# Patient Record
Sex: FEMALE | Race: BLACK OR AFRICAN AMERICAN | NOT HISPANIC OR LATINO | Employment: OTHER | URBAN - METROPOLITAN AREA
[De-identification: names, ages, dates, MRNs, and addresses within clinical notes are randomized per-mention and may not be internally consistent; named-entity substitution may affect disease eponyms.]

---

## 2023-03-12 DIAGNOSIS — I10 HYPERTENSION, UNSPECIFIED TYPE: Primary | ICD-10-CM

## 2023-03-13 RX ORDER — MONTELUKAST SODIUM 10 MG/1
TABLET ORAL
Qty: 90 TABLET | Refills: 3 | Status: SHIPPED | OUTPATIENT
Start: 2023-03-13 | End: 2023-07-31 | Stop reason: SDUPTHER

## 2023-03-13 RX ORDER — AMLODIPINE BESYLATE 10 MG/1
TABLET ORAL
Qty: 90 TABLET | Refills: 3 | Status: SHIPPED | OUTPATIENT
Start: 2023-03-13 | End: 2023-07-31 | Stop reason: SDUPTHER

## 2023-03-13 RX ORDER — PRAVASTATIN SODIUM 40 MG/1
40 TABLET ORAL DAILY
Qty: 90 TABLET | Refills: 0 | Status: SHIPPED | OUTPATIENT
Start: 2023-03-13 | End: 2023-07-31 | Stop reason: SDUPTHER

## 2023-03-13 RX ORDER — AMLODIPINE BESYLATE 10 MG/1
10 TABLET ORAL DAILY
COMMUNITY
Start: 2013-02-04 | End: 2023-03-13 | Stop reason: SDUPTHER

## 2023-03-13 RX ORDER — MONTELUKAST SODIUM 10 MG/1
10 TABLET ORAL DAILY
Qty: 90 TABLET | Refills: 0 | Status: SHIPPED | OUTPATIENT
Start: 2023-03-13 | End: 2023-03-28 | Stop reason: SDUPTHER

## 2023-03-13 RX ORDER — AMLODIPINE BESYLATE 10 MG/1
10 TABLET ORAL DAILY
Qty: 90 TABLET | Refills: 0 | Status: SHIPPED | OUTPATIENT
Start: 2023-03-13 | End: 2023-03-28 | Stop reason: SDUPTHER

## 2023-03-13 RX ORDER — PRAVASTATIN SODIUM 40 MG/1
TABLET ORAL
Qty: 90 TABLET | Refills: 3 | Status: SHIPPED | OUTPATIENT
Start: 2023-03-13 | End: 2023-03-28 | Stop reason: SDUPTHER

## 2023-03-13 RX ORDER — MONTELUKAST SODIUM 10 MG/1
1 TABLET ORAL DAILY
COMMUNITY
Start: 2013-05-13 | End: 2023-03-13 | Stop reason: SDUPTHER

## 2023-03-13 RX ORDER — PRAVASTATIN SODIUM 40 MG/1
1 TABLET ORAL DAILY
COMMUNITY
Start: 2013-02-04 | End: 2023-03-13 | Stop reason: SDUPTHER

## 2023-03-20 PROBLEM — R44.1 VISUAL HALLUCINATION: Status: ACTIVE | Noted: 2023-03-20

## 2023-03-20 PROBLEM — M48.00 SPINAL STENOSIS: Status: ACTIVE | Noted: 2023-03-20

## 2023-03-20 PROBLEM — J45.909 ASTHMA (HHS-HCC): Status: ACTIVE | Noted: 2023-03-20

## 2023-03-20 PROBLEM — M19.072 LOCALIZED OSTEOARTHROSIS OF LEFT ANKLE AND FOOT: Status: ACTIVE | Noted: 2023-03-20

## 2023-03-20 PROBLEM — E87.6 HYPOKALEMIA: Status: ACTIVE | Noted: 2023-03-20

## 2023-03-20 PROBLEM — R05.9 COUGH: Status: ACTIVE | Noted: 2023-03-20

## 2023-03-20 PROBLEM — M48.062 LUMBAR STENOSIS WITH NEUROGENIC CLAUDICATION: Status: ACTIVE | Noted: 2023-03-20

## 2023-03-20 PROBLEM — M79.671 PAIN IN BOTH FEET: Status: ACTIVE | Noted: 2023-03-20

## 2023-03-20 PROBLEM — M53.3 SACROILIAC PAIN: Status: ACTIVE | Noted: 2023-03-20

## 2023-03-20 PROBLEM — M54.16 LUMBAR RADICULAR PAIN: Status: ACTIVE | Noted: 2023-03-20

## 2023-03-20 PROBLEM — I10 ESSENTIAL HYPERTENSION: Status: ACTIVE | Noted: 2023-03-20

## 2023-03-20 PROBLEM — M79.672 PAIN IN BOTH FEET: Status: ACTIVE | Noted: 2023-03-20

## 2023-03-20 PROBLEM — M85.80 OSTEOPENIA: Status: ACTIVE | Noted: 2023-03-20

## 2023-03-20 PROBLEM — H35.30 MACULAR DEGENERATION: Status: ACTIVE | Noted: 2023-03-20

## 2023-03-20 PROBLEM — G47.9 SLEEP DISTURBANCE: Status: ACTIVE | Noted: 2023-03-20

## 2023-03-20 PROBLEM — J30.9 ALLERGIC RHINITIS: Status: ACTIVE | Noted: 2023-03-20

## 2023-03-20 PROBLEM — H90.3 ASYMMETRIC SNHL (SENSORINEURAL HEARING LOSS): Status: ACTIVE | Noted: 2023-03-20

## 2023-03-20 PROBLEM — M81.0 AGE RELATED OSTEOPOROSIS: Status: ACTIVE | Noted: 2023-03-20

## 2023-03-20 PROBLEM — G57.51 TARSAL TUNNEL SYNDROME OF RIGHT SIDE: Status: ACTIVE | Noted: 2023-03-20

## 2023-03-20 PROBLEM — G57.63 MORTON'S NEUROMA OF BOTH FEET: Status: ACTIVE | Noted: 2023-03-20

## 2023-03-20 PROBLEM — K59.00 CONSTIPATION: Status: ACTIVE | Noted: 2023-03-20

## 2023-03-20 PROBLEM — R73.03 PREDIABETES: Status: ACTIVE | Noted: 2023-03-20

## 2023-03-20 PROBLEM — M19.071 OSTEOARTHRITIS OF RIGHT ANKLE AND FOOT: Status: ACTIVE | Noted: 2023-03-20

## 2023-03-20 PROBLEM — E78.00 ELEVATED SERUM CHOLESTEROL: Status: ACTIVE | Noted: 2023-03-20

## 2023-03-20 PROBLEM — H90.5 SENSORINEURAL HEARING LOSS OF LEFT EAR: Status: ACTIVE | Noted: 2023-03-20

## 2023-03-20 PROBLEM — G62.9 NEUROPATHY: Status: ACTIVE | Noted: 2023-03-20

## 2023-03-20 PROBLEM — R53.83 FATIGUE: Status: ACTIVE | Noted: 2023-03-20

## 2023-03-20 PROBLEM — G56.03 CARPAL TUNNEL SYNDROME ON BOTH SIDES: Status: ACTIVE | Noted: 2023-03-20

## 2023-03-20 PROBLEM — R60.0 BILATERAL LEG EDEMA: Status: ACTIVE | Noted: 2023-03-20

## 2023-03-20 PROBLEM — M48.061 LUMBAR SPINAL STENOSIS: Status: ACTIVE | Noted: 2023-03-20

## 2023-03-20 PROBLEM — M06.9 RHEUMATOID ARTHRITIS (MULTI): Status: ACTIVE | Noted: 2023-03-20

## 2023-03-20 RX ORDER — MULTIVIT-MIN/FA/LYCOPEN/LUTEIN .4-300-25
1 TABLET ORAL DAILY
Status: ON HOLD | COMMUNITY
Start: 2014-04-21

## 2023-03-20 RX ORDER — POTASSIUM CHLORIDE 750 MG/1
1 TABLET, FILM COATED, EXTENDED RELEASE ORAL DAILY
COMMUNITY
Start: 2022-05-12 | End: 2023-07-31 | Stop reason: ALTCHOICE

## 2023-03-20 RX ORDER — TIZANIDINE 4 MG/1
1 TABLET ORAL 3 TIMES DAILY PRN
COMMUNITY
Start: 2019-06-04 | End: 2024-05-20 | Stop reason: SDUPTHER

## 2023-03-20 RX ORDER — TIOTROPIUM BROMIDE INHALATION SPRAY 1.56 UG/1
2 SPRAY, METERED RESPIRATORY (INHALATION) DAILY
COMMUNITY
End: 2023-07-31 | Stop reason: ALTCHOICE

## 2023-03-20 RX ORDER — HYDROCHLOROTHIAZIDE 12.5 MG/1
1 CAPSULE ORAL DAILY
COMMUNITY
Start: 2021-04-26 | End: 2023-03-28 | Stop reason: SDUPTHER

## 2023-03-20 RX ORDER — CELECOXIB 200 MG/1
1 CAPSULE ORAL DAILY
COMMUNITY
Start: 2013-04-22 | End: 2023-03-28 | Stop reason: SDUPTHER

## 2023-03-20 RX ORDER — CALCIUM CARBONATE/VITAMIN D3 600 MG-20
1 TABLET,CHEWABLE ORAL 2 TIMES DAILY
Status: ON HOLD | COMMUNITY
Start: 2014-04-21

## 2023-03-20 RX ORDER — AZELASTINE 1 MG/ML
2 SPRAY, METERED NASAL 2 TIMES DAILY
Status: ON HOLD | COMMUNITY
Start: 2022-02-28

## 2023-03-20 RX ORDER — NORTRIPTYLINE HYDROCHLORIDE 10 MG/1
2 CAPSULE ORAL NIGHTLY
COMMUNITY
Start: 2019-10-18 | End: 2023-07-31 | Stop reason: ALTCHOICE

## 2023-03-20 RX ORDER — ASPIRIN 81 MG/1
1 TABLET ORAL DAILY
Status: ON HOLD | COMMUNITY
Start: 2016-06-27

## 2023-03-28 ENCOUNTER — OFFICE VISIT (OUTPATIENT)
Dept: PRIMARY CARE | Facility: CLINIC | Age: 83
End: 2023-03-28
Payer: COMMERCIAL

## 2023-03-28 VITALS
WEIGHT: 120 LBS | SYSTOLIC BLOOD PRESSURE: 140 MMHG | BODY MASS INDEX: 28.93 KG/M2 | TEMPERATURE: 96.9 F | DIASTOLIC BLOOD PRESSURE: 70 MMHG

## 2023-03-28 DIAGNOSIS — I10 HYPERTENSION, UNSPECIFIED TYPE: ICD-10-CM

## 2023-03-28 DIAGNOSIS — E78.00 ELEVATED SERUM CHOLESTEROL: ICD-10-CM

## 2023-03-28 DIAGNOSIS — I10 ESSENTIAL HYPERTENSION: ICD-10-CM

## 2023-03-28 DIAGNOSIS — Z12.31 ENCOUNTER FOR SCREENING MAMMOGRAM FOR MALIGNANT NEOPLASM OF BREAST: ICD-10-CM

## 2023-03-28 DIAGNOSIS — J45.909 MILD ASTHMA, UNSPECIFIED WHETHER COMPLICATED, UNSPECIFIED WHETHER PERSISTENT (HHS-HCC): ICD-10-CM

## 2023-03-28 DIAGNOSIS — M85.80 OSTEOPENIA, UNSPECIFIED LOCATION: ICD-10-CM

## 2023-03-28 DIAGNOSIS — M48.00 SPINAL STENOSIS, UNSPECIFIED SPINAL REGION: Primary | ICD-10-CM

## 2023-03-28 DIAGNOSIS — M19.071 OSTEOARTHRITIS OF RIGHT ANKLE AND FOOT: ICD-10-CM

## 2023-03-28 PROCEDURE — 3078F DIAST BP <80 MM HG: CPT | Performed by: INTERNAL MEDICINE

## 2023-03-28 PROCEDURE — 3077F SYST BP >= 140 MM HG: CPT | Performed by: INTERNAL MEDICINE

## 2023-03-28 PROCEDURE — G0439 PPPS, SUBSEQ VISIT: HCPCS | Performed by: INTERNAL MEDICINE

## 2023-03-28 PROCEDURE — 99213 OFFICE O/P EST LOW 20 MIN: CPT | Performed by: INTERNAL MEDICINE

## 2023-03-28 RX ORDER — CELECOXIB 200 MG/1
200 CAPSULE ORAL DAILY
Qty: 90 CAPSULE | Refills: 1 | Status: SHIPPED | OUTPATIENT
Start: 2023-03-28 | End: 2023-09-28

## 2023-03-28 RX ORDER — AMLODIPINE BESYLATE 10 MG/1
10 TABLET ORAL DAILY
Qty: 90 TABLET | Refills: 0 | Status: SHIPPED | OUTPATIENT
Start: 2023-03-28 | End: 2023-06-26

## 2023-03-28 RX ORDER — HYDROCHLOROTHIAZIDE 12.5 MG/1
12.5 CAPSULE ORAL DAILY
Qty: 90 CAPSULE | Refills: 1 | Status: SHIPPED | OUTPATIENT
Start: 2023-03-28 | End: 2023-07-31 | Stop reason: SDUPTHER

## 2023-03-28 RX ORDER — MONTELUKAST SODIUM 10 MG/1
10 TABLET ORAL DAILY
Qty: 90 TABLET | Refills: 0 | Status: SHIPPED | OUTPATIENT
Start: 2023-03-28 | End: 2023-06-26

## 2023-03-28 RX ORDER — PRAVASTATIN SODIUM 40 MG/1
40 TABLET ORAL DAILY
Qty: 90 TABLET | Refills: 1 | Status: SHIPPED | OUTPATIENT
Start: 2023-03-28 | End: 2023-07-31 | Stop reason: SDUPTHER

## 2023-03-28 RX ORDER — DICLOFENAC SODIUM 10 MG/G
4 GEL TOPICAL 4 TIMES DAILY PRN
Qty: 1 G | Refills: 1 | Status: SHIPPED | OUTPATIENT
Start: 2023-03-28 | End: 2023-03-31 | Stop reason: ENTERED-IN-ERROR

## 2023-03-28 ASSESSMENT — PATIENT HEALTH QUESTIONNAIRE - PHQ9
SUM OF ALL RESPONSES TO PHQ9 QUESTIONS 1 AND 2: 0
1. LITTLE INTEREST OR PLEASURE IN DOING THINGS: NOT AT ALL
2. FEELING DOWN, DEPRESSED OR HOPELESS: NOT AT ALL

## 2023-03-28 NOTE — PROGRESS NOTES
The patient is being seen for the subsequent annual wellness visit and follow up.  Past Medical, Surgical and Family History: reviewed and updated in chart.   Interval History: Patient has not been hospitalized previously.   Medications and Supplements: Review of all medications by a prescribing practitioner or clinical pharmacist (such as prescriptions, OTCs, herbal therapies and supplements) documented in the medical record.    No, the patient is not using opioids.   Health Risk Assessment:. Paper HRA completed by patient and scanned into chart.   Depression/Suicide Screening:  .   Done  No falls in the past year.  No recent hospitalizations.  Advance care planning completed.  Subjective   Patient ID: Kerry Arevalo is a 83 y.o. female who presents for No chief complaint on file..  HPI  No acute complaints.  Patient doing well. Complaint with her medications.   Review of Systems      /70   Temp 36.1 °C (96.9 °F)   Wt 54.4 kg (120 lb)   BMI 28.93 kg/m²     Objective   Physical Exam  Constitutional:       General: She is not in acute distress.     Appearance: Normal appearance.   HENT:      Head: Normocephalic and atraumatic.      Mouth/Throat:      Mouth: Mucous membranes are moist.      Pharynx: Oropharynx is clear.   Eyes:      Extraocular Movements: Extraocular movements intact.   Cardiovascular:      Rate and Rhythm: Normal rate and regular rhythm.      Heart sounds: No murmur heard.  Pulmonary:      Effort: Pulmonary effort is normal.      Breath sounds: Normal breath sounds. No wheezing.   Abdominal:      General: Bowel sounds are normal. There is no distension.      Palpations: Abdomen is soft.      Tenderness: There is no abdominal tenderness.   Musculoskeletal:         General: Swelling present.   Skin:     General: Skin is warm and dry.   Neurological:      General: No focal deficit present.      Mental Status: She is alert and oriented to person, place, and time.   Psychiatric:         Mood and  Affect: Mood normal.         Assessment/Plan   Problem List Items Addressed This Visit          Nervous    Spinal stenosis - Primary    Relevant Medications    celecoxib (CeleBREX) 200 mg capsule    diclofenac sodium (Voltaren) 1 % gel gel       Respiratory    Asthma     Spiriva sample given in clinic today , Spiriva helps her cough            Circulatory    Essential hypertension     Continue current regimen  Overall well controlled          Relevant Medications    hydroCHLOROthiazide (Microzide) 12.5 mg capsule       Musculoskeletal    Osteoarthritis of right ankle and foot     Continue celecoxib and diclofenac gel         Osteopenia     Continue vitamin D and calcium            Other    Elevated serum cholesterol     Other Visit Diagnoses       Hypertension, unspecified type        Relevant Medications    amLODIPine (Norvasc) 10 mg tablet    montelukast (Singulair) 10 mg tablet    pravastatin (Pravachol) 40 mg tablet    Encounter for screening mammogram for malignant neoplasm of breast        Relevant Orders    BI mammo bilateral screening tomosynthesis              HM:  -mammogram DUE  -DEXA showed osteopenia - DUE 2025  -colonoscopy - no more screening required     Follow up in 4 months with repeat labs

## 2023-03-28 NOTE — ASSESSMENT & PLAN NOTE
>>ASSESSMENT AND PLAN FOR OSTEOARTHRITIS OF RIGHT ANKLE AND FOOT WRITTEN ON 3/28/2023  1:37 PM BY OLINDA MURRELL, DO    Continue celecoxib and diclofenac gel

## 2023-03-31 DIAGNOSIS — M48.00 SPINAL STENOSIS, UNSPECIFIED SPINAL REGION: ICD-10-CM

## 2023-03-31 RX ORDER — DICLOFENAC SODIUM 10 MG/G
GEL TOPICAL
Qty: 1 G | Refills: 1 | Status: SHIPPED | OUTPATIENT
Start: 2023-03-31 | End: 2023-04-04 | Stop reason: SDUPTHER

## 2023-04-04 DIAGNOSIS — M48.00 SPINAL STENOSIS, UNSPECIFIED SPINAL REGION: ICD-10-CM

## 2023-04-04 RX ORDER — DICLOFENAC SODIUM 10 MG/G
GEL TOPICAL
Qty: 300 G | Refills: 0 | Status: SHIPPED | OUTPATIENT
Start: 2023-04-04 | End: 2023-04-12 | Stop reason: SDUPTHER

## 2023-04-12 DIAGNOSIS — M48.00 SPINAL STENOSIS, UNSPECIFIED SPINAL REGION: ICD-10-CM

## 2023-04-17 RX ORDER — DICLOFENAC SODIUM 10 MG/G
GEL TOPICAL
Qty: 300 G | Refills: 0 | Status: SHIPPED | OUTPATIENT
Start: 2023-04-17 | End: 2023-09-26

## 2023-06-25 DIAGNOSIS — I10 HYPERTENSION, UNSPECIFIED TYPE: ICD-10-CM

## 2023-06-26 RX ORDER — AMLODIPINE BESYLATE 10 MG/1
TABLET ORAL
Qty: 90 TABLET | Refills: 3 | Status: SHIPPED | OUTPATIENT
Start: 2023-06-26 | End: 2023-07-31 | Stop reason: SDUPTHER

## 2023-06-26 RX ORDER — MONTELUKAST SODIUM 10 MG/1
TABLET ORAL
Qty: 90 TABLET | Refills: 3 | Status: SHIPPED | OUTPATIENT
Start: 2023-06-26 | End: 2023-07-31 | Stop reason: SDUPTHER

## 2023-07-31 ENCOUNTER — OFFICE VISIT (OUTPATIENT)
Dept: PRIMARY CARE | Facility: CLINIC | Age: 83
End: 2023-07-31
Payer: MEDICARE

## 2023-07-31 ENCOUNTER — LAB (OUTPATIENT)
Dept: LAB | Facility: LAB | Age: 83
End: 2023-07-31
Payer: MEDICARE

## 2023-07-31 VITALS
DIASTOLIC BLOOD PRESSURE: 75 MMHG | BODY MASS INDEX: 29.66 KG/M2 | SYSTOLIC BLOOD PRESSURE: 124 MMHG | WEIGHT: 123 LBS | HEART RATE: 74 BPM

## 2023-07-31 DIAGNOSIS — E55.9 HYPOVITAMINOSIS D: ICD-10-CM

## 2023-07-31 DIAGNOSIS — R73.03 PREDIABETES: ICD-10-CM

## 2023-07-31 DIAGNOSIS — I10 ESSENTIAL HYPERTENSION: ICD-10-CM

## 2023-07-31 DIAGNOSIS — I10 HYPERTENSION, UNSPECIFIED TYPE: ICD-10-CM
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
## 2023-07-31 DIAGNOSIS — E87.6 HYPOKALEMIA: ICD-10-CM

## 2023-07-31 DIAGNOSIS — M81.0 AGE RELATED OSTEOPOROSIS, UNSPECIFIED PATHOLOGICAL FRACTURE PRESENCE: ICD-10-CM

## 2023-07-31 DIAGNOSIS — H90.3 ASYMMETRIC SNHL (SENSORINEURAL HEARING LOSS): Primary | ICD-10-CM

## 2023-07-31 LAB
ALANINE AMINOTRANSFERASE (SGPT) (U/L) IN SER/PLAS: 10 U/L (ref 7–45)
ALBUMIN (G/DL) IN SER/PLAS: 4.7 G/DL (ref 3.4–5)
ALBUMIN (MG/L) IN URINE: 27.6 MG/L
ALBUMIN/CREATININE (UG/MG) IN URINE: 20 UG/MG CRT (ref 0–30)
ALKALINE PHOSPHATASE (U/L) IN SER/PLAS: 67 U/L (ref 33–136)
ANION GAP IN SER/PLAS: 17 MMOL/L (ref 10–20)
ASPARTATE AMINOTRANSFERASE (SGOT) (U/L) IN SER/PLAS: 21 U/L (ref 9–39)
BILIRUBIN TOTAL (MG/DL) IN SER/PLAS: 0.6 MG/DL (ref 0–1.2)
CALCIDIOL (25 OH VITAMIN D3) (NG/ML) IN SER/PLAS: 67 NG/ML
CALCIUM (MG/DL) IN SER/PLAS: 10.6 MG/DL (ref 8.6–10.6)
CARBON DIOXIDE, TOTAL (MMOL/L) IN SER/PLAS: 26 MMOL/L (ref 21–32)
CHLORIDE (MMOL/L) IN SER/PLAS: 105 MMOL/L (ref 98–107)
CHOLESTEROL (MG/DL) IN SER/PLAS: 191 MG/DL (ref 0–199)
CHOLESTEROL IN HDL (MG/DL) IN SER/PLAS: 52.3 MG/DL
CHOLESTEROL/HDL RATIO: 3.7
CREATININE (MG/DL) IN SER/PLAS: 0.9 MG/DL (ref 0.5–1.05)
CREATININE (MG/DL) IN URINE: 138 MG/DL (ref 20–320)
ESTIMATED AVERAGE GLUCOSE FOR HBA1C: 117 MG/DL
GFR FEMALE: 64 ML/MIN/1.73M2
GLUCOSE (MG/DL) IN SER/PLAS: 92 MG/DL (ref 74–99)
HEMOGLOBIN A1C/HEMOGLOBIN TOTAL IN BLOOD: 5.7 %
LDL: 110 MG/DL (ref 0–99)
POTASSIUM (MMOL/L) IN SER/PLAS: 3.9 MMOL/L (ref 3.5–5.3)
PROTEIN TOTAL: 7.7 G/DL (ref 6.4–8.2)
SODIUM (MMOL/L) IN SER/PLAS: 144 MMOL/L (ref 136–145)
THYROTROPIN (MIU/L) IN SER/PLAS BY DETECTION LIMIT <= 0.05 MIU/L: 1.41 MIU/L (ref 0.44–3.98)
TRIGLYCERIDE (MG/DL) IN SER/PLAS: 142 MG/DL (ref 0–149)
UREA NITROGEN (MG/DL) IN SER/PLAS: 24 MG/DL (ref 6–23)
VLDL: 28 MG/DL (ref 0–40)

## 2023-07-31 PROCEDURE — 80061 LIPID PANEL: CPT

## 2023-07-31 PROCEDURE — 82570 ASSAY OF URINE CREATININE: CPT

## 2023-07-31 PROCEDURE — 84443 ASSAY THYROID STIM HORMONE: CPT

## 2023-07-31 PROCEDURE — 36415 COLL VENOUS BLD VENIPUNCTURE: CPT

## 2023-07-31 PROCEDURE — 80053 COMPREHEN METABOLIC PANEL: CPT

## 2023-07-31 PROCEDURE — 99214 OFFICE O/P EST MOD 30 MIN: CPT | Performed by: INTERNAL MEDICINE

## 2023-07-31 PROCEDURE — 3074F SYST BP LT 130 MM HG: CPT | Performed by: INTERNAL MEDICINE

## 2023-07-31 PROCEDURE — 82306 VITAMIN D 25 HYDROXY: CPT

## 2023-07-31 PROCEDURE — 1125F AMNT PAIN NOTED PAIN PRSNT: CPT | Performed by: INTERNAL MEDICINE

## 2023-07-31 PROCEDURE — 1159F MED LIST DOCD IN RCRD: CPT | Performed by: INTERNAL MEDICINE

## 2023-07-31 PROCEDURE — 83036 HEMOGLOBIN GLYCOSYLATED A1C: CPT

## 2023-07-31 PROCEDURE — 82043 UR ALBUMIN QUANTITATIVE: CPT

## 2023-07-31 PROCEDURE — 3078F DIAST BP <80 MM HG: CPT | Performed by: INTERNAL MEDICINE

## 2023-07-31 RX ORDER — PRAVASTATIN SODIUM 40 MG/1
40 TABLET ORAL DAILY
Qty: 90 TABLET | Refills: 0 | Status: SHIPPED | OUTPATIENT
Start: 2023-07-31 | End: 2023-12-13

## 2023-07-31 RX ORDER — AMLODIPINE BESYLATE 10 MG/1
10 TABLET ORAL DAILY
Qty: 90 TABLET | Refills: 3 | Status: SHIPPED | OUTPATIENT
Start: 2023-07-31 | End: 2023-10-30 | Stop reason: SINTOL

## 2023-07-31 RX ORDER — HYDROCHLOROTHIAZIDE 12.5 MG/1
12.5 CAPSULE ORAL DAILY
Qty: 90 CAPSULE | Refills: 1 | Status: SHIPPED | OUTPATIENT
Start: 2023-07-31 | End: 2023-10-30

## 2023-07-31 RX ORDER — MONTELUKAST SODIUM 10 MG/1
10 TABLET ORAL DAILY
Qty: 90 TABLET | Refills: 3 | Status: SHIPPED | OUTPATIENT
Start: 2023-07-31 | End: 2024-02-20 | Stop reason: SDUPTHER

## 2023-07-31 ASSESSMENT — ENCOUNTER SYMPTOMS
RESPIRATORY NEGATIVE: 1
LOSS OF SENSATION IN FEET: 0
CARDIOVASCULAR NEGATIVE: 1
GASTROINTESTINAL NEGATIVE: 1
DEPRESSION: 0
CONSTITUTIONAL NEGATIVE: 1
OCCASIONAL FEELINGS OF UNSTEADINESS: 0

## 2023-07-31 ASSESSMENT — PATIENT HEALTH QUESTIONNAIRE - PHQ9
1. LITTLE INTEREST OR PLEASURE IN DOING THINGS: NOT AT ALL
2. FEELING DOWN, DEPRESSED OR HOPELESS: NOT AT ALL
SUM OF ALL RESPONSES TO PHQ9 QUESTIONS 1 AND 2: 0

## 2023-07-31 NOTE — PROGRESS NOTES
Subjective   Patient ID: Kerry Arevalo is a 83 y.o. female who presents for Follow-up.  HPI    Review of Systems   Constitutional: Negative.    Respiratory: Negative.     Cardiovascular: Negative.    Gastrointestinal: Negative.        Objective   Physical Exam  Constitutional:       Appearance: She is well-developed.   Cardiovascular:      Rate and Rhythm: Normal rate and regular rhythm.      Heart sounds: Normal heart sounds. No murmur heard.  Pulmonary:      Effort: Pulmonary effort is normal.      Breath sounds: Normal breath sounds.   Abdominal:      General: Bowel sounds are normal.      Palpations: Abdomen is soft.         Assessment/Plan   Problem List Items Addressed This Visit       Age related osteoporosis    Asymmetric SNHL (sensorineural hearing loss) - Primary    Essential hypertension    Relevant Medications    hydroCHLOROthiazide (Microzide) 12.5 mg capsule    Other Relevant Orders    CBC    Comprehensive Metabolic Panel    TSH with reflex to Free T4 if abnormal    Hemoglobin A1C    Lipid Panel    Vitamin D, Total    Albumin, urine, random    Hypokalemia    Relevant Orders    CBC    Comprehensive Metabolic Panel    TSH with reflex to Free T4 if abnormal    Hemoglobin A1C    Lipid Panel    Vitamin D, Total    Albumin, urine, random    Prediabetes    Relevant Orders    CBC    Comprehensive Metabolic Panel    TSH with reflex to Free T4 if abnormal    Hemoglobin A1C    Lipid Panel    Vitamin D, Total    Albumin, urine, random    Fall     Other Visit Diagnoses       Hypertension, unspecified type        Relevant Medications    amLODIPine (Norvasc) 10 mg tablet    montelukast (Singulair) 10 mg tablet    pravastatin (Pravachol) 40 mg tablet    Other Relevant Orders    CBC    Comprehensive Metabolic Panel    TSH with reflex to Free T4 if abnormal    Hemoglobin A1C    Lipid Panel    Vitamin D, Total    Albumin, urine, random    Hypovitaminosis D        Relevant Orders    Vitamin D, Total        S/p fall last  week  Mechanical fall  No injuries    Bilateral leg edema  Likely due to Amlodipine  Recommend cutting dose in half with close BP monitoring at home  If BP goes up, go back to the full dose of Amlodipine    Due for complete fasting BW         Ivy Brown MD

## 2023-09-26 DIAGNOSIS — M48.00 SPINAL STENOSIS, UNSPECIFIED SPINAL REGION: ICD-10-CM

## 2023-09-26 RX ORDER — DICLOFENAC SODIUM 10 MG/G
GEL TOPICAL
Qty: 200 G | Refills: 0 | Status: SHIPPED | OUTPATIENT
Start: 2023-09-26 | End: 2024-02-20 | Stop reason: SDUPTHER

## 2023-09-27 DIAGNOSIS — M48.00 SPINAL STENOSIS, UNSPECIFIED SPINAL REGION: ICD-10-CM

## 2023-09-28 RX ORDER — CELECOXIB 200 MG/1
200 CAPSULE ORAL DAILY
Qty: 90 CAPSULE | Refills: 0 | Status: SHIPPED | OUTPATIENT
Start: 2023-09-28 | End: 2023-10-30 | Stop reason: SDUPTHER

## 2023-10-11 ENCOUNTER — TELEPHONE (OUTPATIENT)
Dept: RHEUMATOLOGY | Facility: CLINIC | Age: 83
End: 2023-10-11

## 2023-10-30 ENCOUNTER — LAB (OUTPATIENT)
Dept: LAB | Facility: LAB | Age: 83
End: 2023-10-30
Payer: MEDICARE

## 2023-10-30 ENCOUNTER — OFFICE VISIT (OUTPATIENT)
Dept: PRIMARY CARE | Facility: CLINIC | Age: 83
End: 2023-10-30
Payer: MEDICARE

## 2023-10-30 VITALS
DIASTOLIC BLOOD PRESSURE: 70 MMHG | BODY MASS INDEX: 29.66 KG/M2 | WEIGHT: 123 LBS | SYSTOLIC BLOOD PRESSURE: 165 MMHG | HEART RATE: 89 BPM

## 2023-10-30 DIAGNOSIS — M06.9 RHEUMATOID ARTHRITIS, INVOLVING UNSPECIFIED SITE, UNSPECIFIED WHETHER RHEUMATOID FACTOR PRESENT (MULTI): ICD-10-CM

## 2023-10-30 DIAGNOSIS — M81.0 AGE RELATED OSTEOPOROSIS, UNSPECIFIED PATHOLOGICAL FRACTURE PRESENCE: ICD-10-CM

## 2023-10-30 DIAGNOSIS — I10 ESSENTIAL HYPERTENSION: Primary | ICD-10-CM

## 2023-10-30 DIAGNOSIS — R73.03 PREDIABETES: ICD-10-CM

## 2023-10-30 DIAGNOSIS — I10 ESSENTIAL HYPERTENSION: ICD-10-CM

## 2023-10-30 DIAGNOSIS — M79.672 PAIN IN BOTH FEET: ICD-10-CM

## 2023-10-30 DIAGNOSIS — M19.071 OSTEOARTHRITIS OF RIGHT ANKLE AND FOOT: ICD-10-CM

## 2023-10-30 DIAGNOSIS — M79.671 PAIN IN BOTH FEET: ICD-10-CM

## 2023-10-30 DIAGNOSIS — M48.00 SPINAL STENOSIS, UNSPECIFIED SPINAL REGION: ICD-10-CM

## 2023-10-30 DIAGNOSIS — E87.6 HYPOKALEMIA: Primary | ICD-10-CM

## 2023-10-30 LAB
25(OH)D3 SERPL-MCNC: 66 NG/ML (ref 30–100)
ALBUMIN SERPL BCP-MCNC: 5.1 G/DL (ref 3.4–5)
ALP SERPL-CCNC: 75 U/L (ref 33–136)
ALT SERPL W P-5'-P-CCNC: 10 U/L (ref 7–45)
ANION GAP SERPL CALC-SCNC: 21 MMOL/L (ref 10–20)
AST SERPL W P-5'-P-CCNC: 20 U/L (ref 9–39)
BILIRUB SERPL-MCNC: 0.5 MG/DL (ref 0–1.2)
BUN SERPL-MCNC: 18 MG/DL (ref 6–23)
CALCIUM SERPL-MCNC: 11.3 MG/DL (ref 8.6–10.6)
CHLORIDE SERPL-SCNC: 100 MMOL/L (ref 98–107)
CHOLEST SERPL-MCNC: 198 MG/DL (ref 0–199)
CHOLESTEROL/HDL RATIO: 3.6
CO2 SERPL-SCNC: 25 MMOL/L (ref 21–32)
CREAT SERPL-MCNC: 0.73 MG/DL (ref 0.5–1.05)
CREAT UR-MCNC: 114.4 MG/DL (ref 20–320)
ERYTHROCYTE [DISTWIDTH] IN BLOOD BY AUTOMATED COUNT: 12.9 % (ref 11.5–14.5)
EST. AVERAGE GLUCOSE BLD GHB EST-MCNC: 120 MG/DL
GFR SERPL CREATININE-BSD FRML MDRD: 82 ML/MIN/1.73M*2
GLUCOSE SERPL-MCNC: 106 MG/DL (ref 74–99)
HBA1C MFR BLD: 5.8 %
HCT VFR BLD AUTO: 39.8 % (ref 36–46)
HDLC SERPL-MCNC: 55.3 MG/DL
HGB BLD-MCNC: 13 G/DL (ref 12–16)
LDLC SERPL CALC-MCNC: 105 MG/DL
MCH RBC QN AUTO: 28.7 PG (ref 26–34)
MCHC RBC AUTO-ENTMCNC: 32.7 G/DL (ref 32–36)
MCV RBC AUTO: 88 FL (ref 80–100)
MICROALBUMIN UR-MCNC: 46.2 MG/L
MICROALBUMIN/CREAT UR: 40.4 UG/MG CREAT
MUCOUS THREADS #/AREA URNS AUTO: NORMAL /LPF
NON HDL CHOLESTEROL: 143 MG/DL (ref 0–149)
NRBC BLD-RTO: 0 /100 WBCS (ref 0–0)
PLATELET # BLD AUTO: 301 X10*3/UL (ref 150–450)
PMV BLD AUTO: 10 FL (ref 7.5–11.5)
POTASSIUM SERPL-SCNC: 3 MMOL/L (ref 3.5–5.3)
PROT SERPL-MCNC: 8.6 G/DL (ref 6.4–8.2)
RBC # BLD AUTO: 4.53 X10*6/UL (ref 4–5.2)
RBC #/AREA URNS AUTO: NORMAL /HPF
SODIUM SERPL-SCNC: 143 MMOL/L (ref 136–145)
TRIGL SERPL-MCNC: 191 MG/DL (ref 0–149)
TSH SERPL-ACNC: 1.1 MIU/L (ref 0.44–3.98)
VLDL: 38 MG/DL (ref 0–40)
WBC # BLD AUTO: 8.6 X10*3/UL (ref 4.4–11.3)
WBC #/AREA URNS AUTO: NORMAL /HPF

## 2023-10-30 PROCEDURE — 1036F TOBACCO NON-USER: CPT | Performed by: INTERNAL MEDICINE

## 2023-10-30 PROCEDURE — 85027 COMPLETE CBC AUTOMATED: CPT

## 2023-10-30 PROCEDURE — 82306 VITAMIN D 25 HYDROXY: CPT

## 2023-10-30 PROCEDURE — 1125F AMNT PAIN NOTED PAIN PRSNT: CPT | Performed by: INTERNAL MEDICINE

## 2023-10-30 PROCEDURE — 80053 COMPREHEN METABOLIC PANEL: CPT

## 2023-10-30 PROCEDURE — 99214 OFFICE O/P EST MOD 30 MIN: CPT | Performed by: INTERNAL MEDICINE

## 2023-10-30 PROCEDURE — 1159F MED LIST DOCD IN RCRD: CPT | Performed by: INTERNAL MEDICINE

## 2023-10-30 PROCEDURE — 84443 ASSAY THYROID STIM HORMONE: CPT

## 2023-10-30 PROCEDURE — 80061 LIPID PANEL: CPT

## 2023-10-30 PROCEDURE — 83036 HEMOGLOBIN GLYCOSYLATED A1C: CPT

## 2023-10-30 PROCEDURE — 82570 ASSAY OF URINE CREATININE: CPT

## 2023-10-30 PROCEDURE — 36415 COLL VENOUS BLD VENIPUNCTURE: CPT

## 2023-10-30 PROCEDURE — 82043 UR ALBUMIN QUANTITATIVE: CPT

## 2023-10-30 PROCEDURE — 3077F SYST BP >= 140 MM HG: CPT | Performed by: INTERNAL MEDICINE

## 2023-10-30 PROCEDURE — 81001 URINALYSIS AUTO W/SCOPE: CPT

## 2023-10-30 PROCEDURE — 3078F DIAST BP <80 MM HG: CPT | Performed by: INTERNAL MEDICINE

## 2023-10-30 RX ORDER — LOSARTAN POTASSIUM AND HYDROCHLOROTHIAZIDE 12.5; 5 MG/1; MG/1
1 TABLET ORAL DAILY
Qty: 30 TABLET | Refills: 11 | Status: SHIPPED | OUTPATIENT
Start: 2023-10-30 | End: 2023-10-31 | Stop reason: SDUPTHER

## 2023-10-30 RX ORDER — CELECOXIB 200 MG/1
200 CAPSULE ORAL DAILY
Qty: 90 CAPSULE | Refills: 0 | Status: SHIPPED | OUTPATIENT
Start: 2023-10-30 | End: 2024-02-14

## 2023-10-30 ASSESSMENT — ENCOUNTER SYMPTOMS
CHILLS: 0
VOMITING: 0
MYALGIAS: 0
SHORTNESS OF BREATH: 0
WHEEZING: 0
LIGHT-HEADEDNESS: 0
DIARRHEA: 0
FLANK PAIN: 0
PALPITATIONS: 0
DIFFICULTY URINATING: 0
DYSURIA: 0
ABDOMINAL PAIN: 0
JOINT SWELLING: 1
NUMBNESS: 0
DIZZINESS: 0
COLOR CHANGE: 0
CONFUSION: 0
COUGH: 0
ARTHRALGIAS: 1
FREQUENCY: 0
HEMATURIA: 0
FEVER: 0
ABDOMINAL DISTENTION: 0
NAUSEA: 0

## 2023-10-30 NOTE — ASSESSMENT & PLAN NOTE
- elevated BP at home and today in the office  - /70  - stopped Amlodipine and hydrochlorothiazide  - started on Losartan/hydrochlorothiazide 50-12.5 daily  - follow up in 6 weeks

## 2023-10-30 NOTE — PROGRESS NOTES
Subjective      History Of Present Illness:  Kerry Arevalo is a 83 y.o. female with a PMH of HTN, HLD, pre-DM2, spinal stenosis, OA, RA, who presents to [facility] for presented for a follow up. Patient reports worsening swelling with pain in her bilateral ankles for the past couple of months. She was recently seen by her rheumatologist who stopped Celebrex. She was already taking half of Amlodipine 10mg and continues to experience worsening swelling with pain in her ankles. She also continues to elevate her feet at night and wear compression stockings daily which used to help with swelling but recently stopped helping.    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), Other specified disorders of left ear (12/27/2016), Personal history of diseases of the blood and blood-forming organs and certain disorders involving the immune mechanism (02/19/2013), Personal history of diseases of the skin and subcutaneous tissue (05/12/2013), Personal history of other diseases of the musculoskeletal system and connective tissue, Personal history of other diseases of the nervous system and sense organs, Personal history of other diseases of the respiratory system (03/04/2019), Personal history of other infectious and parasitic diseases (03/09/2017), Personal history of other specified conditions (04/11/2019), and Unspecified asthma, uncomplicated  (12/19/2022).    Past Surgical History:  She has a past surgical history that includes Colonoscopy (02/19/2013); Total abdominal hysterectomy w/ bilateral salpingoophorectomy (02/19/2013); Other surgical history (05/12/2013); Hernia repair (09/26/2016); and Cataract extraction (10/14/2014).     Social History:  She reports that she has never smoked. She has never used smokeless tobacco. She reports that she does not currently use alcohol. She reports that she does not use drugs.    Family History:  Family History   Problem Relation Name Age of Onset    Heart failure Mother      Osteoporosis Mother      Other (cerebral hemorrhage) Father      Alzheimer's disease Sister      Heart attack Brother      Breast cancer Other       Allergies:  Duloxetine and Topiramate    Home Medications:  (Not in a hospital admission)    Review Of Systems:  11-point ROS was performed and is negative except as noted below and in the HPI.     Review of Systems   Constitutional:  Negative for chills and fever.   Eyes:  Negative for visual disturbance.   Respiratory:  Negative for cough, shortness of breath and wheezing.    Cardiovascular:  Negative for chest pain, palpitations and leg swelling.   Gastrointestinal:  Negative for abdominal distention, abdominal pain, diarrhea, nausea and vomiting.   Genitourinary:  Negative for difficulty urinating, dysuria, flank pain, frequency and hematuria.   Musculoskeletal:  Positive for arthralgias and joint swelling. Negative for myalgias.   Skin:  Negative for color change.   Neurological:  Negative for dizziness, light-headedness and numbness.   Psychiatric/Behavioral:  Negative for confusion.         Objective      /70   Pulse 89   Wt 55.8 kg (123 lb)   BMI 29.66 kg/m²     Physical Exam  Constitutional:       General: She is not in acute distress.     Appearance: Normal appearance.   HENT:      Head: Normocephalic and atraumatic.      Mouth/Throat:      Mouth: Mucous membranes are moist.    Eyes:      Conjunctiva/sclera: Conjunctivae normal.      Pupils: Pupils are equal, round, and reactive to light.   Cardiovascular:      Rate and Rhythm: Normal rate and regular rhythm.      Heart sounds: Normal heart sounds.   Pulmonary:      Effort: No respiratory distress.      Breath sounds: Normal breath sounds. No wheezing or rhonchi.   Abdominal:      General: Bowel sounds are normal.      Palpations: Abdomen is soft.      Tenderness: There is no abdominal tenderness.   Musculoskeletal:         General: Swelling (bilat ankles) and tenderness (bilat ankles) present.      Cervical back: Neck supple.   Skin:     General: Skin is warm and dry.   Neurological:      General: No focal deficit present.      Mental Status: She is alert.       Assessment & Plan:     Assessment/Plan     Problem List Items Addressed This Visit       Age related osteoporosis     - controlled with Celebrex PRN         Relevant Orders    TSH with reflex to Free T4 if abnormal    Microscopic Only, Urine    Albumin , Urine Random    Vitamin D 25-Hydroxy,Total (for eval of Vitamin D levels)    CBC    Essential hypertension - Primary     - elevated BP at home and today in the office  - /70  - stopped Amlodipine and hydrochlorothiazide  - started on Losartan/hydrochlorothiazide 50-12.5 daily  - follow up in 6 weeks         Relevant Medications    losartan-hydrochlorothiazide (Hyzaar) 50-12.5 mg tablet    Other Relevant Orders    Comprehensive Metabolic Panel    Hemoglobin A1C    Lipid Panel    TSH with reflex to Free T4 if abnormal    Microscopic Only, Urine    Albumin , Urine Random    Vitamin D 25-Hydroxy,Total (for eval of Vitamin D levels)    CBC    Osteoarthritis of right ankle and foot    Pain in both feet     - controlled with Celebrex PRN         Prediabetes     - controlled with diet  - A1c 5.7%   - repeat ordered         Relevant Orders    Hemoglobin A1C    Lipid Panel    Rheumatoid arthritis (CMS/[facility])     - pt follows up with meliton  rheumatologist         Relevant Orders    TSH with reflex to Free T4 if abnormal    Spinal stenosis     - continue taking Celebrex PRN         Relevant Medications    celecoxib (CeleBREX) 200 mg capsule     #Health Maintenance:  - Routine labs ordered    Dispo: Patient is scheduled to return to clinic in 6 weeks.    Pantera Hansen DO, PGY-2  Internal Medicine     Disclaimer: Documentation completed with the information available at the time of input. The times in the chart may not be reflective of actual patient care times, interventions, or procedures. Documentation occurs after the physical care of the patient.

## 2023-10-31 RX ORDER — POTASSIUM CHLORIDE 20 MEQ/1
20 TABLET, EXTENDED RELEASE ORAL DAILY
Qty: 7 TABLET | Refills: 0 | Status: SHIPPED | OUTPATIENT
Start: 2023-10-31 | End: 2023-11-07

## 2023-10-31 RX ORDER — LOSARTAN POTASSIUM AND HYDROCHLOROTHIAZIDE 12.5; 5 MG/1; MG/1
1 TABLET ORAL DAILY
Qty: 90 TABLET | Refills: 1 | Status: SHIPPED | OUTPATIENT
Start: 2023-10-31 | End: 2023-12-22 | Stop reason: ALTCHOICE

## 2023-12-12 DIAGNOSIS — I10 HYPERTENSION, UNSPECIFIED TYPE: ICD-10-CM

## 2023-12-13 RX ORDER — PRAVASTATIN SODIUM 40 MG/1
40 TABLET ORAL DAILY
Qty: 90 TABLET | Refills: 3 | Status: SHIPPED | OUTPATIENT
Start: 2023-12-13 | End: 2024-02-20 | Stop reason: SDUPTHER

## 2023-12-22 ENCOUNTER — LAB (OUTPATIENT)
Dept: LAB | Facility: LAB | Age: 83
End: 2023-12-22
Payer: MEDICARE

## 2023-12-22 ENCOUNTER — OFFICE VISIT (OUTPATIENT)
Dept: PRIMARY CARE | Facility: CLINIC | Age: 83
End: 2023-12-22
Payer: MEDICARE

## 2023-12-22 VITALS — DIASTOLIC BLOOD PRESSURE: 80 MMHG | WEIGHT: 124 LBS | BODY MASS INDEX: 29.9 KG/M2 | SYSTOLIC BLOOD PRESSURE: 160 MMHG

## 2023-12-22 DIAGNOSIS — E87.6 HYPOKALEMIA: ICD-10-CM

## 2023-12-22 DIAGNOSIS — I10 ESSENTIAL HYPERTENSION: ICD-10-CM

## 2023-12-22 DIAGNOSIS — R73.03 PREDIABETES: ICD-10-CM

## 2023-12-22 DIAGNOSIS — I10 HYPERTENSION, UNSPECIFIED TYPE: ICD-10-CM

## 2023-12-22 DIAGNOSIS — I10 HYPERTENSION, UNSPECIFIED TYPE: Primary | ICD-10-CM

## 2023-12-22 LAB
ALBUMIN SERPL BCP-MCNC: 4.6 G/DL (ref 3.4–5)
ANION GAP SERPL CALC-SCNC: 17 MMOL/L (ref 10–20)
BUN SERPL-MCNC: 20 MG/DL (ref 6–23)
CALCIUM SERPL-MCNC: 10.9 MG/DL (ref 8.6–10.6)
CHLORIDE SERPL-SCNC: 107 MMOL/L (ref 98–107)
CO2 SERPL-SCNC: 24 MMOL/L (ref 21–32)
CREAT SERPL-MCNC: 0.83 MG/DL (ref 0.5–1.05)
ERYTHROCYTE [DISTWIDTH] IN BLOOD BY AUTOMATED COUNT: 13.3 % (ref 11.5–14.5)
GFR SERPL CREATININE-BSD FRML MDRD: 70 ML/MIN/1.73M*2
GLUCOSE SERPL-MCNC: 94 MG/DL (ref 74–99)
HCT VFR BLD AUTO: 35.7 % (ref 36–46)
HGB BLD-MCNC: 12.1 G/DL (ref 12–16)
MCH RBC QN AUTO: 29.7 PG (ref 26–34)
MCHC RBC AUTO-ENTMCNC: 33.9 G/DL (ref 32–36)
MCV RBC AUTO: 88 FL (ref 80–100)
NRBC BLD-RTO: 0 /100 WBCS (ref 0–0)
PHOSPHATE SERPL-MCNC: 4.1 MG/DL (ref 2.5–4.9)
PLATELET # BLD AUTO: 254 X10*3/UL (ref 150–450)
POTASSIUM SERPL-SCNC: 3.7 MMOL/L (ref 3.5–5.3)
RBC # BLD AUTO: 4.08 X10*6/UL (ref 4–5.2)
SODIUM SERPL-SCNC: 144 MMOL/L (ref 136–145)
WBC # BLD AUTO: 6.5 X10*3/UL (ref 4.4–11.3)

## 2023-12-22 PROCEDURE — 1125F AMNT PAIN NOTED PAIN PRSNT: CPT | Performed by: INTERNAL MEDICINE

## 2023-12-22 PROCEDURE — 99213 OFFICE O/P EST LOW 20 MIN: CPT | Performed by: INTERNAL MEDICINE

## 2023-12-22 PROCEDURE — 36415 COLL VENOUS BLD VENIPUNCTURE: CPT

## 2023-12-22 PROCEDURE — 85027 COMPLETE CBC AUTOMATED: CPT

## 2023-12-22 PROCEDURE — 1159F MED LIST DOCD IN RCRD: CPT | Performed by: INTERNAL MEDICINE

## 2023-12-22 PROCEDURE — 3079F DIAST BP 80-89 MM HG: CPT | Performed by: INTERNAL MEDICINE

## 2023-12-22 PROCEDURE — 80069 RENAL FUNCTION PANEL: CPT

## 2023-12-22 PROCEDURE — 3077F SYST BP >= 140 MM HG: CPT | Performed by: INTERNAL MEDICINE

## 2023-12-22 PROCEDURE — 1036F TOBACCO NON-USER: CPT | Performed by: INTERNAL MEDICINE

## 2023-12-22 RX ORDER — LOSARTAN POTASSIUM 50 MG/1
50 TABLET ORAL DAILY
Qty: 30 TABLET | Refills: 11 | Status: SHIPPED | OUTPATIENT
Start: 2023-12-22 | End: 2024-02-20 | Stop reason: SDUPTHER

## 2023-12-22 NOTE — PROGRESS NOTES
MRN: [id number]     Subjective   Patient ID: Kerry Arevalo is a 83 y.o. female who presents for Follow-up.  HPI  Ms. Arevalo is an 82 yo female patient with PMHx of HTN, HLD, osteopenia who presented today for BP follow up visit and to check on her BP. She was changed from amlodipine to Losartan-hydrochlorothiazide and has been compliant with her medications. She has been checking her BP at home daily wrote her numbers down, she has been having BP at 120/80 most of the days 2 readings of 90/70 and 1 reading of 85/60, patient denied feeling dizzy ir having any discomfort. Otherwise doing well and had no concerns.     Review of Systems  - CONSTITUTIONAL: Denies weight loss, fever and chills.  - HEENT: Denies changes in vision and hearing.  - RESPIRATORY: Denies SOB and cough.  - CV: Denies palpitations and CP.   - GI: Denies abdominal pain, nausea, vomiting and diarrhea.  - : Denies dysuria and urinary frequency.  - MSK: Denies myalgia and  joint pain.  - SKIN: Denies rash and pruritus.  - NEUROLOGICAL: Denies headache and syncope.  - PSYCHIATRIC: Denies recent changes in mood. Denies anxiety and depression.   Objective   Physical Exam  Constitutional: patient is alert and cooperative with exam  skin: dry and warm  Eyes: EOMI and clear sclera  ENMT: moist mucous membranes  Head/Neck: normal neck, no JVD and trachea midline  Respiratory/Thorax: Clear to auscultation bilaterally, no wheezing or crackles appreciated  Cardiovascular: regular rate and rhythm, S1 and S2 present, no murmurs heard  Gastrointestinal: bowel sounds present, no pain or tenderness upon palpation, soft and nondistended  Extremities: +2 radial, posterior tibial, and pedal pulse, no lower extremity edema noted  Neurological: A&Ox3 no focal deficit     Visit Vitals  /80          Assessment/Plan     Ms. Arevalo is an 82 yo female patient with PMHx of HTN, HLD, osteopenia who presented today for BP follow up visit and to check on her BP.     #HTN  -  Better control now with low readings  - elevated in office today likely patient stressed and had to walk long distance to get to the office  - her BP monitor was checked in office and the readings are accurate and matches with manual reads  - seen some recent low readings will stop hydrochlorothiazide  - Continue Losartan 50 mg daily   - keep BP diary     Otherwise no changes to medications    #Health maintenance:  - Mammogram last done 5/8/2023 was normal > due in 5/8/2024  - Dexa Scan last done 2/20/2023 showed osteopenia   - Lab: uptodate on [facility]    Dispo: F/U in 2 months

## 2024-02-13 DIAGNOSIS — M48.00 SPINAL STENOSIS, UNSPECIFIED SPINAL REGION: ICD-10-CM

## 2024-02-14 RX ORDER — CELECOXIB 200 MG/1
CAPSULE ORAL
Qty: 90 CAPSULE | Refills: 3 | Status: SHIPPED | OUTPATIENT
Start: 2024-02-14 | End: 2024-02-20 | Stop reason: SDUPTHER

## 2024-02-20 ENCOUNTER — OFFICE VISIT (OUTPATIENT)
Dept: PRIMARY CARE | Facility: CLINIC | Age: 84
End: 2024-02-20
Payer: MEDICARE

## 2024-02-20 VITALS
SYSTOLIC BLOOD PRESSURE: 138 MMHG | BODY MASS INDEX: 30.55 KG/M2 | HEIGHT: 55 IN | DIASTOLIC BLOOD PRESSURE: 80 MMHG | WEIGHT: 132 LBS

## 2024-02-20 DIAGNOSIS — M48.00 SPINAL STENOSIS, UNSPECIFIED SPINAL REGION: ICD-10-CM

## 2024-02-20 DIAGNOSIS — I10 HYPERTENSION, UNSPECIFIED TYPE: ICD-10-CM

## 2024-02-20 DIAGNOSIS — M06.9 RHEUMATOID ARTHRITIS, INVOLVING UNSPECIFIED SITE, UNSPECIFIED WHETHER RHEUMATOID FACTOR PRESENT (MULTI): ICD-10-CM

## 2024-02-20 DIAGNOSIS — Z00.00 ROUTINE GENERAL MEDICAL EXAMINATION AT HEALTH CARE FACILITY: Primary | ICD-10-CM

## 2024-02-20 PROCEDURE — 3075F SYST BP GE 130 - 139MM HG: CPT | Performed by: INTERNAL MEDICINE

## 2024-02-20 PROCEDURE — 1158F ADVNC CARE PLAN TLK DOCD: CPT | Performed by: INTERNAL MEDICINE

## 2024-02-20 PROCEDURE — 1160F RVW MEDS BY RX/DR IN RCRD: CPT | Performed by: INTERNAL MEDICINE

## 2024-02-20 PROCEDURE — 1170F FXNL STATUS ASSESSED: CPT | Performed by: INTERNAL MEDICINE

## 2024-02-20 PROCEDURE — G0439 PPPS, SUBSEQ VISIT: HCPCS | Performed by: INTERNAL MEDICINE

## 2024-02-20 PROCEDURE — 1159F MED LIST DOCD IN RCRD: CPT | Performed by: INTERNAL MEDICINE

## 2024-02-20 PROCEDURE — 3079F DIAST BP 80-89 MM HG: CPT | Performed by: INTERNAL MEDICINE

## 2024-02-20 PROCEDURE — 1123F ACP DISCUSS/DSCN MKR DOCD: CPT | Performed by: INTERNAL MEDICINE

## 2024-02-20 PROCEDURE — 1036F TOBACCO NON-USER: CPT | Performed by: INTERNAL MEDICINE

## 2024-02-20 PROCEDURE — 99214 OFFICE O/P EST MOD 30 MIN: CPT | Performed by: INTERNAL MEDICINE

## 2024-02-20 PROCEDURE — 1125F AMNT PAIN NOTED PAIN PRSNT: CPT | Performed by: INTERNAL MEDICINE

## 2024-02-20 RX ORDER — LOSARTAN POTASSIUM 50 MG/1
50 TABLET ORAL DAILY
Qty: 90 TABLET | Refills: 1 | Status: ON HOLD | OUTPATIENT
Start: 2024-02-20 | End: 2025-02-19

## 2024-02-20 RX ORDER — MONTELUKAST SODIUM 10 MG/1
10 TABLET ORAL DAILY
Qty: 90 TABLET | Refills: 1 | Status: ON HOLD | OUTPATIENT
Start: 2024-02-20

## 2024-02-20 RX ORDER — DICLOFENAC SODIUM 10 MG/G
GEL TOPICAL
Qty: 200 G | Refills: 0 | Status: ON HOLD | OUTPATIENT
Start: 2024-02-20

## 2024-02-20 RX ORDER — PRAVASTATIN SODIUM 40 MG/1
40 TABLET ORAL DAILY
Qty: 90 TABLET | Refills: 1 | Status: ON HOLD | OUTPATIENT
Start: 2024-02-20

## 2024-02-20 RX ORDER — CELECOXIB 200 MG/1
CAPSULE ORAL
Qty: 90 CAPSULE | Refills: 1 | Status: SHIPPED | OUTPATIENT
Start: 2024-02-20 | End: 2024-04-23 | Stop reason: SDUPTHER

## 2024-02-20 ASSESSMENT — ENCOUNTER SYMPTOMS
GASTROINTESTINAL NEGATIVE: 1
RESPIRATORY NEGATIVE: 1
CONSTITUTIONAL NEGATIVE: 1
CARDIOVASCULAR NEGATIVE: 1

## 2024-02-20 ASSESSMENT — LIFESTYLE VARIABLES: HOW MANY STANDARD DRINKS CONTAINING ALCOHOL DO YOU HAVE ON A TYPICAL DAY: PATIENT DOES NOT DRINK

## 2024-02-20 ASSESSMENT — PATIENT HEALTH QUESTIONNAIRE - PHQ9
SUM OF ALL RESPONSES TO PHQ9 QUESTIONS 1 AND 2: 0
SUM OF ALL RESPONSES TO PHQ9 QUESTIONS 1 AND 2: 0
1. LITTLE INTEREST OR PLEASURE IN DOING THINGS: NOT AT ALL
2. FEELING DOWN, DEPRESSED OR HOPELESS: NOT AT ALL
1. LITTLE INTEREST OR PLEASURE IN DOING THINGS: NOT AT ALL
2. FEELING DOWN, DEPRESSED OR HOPELESS: NOT AT ALL

## 2024-02-20 ASSESSMENT — ACTIVITIES OF DAILY LIVING (ADL)
DOING_HOUSEWORK: INDEPENDENT
GROCERY_SHOPPING: INDEPENDENT
DRESSING: INDEPENDENT
TAKING_MEDICATION: INDEPENDENT
MANAGING_FINANCES: INDEPENDENT
BATHING: INDEPENDENT

## 2024-02-20 NOTE — PROGRESS NOTES
"Subjective   Reason for Visit: Kerry Arevalo is an 84 y.o. female here for a Medicare Wellness visit.     Past Medical, Surgical, and Family History reviewed and updated in chart.    Reviewed all medications by prescribing practitioner or clinical pharmacist (such as prescriptions, OTCs, herbal therapies and supplements) and documented in the medical record.    HPI    Patient Care Team:  Ivy Brown MD as PCP - General  Ivy Brown MD as PCP - [employer] PCP     Review of Systems   Constitutional: Negative.    Respiratory: Negative.     Cardiovascular: Negative.    Gastrointestinal: Negative.        Objective   Vitals:  /80 (BP Location: Left arm, Patient Position: Sitting, BP Cuff Size: Adult)   Ht 1.397 m (4' 7\")   Wt 59.9 kg (132 lb)   BMI 30.68 kg/m²       Physical Exam  Constitutional:       Appearance: She is well-developed.   Cardiovascular:      Rate and Rhythm: Normal rate and regular rhythm.      Heart sounds: Normal heart sounds. No murmur heard.  Pulmonary:      Effort: Pulmonary effort is normal.      Breath sounds: Normal breath sounds.   Abdominal:      General: Bowel sounds are normal.      Palpations: Abdomen is soft.         Assessment/Plan   Problem List Items Addressed This Visit       Rheumatoid arthritis (CMS/HCC)    Overview     Chronic Condition Documentation: Stable based on symptoms and exam. Continue established treatment plan and follow-up at least yearly.         Current Assessment & Plan     STABLE         Spinal stenosis    Relevant Medications    celecoxib (CeleBREX) 200 mg capsule    diclofenac sodium (Voltaren) 1 % gel    Hypertension    Relevant Medications    losartan (Cozaar) 50 mg tablet    montelukast (Singulair) 10 mg tablet    pravastatin (Pravachol) 40 mg tablet     Other Visit Diagnoses       Routine general medical examination at health care facility    -  Primary          HTN  Well controlled  Continue with current  Refills " sent    HLD  On statis  No changes in meds    Allergic rhinitis  On Singulair    Up to date with screening tests       Medicare Wellness Billing Compliance Satisfied    *This is a visual tool to show completion of required items on the day of the visit. Green checks will only appear on the date of visit.    Review all medications by prescribing practitioner or clinical pharmacist (such as prescriptions, OTCs, herbal therapies and supplements) documented in the medical record    Past Medical, Surgical, and Family History reviewed and updated in chart    Tobacco Use Reviewed    Alcohol Use Reviewed    Illicit Drug Use Reviewed    PHQ2/9    Falls in Last Year Reviewed    Home Safety Risk Factors Reviewed    Cognitive Impairment Reviewed    Patient Self Assessment and Health Status    Current Diet Reviewed    Exercise Frequency    ADL - Hearing Impairment    ADL - Bathing    ADL - Dressing    ADL - Walks in Home    IADL - Managing Finances    IADL - Grocery Shopping    IADL - Taking Medications    IADL - Doing Housework

## 2024-02-29 NOTE — TELEPHONE ENCOUNTER
Patient indicated that she is having problems with her Celebrex....feet are continuing to swell and the swelling is going up her legs. Should she discontinue meds? What else can she take?   Anemia

## 2024-04-23 ENCOUNTER — OFFICE VISIT (OUTPATIENT)
Dept: RHEUMATOLOGY | Facility: CLINIC | Age: 84
End: 2024-04-23
Payer: MEDICARE

## 2024-04-23 VITALS
BODY MASS INDEX: 28.46 KG/M2 | SYSTOLIC BLOOD PRESSURE: 170 MMHG | WEIGHT: 123 LBS | DIASTOLIC BLOOD PRESSURE: 76 MMHG | TEMPERATURE: 97.5 F | HEART RATE: 65 BPM | HEIGHT: 55 IN

## 2024-04-23 DIAGNOSIS — M06.89 OTHER SPECIFIED RHEUMATOID ARTHRITIS, MULTIPLE SITES (MULTI): Primary | ICD-10-CM

## 2024-04-23 DIAGNOSIS — M48.00 SPINAL STENOSIS, UNSPECIFIED SPINAL REGION: ICD-10-CM

## 2024-04-23 PROCEDURE — 3077F SYST BP >= 140 MM HG: CPT | Performed by: INTERNAL MEDICINE

## 2024-04-23 PROCEDURE — 99213 OFFICE O/P EST LOW 20 MIN: CPT | Performed by: INTERNAL MEDICINE

## 2024-04-23 PROCEDURE — 3078F DIAST BP <80 MM HG: CPT | Performed by: INTERNAL MEDICINE

## 2024-04-23 PROCEDURE — 1160F RVW MEDS BY RX/DR IN RCRD: CPT | Performed by: INTERNAL MEDICINE

## 2024-04-23 PROCEDURE — 1159F MED LIST DOCD IN RCRD: CPT | Performed by: INTERNAL MEDICINE

## 2024-04-23 RX ORDER — SULFASALAZINE 500 MG/1
1000 TABLET, DELAYED RELEASE ORAL DAILY
Qty: 30 TABLET | Refills: 6 | Status: ON HOLD | OUTPATIENT
Start: 2024-04-23 | End: 2025-04-23

## 2024-04-23 RX ORDER — CELECOXIB 200 MG/1
CAPSULE ORAL
Qty: 90 CAPSULE | Refills: 1 | Status: ON HOLD | OUTPATIENT
Start: 2024-04-23

## 2024-04-23 NOTE — PROGRESS NOTES
She complains of discomfort in the heels and in the base of her toes particularly with weightbearing and when lying in bed.  She denies any significant pain involving any of her of the joints.  The lower back pain is not severe if she does not stand for prolonged periods of time.  She ambulates using a cane because of unsteady gait.    Examination shows mild bony prominence of the PIP joint of the digits of both hands.  There is slight prominence of the left wrist.  There is bony prominence of the mid dorsal aspect of the left foot.  There is slight tenderness at the plantar aspect of the MTP joint of the digits of both feet.  There is no tenderness to palpation over the heels.  Straight leg raise test is normal bilaterally in the seated position.    Laboratory (12/22/2023) BUN 20, creatinine 0.83, glucose 94, calcium 10.9, albumin 4.6, WBC 6.5, hemoglobin 12.1, hematocrit 35.7, MCV 88, MCHC 33.9, platelets 254.    DEXA bone density T-score (2/20/2023)... (8/7/2020)  Left femoral neck....................... -1.6................... -0.3  Left total femur.............................-1.3.................. -1.0  L1-L4................................................. -0.2................. -0.4    She has osteoarthritis, rheumatoid arthritis, osteopenia, and asthma.    She is to continue Celebrex 200 mg once per day as needed for pain.  She is to start sulfasalazine 500 mg every evening with dinner.  She is to return at the next available office appointment.

## 2024-05-20 ENCOUNTER — OFFICE VISIT (OUTPATIENT)
Dept: NEUROLOGY | Facility: CLINIC | Age: 84
End: 2024-05-20
Payer: MEDICARE

## 2024-05-20 VITALS
WEIGHT: 120 LBS | BODY MASS INDEX: 27.77 KG/M2 | HEART RATE: 71 BPM | TEMPERATURE: 98 F | SYSTOLIC BLOOD PRESSURE: 169 MMHG | DIASTOLIC BLOOD PRESSURE: 89 MMHG | HEIGHT: 55 IN

## 2024-05-20 DIAGNOSIS — G56.03 CARPAL TUNNEL SYNDROME ON BOTH SIDES: ICD-10-CM

## 2024-05-20 DIAGNOSIS — M54.16 LUMBAR RADICULOPATHY: Primary | ICD-10-CM

## 2024-05-20 PROCEDURE — 3079F DIAST BP 80-89 MM HG: CPT | Performed by: PSYCHIATRY & NEUROLOGY

## 2024-05-20 PROCEDURE — 1160F RVW MEDS BY RX/DR IN RCRD: CPT | Performed by: PSYCHIATRY & NEUROLOGY

## 2024-05-20 PROCEDURE — 99213 OFFICE O/P EST LOW 20 MIN: CPT | Performed by: PSYCHIATRY & NEUROLOGY

## 2024-05-20 PROCEDURE — 1159F MED LIST DOCD IN RCRD: CPT | Performed by: PSYCHIATRY & NEUROLOGY

## 2024-05-20 PROCEDURE — 3077F SYST BP >= 140 MM HG: CPT | Performed by: PSYCHIATRY & NEUROLOGY

## 2024-05-20 RX ORDER — TIZANIDINE 4 MG/1
4 TABLET ORAL 3 TIMES DAILY PRN
Qty: 270 TABLET | Refills: 3 | Status: ON HOLD | OUTPATIENT
Start: 2024-05-20 | End: 2025-05-20

## 2024-05-20 RX ORDER — ACETAMINOPHEN 500MG/15ML
LIQUID (ML) ORAL
Status: ON HOLD | COMMUNITY

## 2024-05-20 RX ORDER — AMOXICILLIN 250 MG
CAPSULE ORAL
Status: ON HOLD | COMMUNITY
Start: 2015-06-23

## 2024-05-20 RX ORDER — IBUPROFEN 200 MG
950 CAPSULE ORAL
Status: ON HOLD | COMMUNITY

## 2024-05-20 NOTE — PROGRESS NOTES
NEUROLOGY OUTPATIENT FOLLOW-UP NOTE    Assessment/Plan   Diagnoses and all orders for this visit:  Lumbar radiculopathy  Carpal tunnel syndrome on both sides      IMPRESSION:    1. Bilateral tightness of the thighs due to lumbar radiculopathy (neurogenic claudication), stable on prn tizanidine.  2. Bilateral carpal tunnel syndrome by examination, appears stably improved.  3. Tingling of both feet only when the feet and ankles are edematous. Still no exam evidence for a peripheral neuropathy at this time.    PLAN:  To continue tizanidine as it is helpful. Continue wrist braces if the carpal tunnel syndrome flares up. To keep using a cane for balance. I will see her in another year or prn.      Terry Cardenas Jr., M.D., FAAN   ----------    Subjective     Kerry Arevalo is a 84 y.o. year old female here for follow-up.    Tizanidine still helps the thigh cramping    She has episodic tingling of the left hand.    She denies other focal neurological symptoms including dysarthria, dysphagia, diplopia, focal weakness, other focal sensory change, ataxia, vertigo, or bowel/bladder incontinence, among others.      Past Medical History:   Diagnosis Date    Body mass index (BMI) 25.0-25.9, adult 12/18/2019    Body mass index (BMI) of 25.0 to 25.9 in adult    Body mass index (BMI) 27.0-27.9, adult 04/26/2022    Body mass index (BMI) of 27.0 to 27.9 in adult    Body mass index (BMI) 28.0-28.9, adult 06/14/2022    Body mass index (BMI) of 28.0 to 28.9 in adult    Body mass index (BMI) 29.0-29.9, adult 08/16/2021    Body mass index (BMI) of 29.0 to 29.9 in adult    Body mass index (BMI)30.0-30.9, adult 01/27/2021    Body mass index (BMI) of 30.0 to 30.9 in adult    Disappearance and death of family member 10/31/2013    Bereavement, uncomplicated    Encounter for screening mammogram for malignant neoplasm of breast 06/27/2016    Encounter for screening mammogram for breast cancer    Macular cyst, hole, or pseudohole, right  eye     Macular hole, right    Menopausal and female climacteric states 11/04/2015    Menopausal symptoms    Other postherpetic nervous system involvement 07/24/2017    Postherpetic neuralgia    Other specified disorders of left ear 12/27/2016    Fullness in ear, left    Personal history of diseases of the blood and blood-forming organs and certain disorders involving the immune mechanism 02/19/2013    History of anemia    Personal history of diseases of the skin and subcutaneous tissue 05/12/2013    History of hair or hair follicle disorder    Personal history of other diseases of the musculoskeletal system and connective tissue     History of arthritis    Personal history of other diseases of the nervous system and sense organs     History of cataract    Personal history of other diseases of the respiratory system 03/04/2019    History of acute bronchitis    Personal history of other infectious and parasitic diseases 03/09/2017    History of herpes zoster    Personal history of other specified conditions 04/11/2019    History of weight loss    Unspecified asthma, uncomplicated ([facility]) 12/19/2022    Asthma     Past Surgical History:   Procedure Laterality Date    CATARACT EXTRACTION  10/14/2014    Cataract Surgery    COLONOSCOPY  02/19/2013    Complete Colonoscopy    HERNIA REPAIR  09/26/2016    Hernia Repair    OTHER SURGICAL HISTORY  05/12/2013    Repair Of Retinal Detachment With Macular Detachment    TOTAL ABDOMINAL HYSTERECTOMY W/ BILATERAL SALPINGOOPHORECTOMY  02/19/2013    Total Abdominal Hysterectomy With Removal Of Both Ovaries     Social History     Tobacco Use    Smoking status: Never    Smokeless tobacco: Never   Substance Use Topics    Alcohol use: Not Currently     family history includes Alzheimer's disease in her sister; Breast cancer in an other family member; Heart attack in her brother; Heart failure in her mother; Osteoporosis in her mother; cerebral hemorrhage in her father.    Current  "Outpatient Medications:     LYCOPENE ORAL, Take 1 tablet by mouth once daily., Disp: , Rfl:     sennosides-docusate sodium (Brittny-Colace) 8.6-50 mg tablet, Take by mouth once daily., Disp: , Rfl:     acetaminophen 500 mg/15 mL liquid, Take by mouth., Disp: , Rfl:     aspirin 81 mg EC tablet, Take 1 tablet (81 mg) by mouth once daily., Disp: , Rfl:     azelastine (Astelin) 137 mcg (0.1 %) nasal spray, Administer 2 sprays into each nostril 2 times a day., Disp: , Rfl:     calcium carbonate-vitamin D3 (Caltrate 600 plus D) 600 mg-20 mcg (800 unit) tablet,chewable, Chew 1 tablet  in the morning and 1 tablet before bedtime., Disp: , Rfl:     calcium citrate (Calcitrate) 200 mg (950 mg) tablet, Take 950 mg by mouth once daily., Disp: , Rfl:     celecoxib (CeleBREX) 200 mg capsule, TAKE 1 CAPSULE BY MOUTH ONCE  DAILY FOR PAIN AS NEEDED, Disp: 90 capsule, Rfl: 1    diclofenac sodium (Voltaren) 1 % gel, APPLY 4 GRAMS TO AFFECTED  AREA(S) TOPICALLY TWICE DAILY AS NEEDED FOR PAIN, Disp: 200 g, Rfl: 0    losartan (Cozaar) 50 mg tablet, Take 1 tablet (50 mg) by mouth once daily., Disp: 90 tablet, Rfl: 1    montelukast (Singulair) 10 mg tablet, Take 1 tablet (10 mg) by mouth once daily., Disp: 90 tablet, Rfl: 1    multivit-iron-minerals-folic acid (Centrum Silver) 0.4 mg-300 mcg- 250 mcg tab, Take 1 tablet by mouth once daily., Disp: , Rfl:     pravastatin (Pravachol) 40 mg tablet, Take 1 tablet (40 mg) by mouth once daily., Disp: 90 tablet, Rfl: 1    sulfaSALAzine (Azulfidine) 500 mg DR tablet, Take 2 tablets (1,000 mg) by mouth once daily. Do not crush, chew, or split., Disp: 30 tablet, Rfl: 6    tiZANidine (Zanaflex) 4 mg tablet, Take 1 tablet (4 mg) by mouth 3 times a day as needed for muscle spasms., Disp: , Rfl:   Allergies   Allergen Reactions    Duloxetine Other     Weight loss    Topiramate Other     Appetite and  weight loss       Objective     /89   Pulse 71   Temp 36.7 °C (98 °F)   Ht 1.397 m (4' 7\")   Wt " 54.4 kg (120 lb)   BMI 27.89 kg/m²     CONSTITUTIONAL:  No acute distress    MENTAL STATUS:  Awake, alert, fully oriented to self, place, and time, with present short-term memory, good awareness of recent events, normal attention span, concentration, and fund of knowledge.    SPEECH AND LANGUAGE:  Can name and repeat, follows all commands, has no dysarthria    CRANIAL NERVES:  II-Vision present, visual fields full to confrontational testing    III/IV/VI--EOMs are present in all directions.  Pupils are symmetrically reactive in dim light.  No ptosis.    V--Normal facial sensation.    VII--No facial asymmetry.    VIII--Hearing present to voice bilaterally.    IX/X--Symmetric soft palate rise.    XI--Normal trapezius power bilaterally.    XII--Tongue protrudes without deviation.    MOTOR:  Normal power, tone, and bulk in both arms and both legs.    SENSORY:  Reduced pin sensation in the left medial and lateral shin.  Otherwise normal pin sensation in both arms (including hands, improved) and both legs without distal-proximal gradient, other asymmetry, or spinal sensory level.    COORDINATION:  Normal finger-to-nose and heel-to-shin testing in both arms and both legs.    REFLEXES are normal and symmetric at the biceps, triceps, brachioradialis, patella, and ankle.  The plantar responses are flexor.    GAIT is steady with single-post cane, without steppage, ataxia, shuffling, or spasticity.      Terry Cardenas Jr., M.D., FAAN

## 2024-06-07 ENCOUNTER — HOSPITAL ENCOUNTER (INPATIENT)
Facility: HOSPITAL | Age: 84
End: 2024-06-07
Attending: EMERGENCY MEDICINE | Admitting: INTERNAL MEDICINE
Payer: MEDICARE

## 2024-06-07 ENCOUNTER — APPOINTMENT (OUTPATIENT)
Dept: RADIOLOGY | Facility: HOSPITAL | Age: 84
End: 2024-06-07
Payer: MEDICARE

## 2024-06-07 DIAGNOSIS — H35.30 MACULAR DEGENERATION: ICD-10-CM

## 2024-06-07 DIAGNOSIS — G57.63 MORTON'S NEUROMA OF BOTH FEET: ICD-10-CM

## 2024-06-07 DIAGNOSIS — M54.16 LUMBAR RADICULAR PAIN: ICD-10-CM

## 2024-06-07 DIAGNOSIS — J30.9 ALLERGIC RHINITIS: ICD-10-CM

## 2024-06-07 DIAGNOSIS — R73.03 PREDIABETES: ICD-10-CM

## 2024-06-07 DIAGNOSIS — G62.9 NEUROPATHY: ICD-10-CM

## 2024-06-07 DIAGNOSIS — M48.061 LUMBAR SPINAL STENOSIS: ICD-10-CM

## 2024-06-07 DIAGNOSIS — M48.00 SPINAL STENOSIS: ICD-10-CM

## 2024-06-07 DIAGNOSIS — M81.0 AGE RELATED OSTEOPOROSIS: ICD-10-CM

## 2024-06-07 DIAGNOSIS — T50.905A DRESS SYNDROME: ICD-10-CM

## 2024-06-07 DIAGNOSIS — E78.00 ELEVATED SERUM CHOLESTEROL: ICD-10-CM

## 2024-06-07 DIAGNOSIS — R60.0 BILATERAL LEG EDEMA: ICD-10-CM

## 2024-06-07 DIAGNOSIS — R53.83 FATIGUE: ICD-10-CM

## 2024-06-07 DIAGNOSIS — R41.0 DISORIENTATION: Primary | ICD-10-CM

## 2024-06-07 DIAGNOSIS — G47.9 SLEEP DISTURBANCE: ICD-10-CM

## 2024-06-07 DIAGNOSIS — I10 ESSENTIAL HYPERTENSION: ICD-10-CM

## 2024-06-07 DIAGNOSIS — J45.909 ASTHMA (HHS-HCC): ICD-10-CM

## 2024-06-07 DIAGNOSIS — M06.9 RHEUMATOID ARTHRITIS (MULTI): ICD-10-CM

## 2024-06-07 DIAGNOSIS — R44.1 VISUAL HALLUCINATION: ICD-10-CM
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
## 2024-06-07 DIAGNOSIS — R05.9 COUGH: ICD-10-CM

## 2024-06-07 DIAGNOSIS — I10 HYPERTENSION: ICD-10-CM

## 2024-06-07 DIAGNOSIS — H90.5 SENSORINEURAL HEARING LOSS OF LEFT EAR: ICD-10-CM

## 2024-06-07 DIAGNOSIS — M79.671 PAIN IN BOTH FEET: ICD-10-CM

## 2024-06-07 DIAGNOSIS — M53.3 SACROILIAC PAIN: ICD-10-CM

## 2024-06-07 DIAGNOSIS — G57.51 TARSAL TUNNEL SYNDROME OF RIGHT SIDE: ICD-10-CM

## 2024-06-07 DIAGNOSIS — M19.071 OSTEOARTHRITIS OF RIGHT ANKLE AND FOOT: ICD-10-CM

## 2024-06-07 DIAGNOSIS — R21 RASH AND OTHER NONSPECIFIC SKIN ERUPTION: ICD-10-CM

## 2024-06-07 DIAGNOSIS — M79.672 PAIN IN BOTH FEET: ICD-10-CM

## 2024-06-07 DIAGNOSIS — K59.00 CONSTIPATION: ICD-10-CM

## 2024-06-07 DIAGNOSIS — G56.03 CARPAL TUNNEL SYNDROME ON BOTH SIDES: ICD-10-CM

## 2024-06-07 DIAGNOSIS — D72.12 DRESS SYNDROME: ICD-10-CM

## 2024-06-07 DIAGNOSIS — E87.6 HYPOKALEMIA: ICD-10-CM

## 2024-06-07 LAB
ANION GAP BLDV CALCULATED.4IONS-SCNC: 15 MMOL/L (ref 10–25)
ANION GAP SERPL CALC-SCNC: 15 MMOL/L (ref 10–20)
BASE EXCESS BLDV CALC-SCNC: -4.9 MMOL/L (ref -2–3)
BASOPHILS # BLD MANUAL: 0 X10*3/UL (ref 0–0.1)
BASOPHILS NFR BLD MANUAL: 0 %
BODY TEMPERATURE: 37 DEGREES CELSIUS
BUN SERPL-MCNC: 20 MG/DL (ref 6–23)
CA-I BLDV-SCNC: 1.42 MMOL/L (ref 1.1–1.33)
CALCIUM SERPL-MCNC: 9.5 MG/DL (ref 8.6–10.6)
CHLORIDE BLDV-SCNC: 107 MMOL/L (ref 98–107)
CHLORIDE SERPL-SCNC: 109 MMOL/L (ref 98–107)
CO2 SERPL-SCNC: 20 MMOL/L (ref 21–32)
CREAT SERPL-MCNC: 1.03 MG/DL (ref 0.5–1.05)
EGFRCR SERPLBLD CKD-EPI 2021: 54 ML/MIN/1.73M*2
EOSINOPHIL # BLD MANUAL: 0 X10*3/UL (ref 0–0.4)
EOSINOPHIL NFR BLD MANUAL: 0 %
ERYTHROCYTE [DISTWIDTH] IN BLOOD BY AUTOMATED COUNT: 12.8 % (ref 11.5–14.5)
GLUCOSE BLDV-MCNC: 112 MG/DL (ref 74–99)
GLUCOSE SERPL-MCNC: 120 MG/DL (ref 74–99)
HCO3 BLDV-SCNC: 19.7 MMOL/L (ref 22–26)
HCT VFR BLD AUTO: 26.3 % (ref 36–46)
HCT VFR BLD EST: 34 % (ref 36–46)
HGB BLD-MCNC: 9.5 G/DL (ref 12–16)
HGB BLDV-MCNC: 11.2 G/DL (ref 12–16)
IMM GRANULOCYTES # BLD AUTO: 0.05 X10*3/UL (ref 0–0.5)
IMM GRANULOCYTES NFR BLD AUTO: 0.7 % (ref 0–0.9)
INHALED O2 CONCENTRATION: 24 %
LACTATE BLDV-SCNC: 1.9 MMOL/L (ref 0.4–2)
LYMPHOCYTES # BLD MANUAL: 0.94 X10*3/UL (ref 0.8–3)
LYMPHOCYTES NFR BLD MANUAL: 13.6 %
MAGNESIUM SERPL-MCNC: 1.8 MG/DL (ref 1.6–2.4)
MCH RBC QN AUTO: 29.1 PG (ref 26–34)
MCHC RBC AUTO-ENTMCNC: 36.1 G/DL (ref 32–36)
MCV RBC AUTO: 80 FL (ref 80–100)
MONOCYTES # BLD MANUAL: 0.41 X10*3/UL (ref 0.05–0.8)
MONOCYTES NFR BLD MANUAL: 5.9 %
NEUTS SEG # BLD MANUAL: 5.38 X10*3/UL (ref 1.6–5)
NEUTS SEG NFR BLD MANUAL: 78 %
NRBC BLD-RTO: 0 /100 WBCS (ref 0–0)
OXYHGB MFR BLDV: 53.1 % (ref 45–75)
PCO2 BLDV: 34 MM HG (ref 41–51)
PH BLDV: 7.37 PH (ref 7.33–7.43)
PHOSPHATE SERPL-MCNC: 2.1 MG/DL (ref 2.5–4.9)
PLATELET # BLD AUTO: 184 X10*3/UL (ref 150–450)
PO2 BLDV: 31 MM HG (ref 35–45)
POTASSIUM BLDV-SCNC: 3.3 MMOL/L (ref 3.5–5.3)
POTASSIUM SERPL-SCNC: 3.9 MMOL/L (ref 3.5–5.3)
RBC # BLD AUTO: 3.27 X10*6/UL (ref 4–5.2)
RBC MORPH BLD: ABNORMAL
SAO2 % BLDV: 54 % (ref 45–75)
SODIUM BLDV-SCNC: 138 MMOL/L (ref 136–145)
SODIUM SERPL-SCNC: 140 MMOL/L (ref 136–145)
TOTAL CELLS COUNTED BLD: 118
TSH SERPL-ACNC: 1.74 MIU/L (ref 0.44–3.98)
VARIANT LYMPHS # BLD MANUAL: 0.17 X10*3/UL (ref 0–0.3)
VARIANT LYMPHS NFR BLD: 2.5 %
WBC # BLD AUTO: 6.9 X10*3/UL (ref 4.4–11.3)

## 2024-06-07 PROCEDURE — 83735 ASSAY OF MAGNESIUM: CPT

## 2024-06-07 PROCEDURE — 70450 CT HEAD/BRAIN W/O DYE: CPT

## 2024-06-07 PROCEDURE — 36415 COLL VENOUS BLD VENIPUNCTURE: CPT

## 2024-06-07 PROCEDURE — 70450 CT HEAD/BRAIN W/O DYE: CPT | Performed by: RADIOLOGY

## 2024-06-07 PROCEDURE — 2500000004 HC RX 250 GENERAL PHARMACY W/ HCPCS (ALT 636 FOR OP/ED)

## 2024-06-07 PROCEDURE — 96361 HYDRATE IV INFUSION ADD-ON: CPT

## 2024-06-07 PROCEDURE — 84075 ASSAY ALKALINE PHOSPHATASE: CPT

## 2024-06-07 PROCEDURE — 99285 EMERGENCY DEPT VISIT HI MDM: CPT | Performed by: EMERGENCY MEDICINE

## 2024-06-07 PROCEDURE — 84132 ASSAY OF SERUM POTASSIUM: CPT

## 2024-06-07 PROCEDURE — 71045 X-RAY EXAM CHEST 1 VIEW: CPT | Mod: FOREIGN READ | Performed by: RADIOLOGY

## 2024-06-07 PROCEDURE — 71045 X-RAY EXAM CHEST 1 VIEW: CPT

## 2024-06-07 PROCEDURE — 84100 ASSAY OF PHOSPHORUS: CPT

## 2024-06-07 PROCEDURE — 85027 COMPLETE CBC AUTOMATED: CPT

## 2024-06-07 PROCEDURE — 84484 ASSAY OF TROPONIN QUANT: CPT

## 2024-06-07 PROCEDURE — 87636 SARSCOV2 & INF A&B AMP PRB: CPT

## 2024-06-07 PROCEDURE — 80053 COMPREHEN METABOLIC PANEL: CPT

## 2024-06-07 PROCEDURE — 82077 ASSAY SPEC XCP UR&BREATH IA: CPT

## 2024-06-07 PROCEDURE — 93010 ELECTROCARDIOGRAM REPORT: CPT | Performed by: EMERGENCY MEDICINE

## 2024-06-07 PROCEDURE — 84443 ASSAY THYROID STIM HORMONE: CPT

## 2024-06-07 PROCEDURE — 85007 BL SMEAR W/DIFF WBC COUNT: CPT

## 2024-06-07 PROCEDURE — 99285 EMERGENCY DEPT VISIT HI MDM: CPT

## 2024-06-07 RX ADMIN — SODIUM CHLORIDE, POTASSIUM CHLORIDE, SODIUM LACTATE AND CALCIUM CHLORIDE 500 ML: 600; 310; 30; 20 INJECTION, SOLUTION INTRAVENOUS at 23:02

## 2024-06-07 ASSESSMENT — COLUMBIA-SUICIDE SEVERITY RATING SCALE - C-SSRS
2. HAVE YOU ACTUALLY HAD ANY THOUGHTS OF KILLING YOURSELF?: NO
6. HAVE YOU EVER DONE ANYTHING, STARTED TO DO ANYTHING, OR PREPARED TO DO ANYTHING TO END YOUR LIFE?: NO
1. IN THE PAST MONTH, HAVE YOU WISHED YOU WERE DEAD OR WISHED YOU COULD GO TO SLEEP AND NOT WAKE UP?: NO

## 2024-06-08 ENCOUNTER — APPOINTMENT (OUTPATIENT)
Dept: CARDIOLOGY | Facility: HOSPITAL | Age: 84
End: 2024-06-08
Payer: MEDICARE

## 2024-06-08 ENCOUNTER — APPOINTMENT (OUTPATIENT)
Dept: RADIOLOGY | Facility: HOSPITAL | Age: 84
End: 2024-06-08
Payer: MEDICARE

## 2024-06-08 PROBLEM — R41.0 DISORIENTATION: Status: ACTIVE | Noted: 2024-06-08

## 2024-06-08 LAB
ABO GROUP (TYPE) IN BLOOD: NORMAL
ALBUMIN SERPL BCP-MCNC: 3.2 G/DL (ref 3.4–5)
ALP SERPL-CCNC: 179 U/L (ref 33–136)
ALT SERPL W P-5'-P-CCNC: 137 U/L (ref 7–45)
AMMONIA PLAS-SCNC: 30 UMOL/L (ref 16–53)
AMPHETAMINES UR QL SCN: NORMAL
ANTIBODY SCREEN: NORMAL
APPEARANCE UR: CLEAR
APPEARANCE UR: CLEAR
AST SERPL W P-5'-P-CCNC: 146 U/L (ref 9–39)
BARBITURATES UR QL SCN: NORMAL
BENZODIAZ UR QL SCN: NORMAL
BILIRUB DIRECT SERPL-MCNC: 0 MG/DL (ref 0–0.3)
BILIRUB SERPL-MCNC: 0.5 MG/DL (ref 0–1.2)
BILIRUB UR STRIP.AUTO-MCNC: NEGATIVE MG/DL
BILIRUB UR STRIP.AUTO-MCNC: NEGATIVE MG/DL
BZE UR QL SCN: NORMAL
CANNABINOIDS UR QL SCN: NORMAL
CARDIAC TROPONIN I PNL SERPL HS: 15 NG/L (ref 0–34)
COLOR UR: YELLOW
COLOR UR: YELLOW
ETHANOL SERPL-MCNC: <10 MG/DL
FENTANYL+NORFENTANYL UR QL SCN: NORMAL
FLUAV RNA RESP QL NAA+PROBE: NOT DETECTED
FLUBV RNA RESP QL NAA+PROBE: NOT DETECTED
GLUCOSE UR STRIP.AUTO-MCNC: NORMAL MG/DL
GLUCOSE UR STRIP.AUTO-MCNC: NORMAL MG/DL
HOLD SPECIMEN: NORMAL
HOLD SPECIMEN: NORMAL
KETONES UR STRIP.AUTO-MCNC: ABNORMAL MG/DL
KETONES UR STRIP.AUTO-MCNC: ABNORMAL MG/DL
LACTATE SERPL-SCNC: 2 MMOL/L (ref 0.4–2)
LEUKOCYTE ESTERASE UR QL STRIP.AUTO: NEGATIVE
LEUKOCYTE ESTERASE UR QL STRIP.AUTO: NEGATIVE
METHADONE UR QL SCN: NORMAL
MUCOUS THREADS #/AREA URNS AUTO: NORMAL /LPF
NITRITE UR QL STRIP.AUTO: NEGATIVE
NITRITE UR QL STRIP.AUTO: NEGATIVE
OPIATES UR QL SCN: NORMAL
OXYCODONE+OXYMORPHONE UR QL SCN: NORMAL
PCP UR QL SCN: NORMAL
PH UR STRIP.AUTO: 5.5 [PH]
PH UR STRIP.AUTO: 5.5 [PH]
PROT SERPL-MCNC: 6.1 G/DL (ref 6.4–8.2)
PROT UR STRIP.AUTO-MCNC: ABNORMAL MG/DL
PROT UR STRIP.AUTO-MCNC: ABNORMAL MG/DL
RBC # UR STRIP.AUTO: ABNORMAL /UL
RBC # UR STRIP.AUTO: ABNORMAL /UL
RBC #/AREA URNS AUTO: NORMAL /HPF
RBC #/AREA URNS AUTO: NORMAL /HPF
RH FACTOR (ANTIGEN D): NORMAL
SARS-COV-2 RNA RESP QL NAA+PROBE: NOT DETECTED
SP GR UR STRIP.AUTO: 1.01
SP GR UR STRIP.AUTO: 1.01
UROBILINOGEN UR STRIP.AUTO-MCNC: NORMAL MG/DL
UROBILINOGEN UR STRIP.AUTO-MCNC: NORMAL MG/DL
WBC #/AREA URNS AUTO: NORMAL /HPF
WBC #/AREA URNS AUTO: NORMAL /HPF

## 2024-06-08 PROCEDURE — 36415 COLL VENOUS BLD VENIPUNCTURE: CPT

## 2024-06-08 PROCEDURE — 83605 ASSAY OF LACTIC ACID: CPT

## 2024-06-08 PROCEDURE — 82140 ASSAY OF AMMONIA: CPT

## 2024-06-08 PROCEDURE — 2500000004 HC RX 250 GENERAL PHARMACY W/ HCPCS (ALT 636 FOR OP/ED)

## 2024-06-08 PROCEDURE — 76705 ECHO EXAM OF ABDOMEN: CPT

## 2024-06-08 PROCEDURE — 2500000004 HC RX 250 GENERAL PHARMACY W/ HCPCS (ALT 636 FOR OP/ED): Performed by: INTERNAL MEDICINE

## 2024-06-08 PROCEDURE — 76705 ECHO EXAM OF ABDOMEN: CPT | Performed by: STUDENT IN AN ORGANIZED HEALTH CARE EDUCATION/TRAINING PROGRAM

## 2024-06-08 PROCEDURE — 96374 THER/PROPH/DIAG INJ IV PUSH: CPT

## 2024-06-08 PROCEDURE — 99233 SBSQ HOSP IP/OBS HIGH 50: CPT

## 2024-06-08 PROCEDURE — 81001 URINALYSIS AUTO W/SCOPE: CPT | Mod: 91 | Performed by: INTERNAL MEDICINE

## 2024-06-08 PROCEDURE — 2500000001 HC RX 250 WO HCPCS SELF ADMINISTERED DRUGS (ALT 637 FOR MEDICARE OP)

## 2024-06-08 PROCEDURE — 87040 BLOOD CULTURE FOR BACTERIA: CPT | Mod: 91

## 2024-06-08 PROCEDURE — 1100000001 HC PRIVATE ROOM DAILY

## 2024-06-08 PROCEDURE — 93005 ELECTROCARDIOGRAM TRACING: CPT

## 2024-06-08 PROCEDURE — 80307 DRUG TEST PRSMV CHEM ANLYZR: CPT

## 2024-06-08 PROCEDURE — 86901 BLOOD TYPING SEROLOGIC RH(D): CPT

## 2024-06-08 PROCEDURE — 2500000001 HC RX 250 WO HCPCS SELF ADMINISTERED DRUGS (ALT 637 FOR MEDICARE OP): Performed by: INTERNAL MEDICINE

## 2024-06-08 PROCEDURE — 81001 URINALYSIS AUTO W/SCOPE: CPT

## 2024-06-08 PROCEDURE — 11104 PUNCH BX SKIN SINGLE LESION: CPT

## 2024-06-08 PROCEDURE — P9612 CATHETERIZE FOR URINE SPEC: HCPCS

## 2024-06-08 PROCEDURE — 0HB6XZX EXCISION OF BACK SKIN, EXTERNAL APPROACH, DIAGNOSTIC: ICD-10-PCS | Performed by: DERMATOLOGY

## 2024-06-08 PROCEDURE — 96375 TX/PRO/DX INJ NEW DRUG ADDON: CPT

## 2024-06-08 RX ORDER — HYDROXYZINE HYDROCHLORIDE 25 MG/1
25 TABLET, FILM COATED ORAL ONCE
Status: COMPLETED | OUTPATIENT
Start: 2024-06-08 | End: 2024-06-08

## 2024-06-08 RX ORDER — ENOXAPARIN SODIUM 100 MG/ML
30 INJECTION SUBCUTANEOUS
Status: DISCONTINUED | OUTPATIENT
Start: 2024-06-08 | End: 2024-06-13 | Stop reason: HOSPADM

## 2024-06-08 RX ORDER — HALOPERIDOL 5 MG/ML
2 INJECTION INTRAMUSCULAR ONCE
Status: COMPLETED | OUTPATIENT
Start: 2024-06-08 | End: 2024-06-08

## 2024-06-08 RX ORDER — IBUPROFEN 200 MG
200 CAPSULE ORAL DAILY
Status: DISCONTINUED | OUTPATIENT
Start: 2024-06-08 | End: 2024-06-08

## 2024-06-08 RX ORDER — HALOPERIDOL 5 MG/ML
2 INJECTION INTRAMUSCULAR ONCE
Status: DISCONTINUED | OUTPATIENT
Start: 2024-06-08 | End: 2024-06-08

## 2024-06-08 RX ORDER — PRAVASTATIN SODIUM 20 MG/1
40 TABLET ORAL DAILY
Status: DISCONTINUED | OUTPATIENT
Start: 2024-06-08 | End: 2024-06-13 | Stop reason: HOSPADM

## 2024-06-08 RX ORDER — DIPHENHYDRAMINE HYDROCHLORIDE 50 MG/ML
50 INJECTION INTRAMUSCULAR; INTRAVENOUS ONCE
Status: COMPLETED | OUTPATIENT
Start: 2024-06-08 | End: 2024-06-08

## 2024-06-08 RX ORDER — METHYLPREDNISOLONE 4 MG/1
TABLET ORAL
COMMUNITY
End: 2024-06-13 | Stop reason: HOSPADM

## 2024-06-08 RX ORDER — ACETAMINOPHEN 325 MG/1
650 TABLET ORAL EVERY 6 HOURS PRN
Status: DISCONTINUED | OUTPATIENT
Start: 2024-06-08 | End: 2024-06-08

## 2024-06-08 RX ORDER — POLYETHYLENE GLYCOL 3350 17 G/17G
17 POWDER, FOR SOLUTION ORAL DAILY PRN
Status: DISCONTINUED | OUTPATIENT
Start: 2024-06-08 | End: 2024-06-13 | Stop reason: HOSPADM

## 2024-06-08 RX ORDER — MONTELUKAST SODIUM 10 MG/1
10 TABLET ORAL DAILY
Status: DISCONTINUED | OUTPATIENT
Start: 2024-06-08 | End: 2024-06-13 | Stop reason: HOSPADM

## 2024-06-08 RX ORDER — FAMOTIDINE 10 MG/ML
20 INJECTION INTRAVENOUS ONCE
Status: COMPLETED | OUTPATIENT
Start: 2024-06-08 | End: 2024-06-08

## 2024-06-08 RX ORDER — DICLOFENAC SODIUM 10 MG/G
4 GEL TOPICAL 4 TIMES DAILY PRN
Status: DISCONTINUED | OUTPATIENT
Start: 2024-06-08 | End: 2024-06-13 | Stop reason: HOSPADM

## 2024-06-08 RX ORDER — AMOXICILLIN 250 MG
1 CAPSULE ORAL NIGHTLY
Status: DISCONTINUED | OUTPATIENT
Start: 2024-06-08 | End: 2024-06-10

## 2024-06-08 RX ORDER — ACETAMINOPHEN 650 MG/1
650 SUPPOSITORY RECTAL EVERY 6 HOURS PRN
Status: DISCONTINUED | OUTPATIENT
Start: 2024-06-08 | End: 2024-06-13 | Stop reason: HOSPADM

## 2024-06-08 RX ORDER — LOSARTAN POTASSIUM 25 MG/1
50 TABLET ORAL DAILY
Status: DISCONTINUED | OUTPATIENT
Start: 2024-06-08 | End: 2024-06-13 | Stop reason: HOSPADM

## 2024-06-08 RX ORDER — SULFASALAZINE 500 MG/1
1000 TABLET ORAL DAILY
Status: DISCONTINUED | OUTPATIENT
Start: 2024-06-08 | End: 2024-06-11

## 2024-06-08 RX ORDER — AZELASTINE 1 MG/ML
2 SPRAY, METERED NASAL 2 TIMES DAILY
Status: DISCONTINUED | OUTPATIENT
Start: 2024-06-08 | End: 2024-06-13 | Stop reason: HOSPADM

## 2024-06-08 RX ORDER — BISACODYL 10 MG/1
10 SUPPOSITORY RECTAL ONCE
Status: DISCONTINUED | OUTPATIENT
Start: 2024-06-08 | End: 2024-06-08

## 2024-06-08 RX ORDER — HALOPERIDOL 5 MG/ML
INJECTION INTRAMUSCULAR
Status: COMPLETED
Start: 2024-06-08 | End: 2024-06-08

## 2024-06-08 RX ORDER — FERROUS SULFATE, DRIED 160(50) MG
1 TABLET, EXTENDED RELEASE ORAL 2 TIMES DAILY
Status: DISCONTINUED | OUTPATIENT
Start: 2024-06-08 | End: 2024-06-13 | Stop reason: HOSPADM

## 2024-06-08 RX ORDER — SODIUM CHLORIDE, SODIUM LACTATE, POTASSIUM CHLORIDE, CALCIUM CHLORIDE 600; 310; 30; 20 MG/100ML; MG/100ML; MG/100ML; MG/100ML
75 INJECTION, SOLUTION INTRAVENOUS CONTINUOUS
Status: ACTIVE | OUTPATIENT
Start: 2024-06-08 | End: 2024-06-09

## 2024-06-08 RX ORDER — LORATADINE 10 MG/1
5 TABLET ORAL DAILY
Status: DISCONTINUED | OUTPATIENT
Start: 2024-06-09 | End: 2024-06-10

## 2024-06-08 RX ADMIN — SENNOSIDES AND DOCUSATE SODIUM 1 TABLET: 50; 8.6 TABLET ORAL at 20:10

## 2024-06-08 RX ADMIN — SODIUM CHLORIDE, POTASSIUM CHLORIDE, SODIUM LACTATE AND CALCIUM CHLORIDE 75 ML/HR: 600; 310; 30; 20 INJECTION, SOLUTION INTRAVENOUS at 22:36

## 2024-06-08 RX ADMIN — FAMOTIDINE 20 MG: 10 INJECTION INTRAVENOUS at 04:15

## 2024-06-08 RX ADMIN — HALOPERIDOL LACTATE 2 MG: 5 INJECTION, SOLUTION INTRAMUSCULAR at 06:10

## 2024-06-08 RX ADMIN — Medication 1 TABLET: at 20:10

## 2024-06-08 RX ADMIN — HALOPERIDOL 2 MG: 5 INJECTION INTRAMUSCULAR at 06:10

## 2024-06-08 RX ADMIN — DIPHENHYDRAMINE HYDROCHLORIDE 50 MG: 50 INJECTION, SOLUTION INTRAMUSCULAR; INTRAVENOUS at 04:15

## 2024-06-08 RX ADMIN — HYDROXYZINE HYDROCHLORIDE 25 MG: 25 TABLET, FILM COATED ORAL at 20:10

## 2024-06-08 RX ADMIN — ACETAMINOPHEN 650 MG: 650 SUPPOSITORY RECTAL at 13:24

## 2024-06-08 RX ADMIN — SODIUM CHLORIDE, POTASSIUM CHLORIDE, SODIUM LACTATE AND CALCIUM CHLORIDE 500 ML: 600; 310; 30; 20 INJECTION, SOLUTION INTRAVENOUS at 12:12

## 2024-06-08 SDOH — SOCIAL STABILITY: SOCIAL INSECURITY: ARE YOU OR HAVE YOU BEEN THREATENED OR ABUSED PHYSICALLY, EMOTIONALLY, OR SEXUALLY BY ANYONE?: NO

## 2024-06-08 SDOH — SOCIAL STABILITY: SOCIAL INSECURITY: DO YOU FEEL UNSAFE GOING BACK TO THE PLACE WHERE YOU ARE LIVING?: NO

## 2024-06-08 SDOH — SOCIAL STABILITY: SOCIAL INSECURITY: HAS ANYONE EVER THREATENED TO HURT YOUR FAMILY OR YOUR PETS?: NO

## 2024-06-08 SDOH — SOCIAL STABILITY: SOCIAL INSECURITY: HAVE YOU HAD THOUGHTS OF HARMING ANYONE ELSE?: NO

## 2024-06-08 SDOH — SOCIAL STABILITY: SOCIAL INSECURITY: ABUSE: ADULT

## 2024-06-08 SDOH — SOCIAL STABILITY: SOCIAL INSECURITY: ARE THERE ANY APPARENT SIGNS OF INJURIES/BEHAVIORS THAT COULD BE RELATED TO ABUSE/NEGLECT?: NO

## 2024-06-08 SDOH — SOCIAL STABILITY: SOCIAL INSECURITY: DO YOU FEEL ANYONE HAS EXPLOITED OR TAKEN ADVANTAGE OF YOU FINANCIALLY OR OF YOUR PERSONAL PROPERTY?: NO

## 2024-06-08 SDOH — SOCIAL STABILITY: SOCIAL INSECURITY: DOES ANYONE TRY TO KEEP YOU FROM HAVING/CONTACTING OTHER FRIENDS OR DOING THINGS OUTSIDE YOUR HOME?: NO

## 2024-06-08 SDOH — SOCIAL STABILITY: SOCIAL INSECURITY: HAVE YOU HAD ANY THOUGHTS OF HARMING ANYONE ELSE?: NO

## 2024-06-08 ASSESSMENT — COGNITIVE AND FUNCTIONAL STATUS - GENERAL
HELP NEEDED FOR BATHING: A LITTLE
DRESSING REGULAR LOWER BODY CLOTHING: A LITTLE
MOVING TO AND FROM BED TO CHAIR: A LOT
MOVING FROM LYING ON BACK TO SITTING ON SIDE OF FLAT BED WITH BEDRAILS: A LITTLE
DRESSING REGULAR UPPER BODY CLOTHING: A LITTLE
DAILY ACTIVITIY SCORE: 18
TURNING FROM BACK TO SIDE WHILE IN FLAT BAD: A LITTLE
MOBILITY SCORE: 13
PERSONAL GROOMING: A LITTLE
PATIENT BASELINE BEDBOUND: UNABLE TO ASSESS AT THIS TIME
WALKING IN HOSPITAL ROOM: A LOT
CLIMB 3 TO 5 STEPS WITH RAILING: TOTAL
TOILETING: A LITTLE
STANDING UP FROM CHAIR USING ARMS: A LOT
EATING MEALS: A LITTLE

## 2024-06-08 ASSESSMENT — LIFESTYLE VARIABLES
TOTAL SCORE: 0
AUDIT-C TOTAL SCORE: 0
HOW OFTEN DO YOU HAVE A DRINK CONTAINING ALCOHOL: NEVER
HOW OFTEN DO YOU HAVE 6 OR MORE DRINKS ON ONE OCCASION: NEVER
EVER FELT BAD OR GUILTY ABOUT YOUR DRINKING: NO
HOW MANY STANDARD DRINKS CONTAINING ALCOHOL DO YOU HAVE ON A TYPICAL DAY: PATIENT DOES NOT DRINK
SKIP TO QUESTIONS 9-10: 1
AUDIT-C TOTAL SCORE: 0
EVER HAD A DRINK FIRST THING IN THE MORNING TO STEADY YOUR NERVES TO GET RID OF A HANGOVER: NO
HAVE PEOPLE ANNOYED YOU BY CRITICIZING YOUR DRINKING: NO
HAVE YOU EVER FELT YOU SHOULD CUT DOWN ON YOUR DRINKING: NO

## 2024-06-08 ASSESSMENT — PAIN - FUNCTIONAL ASSESSMENT: PAIN_FUNCTIONAL_ASSESSMENT: 0-10

## 2024-06-08 ASSESSMENT — PAIN SCALES - GENERAL
PAINLEVEL_OUTOF10: 0 - NO PAIN

## 2024-06-08 ASSESSMENT — ACTIVITIES OF DAILY LIVING (ADL)
TOILETING: UNABLE TO ASSESS
LACK_OF_TRANSPORTATION: PATIENT UNABLE TO ANSWER
FEEDING YOURSELF: UNABLE TO ASSESS
WALKS IN HOME: UNABLE TO ASSESS
HEARING - LEFT EAR: FUNCTIONAL
HEARING - RIGHT EAR: FUNCTIONAL
JUDGMENT_ADEQUATE_SAFELY_COMPLETE_DAILY_ACTIVITIES: NO
ASSISTIVE_DEVICE: WALKER;WHEELCHAIR
PATIENT'S MEMORY ADEQUATE TO SAFELY COMPLETE DAILY ACTIVITIES?: NO
DRESSING YOURSELF: UNABLE TO ASSESS
GROOMING: UNABLE TO ASSESS
ADEQUATE_TO_COMPLETE_ADL: YES
BATHING: UNABLE TO ASSESS

## 2024-06-08 ASSESSMENT — ENCOUNTER SYMPTOMS
CONFUSION: 1
AGITATION: 1
SHORTNESS OF BREATH: 0

## 2024-06-08 ASSESSMENT — PATIENT HEALTH QUESTIONNAIRE - PHQ9
2. FEELING DOWN, DEPRESSED OR HOPELESS: NOT AT ALL
SUM OF ALL RESPONSES TO PHQ9 QUESTIONS 1 & 2: 0
1. LITTLE INTEREST OR PLEASURE IN DOING THINGS: NOT AT ALL

## 2024-06-08 NOTE — CONSULTS
Inpatient consult to Dermatology  Consult performed by: Pearl Garcia DO  Consult ordered by: Aretha Newberry DO      DERMATOLOGY DEPARTMENT CONSULTATION NOTE  Name: Kerry Arevalo  MRN: [id number]  : 1940    Reason for consultation: urticarial rash     History of Present Illness  Kerry Arevalo is a 84 y.o. female with a past medical history of HTN, T2DM, OA, RA, and spinal stenosis c/b lumbar radiculopathy  presented on 2024 with slurred speech and altered mental status and was admitted for workup of confusion . Dermatology was consulted for rash.    Patient is altered during exam. History taken from chart review and family members. Patient had not been feeling well for the last 3-4 days, complaining of some chest pain as well as having hallucinations per her daughter. She was transported via ambulance , and EKG was concerning for STEMI. Per review of H&P, it is thought that she was given aspirin en route. By the time she was brought into the ED, she had a full body rash that was not present when she left her home. Patient states the rash is itchy and sometimes painful. Family denies any shortness of breath, fever, chills. Family members state she recently took oral steroid pack and tizanidine from her neurologist  3 weeks ago as her only new medications. After chart review, patient was previously taking tizanidine, but did receive a new drug, sulfasalazine, from her rheumatologist 24.       Review of Systems  Review of Systems   Respiratory:  Negative for shortness of breath.    Skin:  Positive for rash.   Psychiatric/Behavioral:  Positive for agitation and confusion.         Past Medical History  Past Medical History:   Diagnosis Date    Body mass index (BMI) 25.0-25.9, adult 2019    Body mass index (BMI) of 25.0 to 25.9 in adult    Body mass index (BMI) 27.0-27.9, adult 2022    Body mass index (BMI) of 27.0 to 27.9 in adult    Body mass index (BMI) 28.0-28.9, adult 2022     Body mass index (BMI) of 28.0 to 28.9 in adult    Body mass index (BMI) 29.0-29.9, adult 08/16/2021    Body mass index (BMI) of 29.0 to 29.9 in adult    Body mass index (BMI)30.0-30.9, adult 01/27/2021    Body mass index (BMI) of 30.0 to 30.9 in adult    Disappearance and death of family member 10/31/2013    Bereavement, uncomplicated    Encounter for screening mammogram for malignant neoplasm of breast 06/27/2016    Encounter for screening mammogram for breast cancer    Macular cyst, hole, or pseudohole, right eye     Macular hole, right    Menopausal and female climacteric states 11/04/2015    Menopausal symptoms    Other postherpetic nervous system involvement 07/24/2017    Postherpetic neuralgia    Other specified disorders of left ear 12/27/2016    Fullness in ear, left    Personal history of diseases of the blood and blood-forming organs and certain disorders involving the immune mechanism 02/19/2013    History of anemia    Personal history of diseases of the skin and subcutaneous tissue 05/12/2013    History of hair or hair follicle disorder    Personal history of other diseases of the musculoskeletal system and connective tissue     History of arthritis    Personal history of other diseases of the nervous system and sense organs     History of cataract    Personal history of other diseases of the respiratory system 03/04/2019    History of acute bronchitis    Personal history of other infectious and parasitic diseases 03/09/2017    History of herpes zoster    Personal history of other specified conditions 04/11/2019    History of weight loss    Unspecified asthma, uncomplicated ([facility]) 12/19/2022    Asthma       Past Surgical History   has a past surgical history that includes Colonoscopy (02/19/2013); Total abdominal hysterectomy w/ bilateral salpingoophorectomy (02/19/2013); Other surgical history (05/12/2013); Hernia repair (09/26/2016); and Cataract extraction (10/14/2014).     Allergies  Allergies    Allergen Reactions    Duloxetine Other     Weight loss    Topiramate Other     Appetite and  weight loss       Medications  Scheduled Meds: azelastine, 2 spray, Each Nostril, BID  calcium carbonate-vitamin D3, 1 tablet, oral, BID  enoxaparin, 30 mg, subcutaneous, q24h ELYSE  losartan, 50 mg, oral, Daily  montelukast, 10 mg, oral, Daily  multivitamin with minerals iron-free, 1 tablet, oral, Daily  potassium phosphate, 15 mmol, intravenous, Once  [Held by provider] pravastatin, 40 mg, oral, Daily  predniSONE, 50 mg, oral, Daily  sennosides-docusate sodium, 1 tablet, oral, Nightly  [Held by provider] sulfaSALAzine, 1,000 mg, oral, Daily       Continuous Infusions:     PRN Meds: PRN medications: acetaminophen, diclofenac sodium, polyethylene glycol     Family History  Family History   Problem Relation Name Age of Onset    Heart failure Mother      Osteoporosis Mother      Other (cerebral hemorrhage) Father      Alzheimer's disease Sister      Heart attack Brother      Breast cancer Other         Social History   reports that she has never smoked. She has never used smokeless tobacco. She reports that she does not currently use alcohol. She reports that she does not use drugs.     Objective    Vitals:    06/08/24 1219 06/08/24 1303 06/08/24 1330 06/08/24 1424   BP: 129/85 161/73  161/73   Pulse:   88 88   Resp: 18  18 18   Temp: (!) 39.4 °C (102.9 °F)   (!) 38.4 °C (101.1 °F)   TempSrc: Temporal   Temporal   SpO2: 95%   94%        Exam    GEN: no acute distress  NEURO: moving all extremities   EYES: conjunctiva and eyelids normal. No conjunctival injection or erosions appreciated  ENT:   - Lips: normal  - Teeth/gums: normal  - Oropharynx: normal tongue and mucosa  NECK: normal and symmetric.   CV: no varicosities, warmth or tenderness of extremities.  GI: Flat abdomen. Non-tender. No hepatosplenomegaly.  EXTREMITIES: no distal digital clubbing, cyanosis, petechiae   SKIN: A full body skin exam including scalp, face,  eyes, ears, neck, trunk, bilateral upper & lower extremities, toenails and fingernails were examined with the following findings:  Involving the trunk, upper and lower bilateral extremities, facial cheeks, forehead, and bilateral ears, there are erythematous urticarial plaques. On the left dorsal foot, there is red-purple patch with very slight scale       Laboratory and Data  Results for orders placed or performed during the hospital encounter of 06/07/24 (from the past 24 hour(s))   CBC and Auto Differential   Result Value Ref Range    WBC 6.9 4.4 - 11.3 x10*3/uL    nRBC 0.0 0.0 - 0.0 /100 WBCs    RBC 3.27 (L) 4.00 - 5.20 x10*6/uL    Hemoglobin 9.5 (L) 12.0 - 16.0 g/dL    Hematocrit 26.3 (L) 36.0 - 46.0 %    MCV 80 80 - 100 fL    MCH 29.1 26.0 - 34.0 pg    MCHC 36.1 (H) 32.0 - 36.0 g/dL    RDW 12.8 11.5 - 14.5 %    Platelets 184 150 - 450 x10*3/uL    Immature Granulocytes %, Automated 0.7 0.0 - 0.9 %    Immature Granulocytes Absolute, Automated 0.05 0.00 - 0.50 x10*3/uL   Basic metabolic panel   Result Value Ref Range    Glucose 120 (H) 74 - 99 mg/dL    Sodium 140 136 - 145 mmol/L    Potassium 3.9 3.5 - 5.3 mmol/L    Chloride 109 (H) 98 - 107 mmol/L    Bicarbonate 20 (L) 21 - 32 mmol/L    Anion Gap 15 10 - 20 mmol/L    Urea Nitrogen 20 6 - 23 mg/dL    Creatinine 1.03 0.50 - 1.05 mg/dL    eGFR 54 (L) >60 mL/min/1.73m*2    Calcium 9.5 8.6 - 10.6 mg/dL   Phosphorus   Result Value Ref Range    Phosphorus 2.1 (L) 2.5 - 4.9 mg/dL   Magnesium   Result Value Ref Range    Magnesium 1.80 1.60 - 2.40 mg/dL   Blood Gas Venous Full Panel   Result Value Ref Range    POCT pH, Venous 7.37 7.33 - 7.43 pH    POCT pCO2, Venous 34 (L) 41 - 51 mm Hg    POCT pO2, Venous 31 (L) 35 - 45 mm Hg    POCT SO2, Venous 54 45 - 75 %    POCT Oxy Hemoglobin, Venous 53.1 45.0 - 75.0 %    POCT Hematocrit Calculated, Venous 34.0 (L) 36.0 - 46.0 %    POCT Sodium, Venous 138 136 - 145 mmol/L    POCT Potassium, Venous 3.3 (L) 3.5 - 5.3 mmol/L    POCT  Chloride, Venous 107 98 - 107 mmol/L    POCT Ionized Calicum, Venous 1.42 (H) 1.10 - 1.33 mmol/L    POCT Glucose, Venous 112 (H) 74 - 99 mg/dL    POCT Lactate, Venous 1.9 0.4 - 2.0 mmol/L    POCT Base Excess, Venous -4.9 (L) -2.0 - 3.0 mmol/L    POCT HCO3 Calculated, Venous 19.7 (L) 22.0 - 26.0 mmol/L    POCT Hemoglobin, Venous 11.2 (L) 12.0 - 16.0 g/dL    POCT Anion Gap, Venous 15.0 10.0 - 25.0 mmol/L    Patient Temperature 37.0 degrees Celsius    FiO2 24 %   TSH with reflex to Free T4 if abnormal   Result Value Ref Range    Thyroid Stimulating Hormone 1.74 0.44 - 3.98 mIU/L   Manual Differential   Result Value Ref Range    Neutrophils %, Manual 78.0 40.0 - 80.0 %    Lymphocytes %, Manual 13.6 13.0 - 44.0 %    Monocytes %, Manual 5.9 2.0 - 10.0 %    Eosinophils %, Manual 0.0 0.0 - 6.0 %    Basophils %, Manual 0.0 0.0 - 2.0 %    Atypical Lymphocytes %, Manual 2.5 0.0 - 2.0 %    Seg Neutrophils Absolute, Manual 5.38 (H) 1.60 - 5.00 x10*3/uL    Lymphocytes Absolute, Manual 0.94 0.80 - 3.00 x10*3/uL    Monocytes Absolute, Manual 0.41 0.05 - 0.80 x10*3/uL    Eosinophils Absolute, Manual 0.00 0.00 - 0.40 x10*3/uL    Basophils Absolute, Manual 0.00 0.00 - 0.10 x10*3/uL    Atypical Lymphs Absolute, Manual 0.17 0.00 - 0.30 x10*3/uL    Total Cells Counted 118     RBC Morphology No significant RBC morphology present    Hepatic function panel   Result Value Ref Range    Albumin 3.2 (L) 3.4 - 5.0 g/dL    Bilirubin, Total 0.5 0.0 - 1.2 mg/dL    Bilirubin, Direct 0.0 0.0 - 0.3 mg/dL    Alkaline Phosphatase 179 (H) 33 - 136 U/L     (H) 7 - 45 U/L     (H) 9 - 39 U/L    Total Protein 6.1 (L) 6.4 - 8.2 g/dL   Troponin I, High Sensitivity   Result Value Ref Range    Troponin I, High Sensitivity 15 0 - 34 ng/L   Alcohol   Result Value Ref Range    Alcohol <10 <=10 mg/dL   Sars-CoV-2 PCR   Result Value Ref Range    Coronavirus 2019, PCR Not Detected Not Detected   Influenza A, and B PCR   Result Value Ref Range    Flu A  Result Not Detected Not Detected    Flu B Result Not Detected Not Detected   Ammonia   Result Value Ref Range    Ammonia 30 16 - 53 umol/L   Type And Screen   Result Value Ref Range    ABO TYPE O     Rh TYPE POS     ANTIBODY SCREEN NEG    DRUG SCREEN,URINE   Result Value Ref Range    Amphetamine Screen, Urine Presumptive Negative Presumptive Negative    Barbiturate Screen, Urine Presumptive Negative Presumptive Negative    Benzodiazepines Screen, Urine Presumptive Negative Presumptive Negative    Cannabinoid Screen, Urine Presumptive Negative Presumptive Negative    Cocaine Metabolite Screen, Urine Presumptive Negative Presumptive Negative    Fentanyl Screen, Urine Presumptive Negative Presumptive Negative    Opiate Screen, Urine Presumptive Negative Presumptive Negative    Oxycodone Screen, Urine Presumptive Negative Presumptive Negative    PCP Screen, Urine Presumptive Negative Presumptive Negative    Methadone Screen, Urine Presumptive Negative Presumptive Negative   Urinalysis with Reflex Microscopic   Result Value Ref Range    Color, Urine Yellow Light-Yellow, Yellow, Dark-Yellow    Appearance, Urine Clear Clear    Specific Gravity, Urine 1.013 1.005 - 1.035    pH, Urine 5.5 5.0, 5.5, 6.0, 6.5, 7.0, 7.5, 8.0    Protein, Urine 30 (1+) (A) NEGATIVE, 10 (TRACE), 20 (TRACE) mg/dL    Glucose, Urine Normal Normal mg/dL    Blood, Urine 0.03 (TRACE) (A) NEGATIVE    Ketones, Urine 20 (1+) (A) NEGATIVE mg/dL    Bilirubin, Urine NEGATIVE NEGATIVE    Urobilinogen, Urine Normal Normal mg/dL    Nitrite, Urine NEGATIVE NEGATIVE    Leukocyte Esterase, Urine NEGATIVE NEGATIVE   Microscopic Only, Urine   Result Value Ref Range    WBC, Urine 1-5 1-5, NONE /HPF    RBC, Urine NONE NONE, 1-2, 3-5 /HPF   Urinalysis with Reflex Culture and Microscopic   Result Value Ref Range    Color, Urine Yellow Light-Yellow, Yellow, Dark-Yellow    Appearance, Urine Clear Clear    Specific Gravity, Urine 1.013 1.005 - 1.035    pH, Urine 5.5 5.0,  5.5, 6.0, 6.5, 7.0, 7.5, 8.0    Protein, Urine 30 (1+) (A) NEGATIVE, 10 (TRACE), 20 (TRACE) mg/dL    Glucose, Urine Normal Normal mg/dL    Blood, Urine 0.03 (TRACE) (A) NEGATIVE    Ketones, Urine 20 (1+) (A) NEGATIVE mg/dL    Bilirubin, Urine NEGATIVE NEGATIVE    Urobilinogen, Urine Normal Normal mg/dL    Nitrite, Urine NEGATIVE NEGATIVE    Leukocyte Esterase, Urine NEGATIVE NEGATIVE   Urinalysis Microscopic   Result Value Ref Range    WBC, Urine 1-5 1-5, NONE /HPF    RBC, Urine NONE NONE, 1-2, 3-5 /HPF    Mucus, Urine FEW Reference range not established. /LPF   Blood Culture    Specimen: Peripheral Venipuncture; Blood culture   Result Value Ref Range    Blood Culture Loaded on Instrument - Culture in progress    Blood Culture    Specimen: Peripheral Venipuncture; Blood culture   Result Value Ref Range    Blood Culture Loaded on Instrument - Culture in progress    Lactate   Result Value Ref Range    Lactate 2.0 0.4 - 2.0 mmol/L        Assessment/Plan   Kerry Arevalo is a 84 y.o. female with a past medical history of HTN, T2DM, OA, RA, and spinal stenosis c/b lumbar radiculopathy  presented on 6/7/2024 with slurred speech and altered mental status and was admitted for workup of confusion . Dermatology was consulted for rash.    Differential diagnoses: Drug Hypersensitivity Syndrome vs Urticarial Eruption. Urticarial drug eruption also considered     Impression: Drug Hypersensitivity Syndrome is a severe cutaneous adverse reaction to a medication typically taken within 2-6 weeks prior to the onset of the rash. This can manifest not only on the skin, but will frequently involve other organs. Given patient's new medication 6 weeks ago, as well as her elevated liver enzymes, it is reasonable to rule out this diagnosis. The time course of the appearance of the rash is suspicious for a more acute process, however, there are no drugs administered that are suspicious for this. Patient also received antihistamines with no  change in the character of the rash as well. Given the surface area and elevated liver enzymes, it is best to manage with systemic steroids.      Recommendations:   -Biopsy performed today. Consent given by patient and family. See procedure note   -START oral Prednisone 50mg daily   -Please draw HHV-6, HHV-7, CMV, EBV   -Avoid sedating antihistamines given altered mental status   -Continue to trend daily LFTs and CBC with differential     The patient was discussed with attending physician Dr. Daev Chávez. The assessment and plan was communicated to the care team.    Thank you for the consultation and for the opportunity to contribute to the care of this patient.      Pearl Garcia, DO   PGY2, Dermatology  Epic chat (preferred)  Team pager [phone]

## 2024-06-08 NOTE — ED PROCEDURE NOTE
Procedure  Critical Care    Performed by: Franco Sage MD  Authorized by: Franco Sage MD    Critical care provider statement:     Critical care time (minutes):  20    Critical care time was exclusive of:  Separately billable procedures and treating other patients    Critical care was necessary to treat or prevent imminent or life-threatening deterioration of the following conditions:  CNS failure or compromise    Critical care was time spent personally by me on the following activities:  Ordering and performing treatments and interventions, ordering and review of laboratory studies, ordering and review of radiographic studies, development of treatment plan with patient or surrogate, pulse oximetry, re-evaluation of patient's condition, evaluation of patient's response to treatment, examination of patient, interpretation of cardiac output measurements and obtaining history from patient or surrogate               Franco Sage MD  06/08/24 [id number]

## 2024-06-08 NOTE — PROGRESS NOTES
Handoff Note    I received Kerry Arevalo in signout from Dr. Arias.  Please see the previous note for all HPI, PE and MDM up to the time of signout at 11 pm.    In brief Kerry Arevalo is an 84 y.o. female presenting for   Chief Complaint   Patient presents with    slurred speech    Pre-hospital 12-lead STEMI     Briefly, 84-year-old female presents hemodynamically stable blood pressure 113/65 heart rate in the 80s SpO2 of 94% respiratory rate not elevated or depressed and afebrile the presents today for altered mental status with an ANO x 1 for the past 2 days, decrease in ADLs.  He was initially a STEMI alert which was negative and now being worked up for metabolic encephalopathy likely infectious with source unknown.    At the time of signout, the patient's disposition is pending UA and labs.  CBC shows a microcytic anemia with a hemoglobin of 9.5.  TSH, magnesium within normal limits.  VBG shows a lactate of 1.9.  Patient has a phosphorus of 2.1.  Replacement phosphate has been given.  BMP is within normal limits.  This Chest x-ray shows no acute cardiopulmonary process. CT head shows no intracranial hemorrhage, marked sinus wall thickening with opacification in the right maxillary, right ethmoid and sphenoid sinuses likely sinusitis.  COVID, influenza is negative.  Patient found to have a rash all over her bilateral lower extremities and upper extremities, chest.  Patient was given Benadryl and famotidine.  These medications did not help improve the swelling and erythema.  Patient also now much more agitated.  Patient given 2 mg of Haldol.  At this point, patient was already admitted to general medicine.  General medicine team has been made aware of the situation.    Throughout the ED stay, the patient was monitored and re-examined for any changes in stability or symptomatology.     ED Course:   ED Course as of 06/08/24 0745   Sat Jun 08, 2024   0105 The patient desatted to mid 80s with good waveform. She is  sleeping at this time and wakened easily, denying any shortness of breath or other symptoms. She was placed on nasal cannula 2L with improvement.  [LM]      ED Course User Index  [LM] Lindy Simental MD         Diagnoses as of 06/08/24 0745   Disorientation         Patient seen by and discussed with Dr. Simental.     Pt Disposition: Admit to Medicine    Procedures      Rosalinda Barboza MD  Emergency Medicine PGY-1  [facility]

## 2024-06-08 NOTE — ED PROVIDER NOTES
"HPI   No chief complaint on file.      83 y.o. female with a PMH of HTN, HLD, pre-DM2, spinal stenosis, OA, RA, presents to the emergency department for altered mental status from her baseline.  She is reportedly according to family awake alert and oriented x 4 pays her bills and ambulates without difficulty over the past several days according to EMS that she has been more confused oriented only to herself and less active.  There was some concern for slurred speech in the setting of her not having her teeth out but the family denied any focal neurologic deficit otherwise.  She herself states she has been \"feeling off for the past couple of days.\"  She is alert and oriented to herself confused about the year and location.  When asked directed questions about chest pain abdominal pain nausea vomiting diarrhea shortness of breath fevers or chills or leg swelling she denies them.  She is able to comply with my exam.      History provided by:  EMS personnel and patient                      No data recorded                   Patient History   Past Medical History:   Diagnosis Date    Body mass index (BMI) 25.0-25.9, adult 12/18/2019    Body mass index (BMI) of 25.0 to 25.9 in adult    Body mass index (BMI) 27.0-27.9, adult 04/26/2022    Body mass index (BMI) of 27.0 to 27.9 in adult    Body mass index (BMI) 28.0-28.9, adult 06/14/2022    Body mass index (BMI) of 28.0 to 28.9 in adult    Body mass index (BMI) 29.0-29.9, adult 08/16/2021    Body mass index (BMI) of 29.0 to 29.9 in adult    Body mass index (BMI)30.0-30.9, adult 01/27/2021    Body mass index (BMI) of 30.0 to 30.9 in adult    Disappearance and death of family member 10/31/2013    Bereavement, uncomplicated    Encounter for screening mammogram for malignant neoplasm of breast 06/27/2016    Encounter for screening mammogram for breast cancer    Macular cyst, hole, or pseudohole, right eye     Macular hole, right    Menopausal and female climacteric states " 11/04/2015    Menopausal symptoms    Other postherpetic nervous system involvement 07/24/2017    Postherpetic neuralgia    Other specified disorders of left ear 12/27/2016    Fullness in ear, left    Personal history of diseases of the blood and blood-forming organs and certain disorders involving the immune mechanism 02/19/2013    History of anemia    Personal history of diseases of the skin and subcutaneous tissue 05/12/2013    History of hair or hair follicle disorder    Personal history of other diseases of the musculoskeletal system and connective tissue     History of arthritis    Personal history of other diseases of the nervous system and sense organs     History of cataract    Personal history of other diseases of the respiratory system 03/04/2019    History of acute bronchitis    Personal history of other infectious and parasitic diseases 03/09/2017    History of herpes zoster    Personal history of other specified conditions 04/11/2019    History of weight loss    Unspecified asthma, uncomplicated ([facility]) 12/19/2022    Asthma     Past Surgical History:   Procedure Laterality Date    CATARACT EXTRACTION  10/14/2014    Cataract Surgery    COLONOSCOPY  02/19/2013    Complete Colonoscopy    HERNIA REPAIR  09/26/2016    Hernia Repair    OTHER SURGICAL HISTORY  05/12/2013    Repair Of Retinal Detachment With Macular Detachment    TOTAL ABDOMINAL HYSTERECTOMY W/ BILATERAL SALPINGOOPHORECTOMY  02/19/2013    Total Abdominal Hysterectomy With Removal Of Both Ovaries     Family History   Problem Relation Name Age of Onset    Heart failure Mother      Osteoporosis Mother      Other (cerebral hemorrhage) Father      Alzheimer's disease Sister      Heart attack Brother      Breast cancer Other       Social History     Tobacco Use    Smoking status: Never    Smokeless tobacco: Never   Substance Use Topics    Alcohol use: Not Currently    Drug use: Never       Physical Exam   ED Triage Vitals   Temp Pulse Resp BP   --  -- -- --      SpO2 Temp src Heart Rate Source Patient Position   -- -- -- --      BP Location FiO2 (%)     -- --       Physical Exam  Constitutional:       Appearance: She is not ill-appearing.   HENT:      Head: Normocephalic.      Mouth/Throat:      Mouth: Mucous membranes are dry.      Pharynx: Oropharynx is clear.   Eyes:      Extraocular Movements: Extraocular movements intact.      Pupils: Pupils are equal, round, and reactive to light.   Cardiovascular:      Rate and Rhythm: Normal rate and regular rhythm.      Pulses: Normal pulses.      Heart sounds: Normal heart sounds.   Pulmonary:      Effort: Pulmonary effort is normal.      Breath sounds: Normal breath sounds.   Abdominal:      General: Abdomen is flat.      Palpations: Abdomen is soft.      Tenderness: There is no abdominal tenderness. There is no guarding or rebound.   Musculoskeletal:         General: No tenderness.      Cervical back: Neck supple. No tenderness.      Right lower leg: No edema.      Left lower leg: No edema.   Skin:     General: Skin is warm and dry.      Capillary Refill: Capillary refill takes less than 2 seconds.      Coloration: Skin is not pale.   Neurological:      Mental Status: She is alert.      Comments: Awake alert and oriented x 1, GCS 15, cranial nerves II through XII are intact as tested but she is somewhat slow to comply with my exam.  No deficits in strength in the major muscle groups of the bilateral upper and bilateral lower extremities no pronator drift legs do not hit the bed at 5 seconds, denies sensory deficits in her bilateral upper and bilateral lower extremities no hyper or hyporeflexia no rigidity no clonus.         ED Course & MDM   Diagnoses as of 06/07/24 3927   Disorientation       Medical Decision Making  84-year-old female presents hemodynamically stable blood pressure 113/65 heart rate in the 80s SpO2 of 94% respiratory rate not elevated or depressed and afebrile.  She was initially called out as a  prehospital STEMI alert however her EKG here is normal sinus rhythm normal axis intervals within normal limits no ST-T wave abnormalities indicative of acute ischemia or electrolyte abnormalities negative for JN.  Her speech is clear but she is somewhat confused concern for encephalopathy at this point undetermined etiology.  No history of trauma given by patient or patient's family at the bedside however will obtain CT head to evaluate for intracranial pathology.  Will obtain infectious workup including urine and chest x-ray, basic laboratory studies including thyroid study.  Patient is currently on 2 L nasal cannula but being down titrated was placed on this by EMS and she has had no elevated work of breathing and is saturating well now on 1 L with a goal to wean to 0.  She has no focality to her neurologic exam indicative of a stroke or cerebral vascular accident.  Will give 500 cc of fluid as she received 500 prior to arrival via EMS.  She appears somewhat malnourished and deconditioned and given her confusion and weakness relative to her baseline although she has no weakness on my exam I have not attempted to ambulate the patient as her workup is ongoing she will likely need to be admitted.        Procedure  Procedures     Johnny Arias MD  Resident  06/07/24 8903       Franco Sage MD  06/08/24 6026

## 2024-06-08 NOTE — ED TRIAGE NOTES
"From home per EMS for c/o slurred speech x2 days after taking steroids (according to daughter). EMS 12-lead machine interpretation states \"STEMI\".  "

## 2024-06-08 NOTE — PROGRESS NOTES
Kerry Arevalo is a 84 y.o. female on day 0 of admission presenting with Disorientation.    Subjective   Notified this morning that Ms. Arevalo was acutely agitated and disoriented again. When seen she was sitting in bed without clothes and pulling at her lines. Her RN helped get pt situated and pt was largely redirectable. Pt provided limited history due to her confusion, so hx was supplemented by family member who was at bedside. Family member states that pt was at her cognitive baseline several hours ago but that her confusion has returned since initial presentation. He could not give a clearer timeline beyond this. He states that her rash appears to be getting worse and only started while she was here in the hospital.        Objective     Physical Exam  Constitutional:       General: She is not in acute distress.     Appearance: Normal appearance. She is not toxic-appearing.   HENT:      Head: Normocephalic and atraumatic.   Eyes:      Extraocular Movements: Extraocular movements intact.   Cardiovascular:      Rate and Rhythm: Regular rhythm. Tachycardia present.      Heart sounds: Normal heart sounds. No murmur heard.     No friction rub. No gallop.   Pulmonary:      Effort: Pulmonary effort is normal. No respiratory distress.      Breath sounds: Normal breath sounds. No wheezing, rhonchi or rales.   Abdominal:      General: Bowel sounds are normal. There is no distension.      Palpations: Abdomen is soft.      Tenderness: There is no abdominal tenderness. There is no rebound.   Musculoskeletal:         General: Normal range of motion.   Skin:     General: Skin is warm and dry.      Comments: Notable for generalized blanching, urticarial rash present all over body.  See pictures below   Neurological:      Mental Status: She is disoriented.      Comments: Only oriented to person when seen. Mumbled incoherently to certain questions.                                                Last Recorded Vitals  Blood pressure  157/122, pulse (!) 123, temperature 37.5 °C (99.5 °F), temperature source Oral, resp. rate 20, SpO2 (!) 89%.  Intake/Output last 3 Shifts:  No intake/output data recorded.    Relevant Results                Scheduled medications  azelastine, 2 spray, Each Nostril, BID  calcium carbonate-vitamin D3, 1 tablet, oral, BID  calcium citrate, 200 mg, oral, Daily  enoxaparin, 30 mg, subcutaneous, q24h ELYSE  losartan, 50 mg, oral, Daily  montelukast, 10 mg, oral, Daily  multivitamin with minerals iron-free, 1 tablet, oral, Daily  potassium phosphate, 15 mmol, intravenous, Once  [Held by provider] pravastatin, 40 mg, oral, Daily  sennosides-docusate sodium, 1 tablet, oral, Nightly  sulfaSALAzine, 1,000 mg, oral, Daily      Continuous medications     PRN medications  PRN medications: diclofenac sodium, polyethylene glycol    Results for orders placed or performed during the hospital encounter of 06/07/24 (from the past 24 hour(s))   CBC and Auto Differential   Result Value Ref Range    WBC 6.9 4.4 - 11.3 x10*3/uL    nRBC 0.0 0.0 - 0.0 /100 WBCs    RBC 3.27 (L) 4.00 - 5.20 x10*6/uL    Hemoglobin 9.5 (L) 12.0 - 16.0 g/dL    Hematocrit 26.3 (L) 36.0 - 46.0 %    MCV 80 80 - 100 fL    MCH 29.1 26.0 - 34.0 pg    MCHC 36.1 (H) 32.0 - 36.0 g/dL    RDW 12.8 11.5 - 14.5 %    Platelets 184 150 - 450 x10*3/uL    Immature Granulocytes %, Automated 0.7 0.0 - 0.9 %    Immature Granulocytes Absolute, Automated 0.05 0.00 - 0.50 x10*3/uL   Basic metabolic panel   Result Value Ref Range    Glucose 120 (H) 74 - 99 mg/dL    Sodium 140 136 - 145 mmol/L    Potassium 3.9 3.5 - 5.3 mmol/L    Chloride 109 (H) 98 - 107 mmol/L    Bicarbonate 20 (L) 21 - 32 mmol/L    Anion Gap 15 10 - 20 mmol/L    Urea Nitrogen 20 6 - 23 mg/dL    Creatinine 1.03 0.50 - 1.05 mg/dL    eGFR 54 (L) >60 mL/min/1.73m*2    Calcium 9.5 8.6 - 10.6 mg/dL   Phosphorus   Result Value Ref Range    Phosphorus 2.1 (L) 2.5 - 4.9 mg/dL   Magnesium   Result Value Ref Range    Magnesium  1.80 1.60 - 2.40 mg/dL   Blood Gas Venous Full Panel   Result Value Ref Range    POCT pH, Venous 7.37 7.33 - 7.43 pH    POCT pCO2, Venous 34 (L) 41 - 51 mm Hg    POCT pO2, Venous 31 (L) 35 - 45 mm Hg    POCT SO2, Venous 54 45 - 75 %    POCT Oxy Hemoglobin, Venous 53.1 45.0 - 75.0 %    POCT Hematocrit Calculated, Venous 34.0 (L) 36.0 - 46.0 %    POCT Sodium, Venous 138 136 - 145 mmol/L    POCT Potassium, Venous 3.3 (L) 3.5 - 5.3 mmol/L    POCT Chloride, Venous 107 98 - 107 mmol/L    POCT Ionized Calicum, Venous 1.42 (H) 1.10 - 1.33 mmol/L    POCT Glucose, Venous 112 (H) 74 - 99 mg/dL    POCT Lactate, Venous 1.9 0.4 - 2.0 mmol/L    POCT Base Excess, Venous -4.9 (L) -2.0 - 3.0 mmol/L    POCT HCO3 Calculated, Venous 19.7 (L) 22.0 - 26.0 mmol/L    POCT Hemoglobin, Venous 11.2 (L) 12.0 - 16.0 g/dL    POCT Anion Gap, Venous 15.0 10.0 - 25.0 mmol/L    Patient Temperature 37.0 degrees Celsius    FiO2 24 %   TSH with reflex to Free T4 if abnormal   Result Value Ref Range    Thyroid Stimulating Hormone 1.74 0.44 - 3.98 mIU/L   Manual Differential   Result Value Ref Range    Neutrophils %, Manual 78.0 40.0 - 80.0 %    Lymphocytes %, Manual 13.6 13.0 - 44.0 %    Monocytes %, Manual 5.9 2.0 - 10.0 %    Eosinophils %, Manual 0.0 0.0 - 6.0 %    Basophils %, Manual 0.0 0.0 - 2.0 %    Atypical Lymphocytes %, Manual 2.5 0.0 - 2.0 %    Seg Neutrophils Absolute, Manual 5.38 (H) 1.60 - 5.00 x10*3/uL    Lymphocytes Absolute, Manual 0.94 0.80 - 3.00 x10*3/uL    Monocytes Absolute, Manual 0.41 0.05 - 0.80 x10*3/uL    Eosinophils Absolute, Manual 0.00 0.00 - 0.40 x10*3/uL    Basophils Absolute, Manual 0.00 0.00 - 0.10 x10*3/uL    Atypical Lymphs Absolute, Manual 0.17 0.00 - 0.30 x10*3/uL    Total Cells Counted 118     RBC Morphology No significant RBC morphology present    Hepatic function panel   Result Value Ref Range    Albumin 3.2 (L) 3.4 - 5.0 g/dL    Bilirubin, Total 0.5 0.0 - 1.2 mg/dL    Bilirubin, Direct 0.0 0.0 - 0.3 mg/dL     Alkaline Phosphatase 179 (H) 33 - 136 U/L     (H) 7 - 45 U/L     (H) 9 - 39 U/L    Total Protein 6.1 (L) 6.4 - 8.2 g/dL   Troponin I, High Sensitivity   Result Value Ref Range    Troponin I, High Sensitivity 15 0 - 34 ng/L   Alcohol   Result Value Ref Range    Alcohol <10 <=10 mg/dL   Sars-CoV-2 PCR   Result Value Ref Range    Coronavirus 2019, PCR Not Detected Not Detected   Influenza A, and B PCR   Result Value Ref Range    Flu A Result Not Detected Not Detected    Flu B Result Not Detected Not Detected   Ammonia   Result Value Ref Range    Ammonia 30 16 - 53 umol/L   Type And Screen   Result Value Ref Range    ABO TYPE O     Rh TYPE POS     ANTIBODY SCREEN NEG        CT head wo IV contrast    Result Date: 6/8/2024  Interpreted By:  Albin Jonas and Dervishi Mario STUDY: CT HEAD WO IV CONTRAST;  6/7/2024 10:15 pm   INDICATION: Signs/Symptoms:lethargic ams.   COMPARISON: None.   ACCESSION NUMBER(S): [id number]   ORDERING CLINICIAN: EDUARDA GRAY   TECHNIQUE: Noncontrast axial CT scan of head was performed. Angled reformats in brain and bone windows were generated. The images were reviewed in bone, brain, blood and soft tissue windows.   FINDINGS: CSF Spaces: The ventricles, sulci and basal cisterns are concordant with the degree of mild parenchymal atrophy. There is no extraaxial fluid collection.   Parenchyma: There are confluent, patchy periventricular and subcortical white matter hypodensities which are nonspecific and likely sequela of chronic microvascular ischemic changes. The grey-white differentiation is intact. There is no mass effect or midline shift.  There is no intracranial hemorrhage.   Calvarium: The calvarium is unremarkable.   Paranasal sinuses and mastoids: There is extensive right maxillary sinus mucosal thickening with focal medial maxillary sinus wall defect, likely sequelae of prior maxillary antrostomy.. Left maxillary sinus is clear to the extent visualized. There  is near-complete opacification of the right sphenoid sinus. There is partial opacification of the right ethmoid air cells left sphenoid and ethmoid air cells are clear bilateral frontal sinuses are clear. Mastoid air cells are clear.       1. No acute intracranial hemorrhage or mass effect. 2. Paranasal sinus mucosal thickening most prominently affecting the right maxillary sinus. Postsurgical change related to right maxillary antrostomy. ENT follow-up is recommended.     I personally reviewed the images/study and I agree with the findings as stated by Resident Timothy Costa MD. This study was interpreted at [place], [place].   MACRO: None   Signed by: Albin Jonas 6/8/2024 12:17 AM Dictation workstation:   [id number]    XR chest 1 view    Result Date: 6/7/2024  STUDY: Chest Radiograph;  06/07/2024, 09:56 PM INDICATION: Lethargic. COMPARISON: None Available ACCESSION NUMBER(S): [id number] ORDERING CLINICIAN: EDUARDA GRAY TECHNIQUE:  Frontal chest was obtained at 21:57 hours. FINDINGS: The heart is normal size. The thoracic aorta is tortuous. No infiltrate or overt vascular congestion is present. Calcified lymph nodes are seen near the left hilum. There are no pleural effusions. There is no pneumothorax. There has been previous rotator cuff surgery at the right shoulder. Minimal arthritis is seen in the thoracic spine.    No detectable active cardiopulmonary disease. Remainder as above. Signed by Elio Rivera MD                Assessment/Plan   Principal Problem:    Disorientation    Kerry Arevalo is a 84 y.o. female with PMHx of HTN, T2DM, OA, RA, and spinal stenosis c/b lumbar radiculopathy who presented to the ED with slurred speech and confusion.  Initially suspected to be medication induced (recently started on medrol at urgent care for back pain and family suspects inappropriate usage by pt) vs less likely TIA (given recurrence of AMS and new rash).   Now also with concern for DRESS per Dermatology - given timeline of symptoms (pt had started sulfasalazine ~6 weeks prior to presentation) with urticarial rash, new transaminitis and altered mental status. Punch biopsy performed, but currently pursuing treatment with prednisone for DRESS pending results.     #AMS  #Urticaria  #C/f DRESS  ::Initially suspected to be medication induced (recently started on medrol at urgent care for back pain and family suspects inappropriate usage by pt) vs less likely TIA (recurrence of AMS and new rash).  :: No focal neuro deficits on evaluation in ED   :: CTH no acute findings, paranasal mucosal thickening   :: CXR without acute process  :: TSH wnl  :: S/p IV benadryl 50 mg IV and Famotidine 20 mg IV in the ED -> significant confusion after receiving benadryl, unclear if due to underlying process or due to benadryl sedative effect   :: Dermatology consulted for rash. Noted that given timeline of symptoms (pt had started sulfasalazine ~6 weeks prior to presentation), new transaminitis, rash and altered mental status, DRESS is a concern. Punch biopsy performed, but recommending treatment for DRESS pending results. No eosinophilia at this time.   :: UDS negative  - UA pending  - HOLD tizanidine, sulfasalazine  - Pred 50mg daily per Derm recs  - F/up punch biopsy     # Elevated Liver Enzymes  :: Hepatocellular pattern, with concern for DRESS (see above)  :: No RUQ abdominal pain  - RUQ US    # Anemia  :: Hgb 9.5 baseline ~12-13  :: No evidence of bleeding   :: Possibly secondary to sulfasalazine   - CTM         # HTN  :: BP soft on admission but after 1L LR pt was hypertensive SBP 150s when recheck in room   - C/w Losartan 50 mg daily      # Sinusitis  -C/w Azelastine spray   -C/w Montelukast 10 mg   - ENT follow up outpt     F : PRN  E: PRN  N: Regular      DVT prophylaxis: Lovenox subq     Code Status: DNR and No Intubation (confirmed on admission)   NOK: Kaitlin Hammonds (daughter)  [phone]           Carson Rios MD

## 2024-06-08 NOTE — HOSPITAL COURSE
Kerry Arevalo is a 84 y.o. female with PMHx of HTN, T2DM, OA, RA, and spinal stenosis c/b lumbar radiculopathy who presented to the ED with slurred speech and confusion.  Initially suspected to be medication induced (recently started on medrol at urgent care for back pain and family suspects inappropriate usage by pt) vs less likely TIA (given recurrence of AMS and new rash).  Initial labs s/f new transaminitis. Dermatology consulted, thought that presentation c/w DRESS given timeline of symptoms (pt had started sulfasalazine ~6 weeks prior to presentation) with urticarial rash, new transaminitis and altered mental status. Punch biopsy performed which showed lymphocytic infiltrate c/w DRESS. Pt initiated on prednisone and triamcinolone ointment BID for treatment per dermatology recs. Course c/b steroid-induced hyperglycemia and encephalopathy. Pt evaluated by PT/OT and skilled for SNF. Pt discharged to home with  referral for outpatient PT per family's wishes. Follow up appointment arranged with PCP, dermatology, rheumatology, and podiatry. Pt instructed to continue steroids and ointment until derm follow up 2 weeks from discharge. Order also placed for follow up labs to trend LFTs, derm to follow. Sufasalazine listed as allergy in chart.    Derm- Follow labs, pred taper  PCP- Restarting pravastatin  Podiatry- Foot cysts, chronic pain  Rheumatology-RA follow up, sulfasalazine discussion

## 2024-06-08 NOTE — PROCEDURES
Biopsy    Date/Time: 6/8/2024 4:20 PM    Performed by: Pearl Garcia DO  Authorized by: Sonia Chávez MD    Consent:     Consent obtained:  Verbal    Consent given by:  Patient (and family)    Risks, benefits, and alternatives were discussed: yes      Risks discussed:  Bleeding, infection, pain and poor cosmetic result  Universal protocol:     Procedure explained and questions answered to patient or proxy's satisfaction: yes      Relevant documents present and verified: yes      Site/side marked: yes      Immediately prior to procedure, a time out was called: yes      Patient identity confirmed:  Verbally with patient  Pre-procedure details:     Procedure prep: alcohol.  Sedation:     Sedation type:  None  Anesthesia:     Anesthesia method:  Local infiltration    Local anesthetic:  Lidocaine 1% WITH epi  Procedure specific details:      Punch Biopsy Procedure Note     Pre-operative Diagnosis: DRESS vs Urticaria      Post-operative Diagnosis: same     Locations: Specimen A: left upper back      Indications: diagnostic uncertainty     Procedure Details:  Patient informed of the risks (including bleeding and infection) and benefits of the procedure and verbal informed consent obtained.     The lesion and surrounding area was given a sterile prep using alcohol and draped in the usual sterile fashion. The skin was then stretched perpendicular to the skin tension lines and the lesion removed using the 4mm punch. The resulting ellipse was then closed. The wound was closed with 5-0 fast absorbing gut  using simple interrupted stitches. Vaseline ointment and a sterile dressing applied. The specimen was sent for pathologic examination. The patient tolerated the procedure well.     EBL: 0 ml      Plan:  1. For wound care, keep bandage on for 24 hours and to avoid bathing until then. Afterwards, continue to wash with soap and water daily, change bandage daily with Vaseline application x 7 days.  2. Suture removal  in 7-10 days.              Post-procedure details:     Procedure completion:  Tolerated well, no immediate complications

## 2024-06-08 NOTE — H&P
MEDICINE ADMISSION NOTE    History of Present Illness  Kerry Arevalo is a 84 y.o. female with PMHx of HTN, T2DM, OA, RA, and spinal stenosis c/b lumbar radiculopathy who presented to the ED with slurred speech and confusion.    Patient is accompanied by her daughters who report that this evening around 9 PM she was found in bed Egno over and slurring her words.  She was confused and thought her TV remote was a telephone.  They denied any facial drooping or weakness that they could observe.  They called 911, and during initial workup by EMS her ECG was ordered read as STEMI.  She was reportedly given 500 cc of IV fluids and was most likely loaded with aspirin.    Upon arrival to [facility] ED, ECG was repeated without concerning findings.  By the time family was able to see her in the emergency room, they report her slurring and confusion had completely resolved.  They state that she was recently started on a Medrol Dosepak after seeing urgent care for complaint of back and lower extremity pain.  Her family is worried that she was taking the medication appropriately.  Review of the Dosepak showed 6 missing tablets.  She was also recently started on tizanidine by her primary care doctor.    When patient arrived to emergency room, family also noted that she had new itchy rash over her trunk shoulders and bilateral lower extremities.  She has never had a rash like this and has no known allergies.    Patient denies any fever, chills, lightheadedness, shortness of breath, chest pain, abdominal pain, N/V/C/D, lower extremity pain/swelling.    ED Course:  BP 94/61   Pulse 97   Temp 37.5 °C (99.5 °F) (Oral)   Resp 19   SpO2 100%      Labs:  Results for orders placed or performed during the hospital encounter of 06/07/24 (from the past 24 hour(s))   CBC and Auto Differential   Result Value Ref Range    WBC 6.9 4.4 - 11.3 x10*3/uL    nRBC 0.0 0.0 - 0.0 /100 WBCs    RBC 3.27 (L) 4.00 - 5.20 x10*6/uL    Hemoglobin 9.5 (L)  12.0 - 16.0 g/dL    Hematocrit 26.3 (L) 36.0 - 46.0 %    MCV 80 80 - 100 fL    MCH 29.1 26.0 - 34.0 pg    MCHC 36.1 (H) 32.0 - 36.0 g/dL    RDW 12.8 11.5 - 14.5 %    Platelets 184 150 - 450 x10*3/uL    Immature Granulocytes %, Automated 0.7 0.0 - 0.9 %    Immature Granulocytes Absolute, Automated 0.05 0.00 - 0.50 x10*3/uL   Basic metabolic panel   Result Value Ref Range    Glucose 120 (H) 74 - 99 mg/dL    Sodium 140 136 - 145 mmol/L    Potassium 3.9 3.5 - 5.3 mmol/L    Chloride 109 (H) 98 - 107 mmol/L    Bicarbonate 20 (L) 21 - 32 mmol/L    Anion Gap 15 10 - 20 mmol/L    Urea Nitrogen 20 6 - 23 mg/dL    Creatinine 1.03 0.50 - 1.05 mg/dL    eGFR 54 (L) >60 mL/min/1.73m*2    Calcium 9.5 8.6 - 10.6 mg/dL   Phosphorus   Result Value Ref Range    Phosphorus 2.1 (L) 2.5 - 4.9 mg/dL   Magnesium   Result Value Ref Range    Magnesium 1.80 1.60 - 2.40 mg/dL   Blood Gas Venous Full Panel   Result Value Ref Range    POCT pH, Venous 7.37 7.33 - 7.43 pH    POCT pCO2, Venous 34 (L) 41 - 51 mm Hg    POCT pO2, Venous 31 (L) 35 - 45 mm Hg    POCT SO2, Venous 54 45 - 75 %    POCT Oxy Hemoglobin, Venous 53.1 45.0 - 75.0 %    POCT Hematocrit Calculated, Venous 34.0 (L) 36.0 - 46.0 %    POCT Sodium, Venous 138 136 - 145 mmol/L    POCT Potassium, Venous 3.3 (L) 3.5 - 5.3 mmol/L    POCT Chloride, Venous 107 98 - 107 mmol/L    POCT Ionized Calicum, Venous 1.42 (H) 1.10 - 1.33 mmol/L    POCT Glucose, Venous 112 (H) 74 - 99 mg/dL    POCT Lactate, Venous 1.9 0.4 - 2.0 mmol/L    POCT Base Excess, Venous -4.9 (L) -2.0 - 3.0 mmol/L    POCT HCO3 Calculated, Venous 19.7 (L) 22.0 - 26.0 mmol/L    POCT Hemoglobin, Venous 11.2 (L) 12.0 - 16.0 g/dL    POCT Anion Gap, Venous 15.0 10.0 - 25.0 mmol/L    Patient Temperature 37.0 degrees Celsius    FiO2 24 %   TSH with reflex to Free T4 if abnormal   Result Value Ref Range    Thyroid Stimulating Hormone 1.74 0.44 - 3.98 mIU/L   Manual Differential   Result Value Ref Range    Neutrophils %, Manual 78.0 40.0  - 80.0 %    Lymphocytes %, Manual 13.6 13.0 - 44.0 %    Monocytes %, Manual 5.9 2.0 - 10.0 %    Eosinophils %, Manual 0.0 0.0 - 6.0 %    Basophils %, Manual 0.0 0.0 - 2.0 %    Atypical Lymphocytes %, Manual 2.5 0.0 - 2.0 %    Seg Neutrophils Absolute, Manual 5.38 (H) 1.60 - 5.00 x10*3/uL    Lymphocytes Absolute, Manual 0.94 0.80 - 3.00 x10*3/uL    Monocytes Absolute, Manual 0.41 0.05 - 0.80 x10*3/uL    Eosinophils Absolute, Manual 0.00 0.00 - 0.40 x10*3/uL    Basophils Absolute, Manual 0.00 0.00 - 0.10 x10*3/uL    Atypical Lymphs Absolute, Manual 0.17 0.00 - 0.30 x10*3/uL    Total Cells Counted 118     RBC Morphology No significant RBC morphology present    Hepatic function panel   Result Value Ref Range    Albumin 3.2 (L) 3.4 - 5.0 g/dL    Bilirubin, Total 0.5 0.0 - 1.2 mg/dL    Bilirubin, Direct 0.0 0.0 - 0.3 mg/dL    Alkaline Phosphatase 179 (H) 33 - 136 U/L     (H) 7 - 45 U/L     (H) 9 - 39 U/L    Total Protein 6.1 (L) 6.4 - 8.2 g/dL   Troponin I, High Sensitivity   Result Value Ref Range    Troponin I, High Sensitivity 15 0 - 34 ng/L   Alcohol   Result Value Ref Range    Alcohol <10 <=10 mg/dL   Sars-CoV-2 PCR   Result Value Ref Range    Coronavirus 2019, PCR Not Detected Not Detected   Influenza A, and B PCR   Result Value Ref Range    Flu A Result Not Detected Not Detected    Flu B Result Not Detected Not Detected          Imaging:  CT head wo IV contrast    Result Date: 6/8/2024  Interpreted By:  Albin Jonas and Dervishi Mario STUDY: CT HEAD WO IV CONTRAST;  6/7/2024 10:15 pm   INDICATION: Signs/Symptoms:lethargic ams.   COMPARISON: None.   ACCESSION NUMBER(S): [id number]   ORDERING CLINICIAN: EDUARDA GRAY   TECHNIQUE: Noncontrast axial CT scan of head was performed. Angled reformats in brain and bone windows were generated. The images were reviewed in bone, brain, blood and soft tissue windows.   FINDINGS: CSF Spaces: The ventricles, sulci and basal cisterns are concordant with  the degree of mild parenchymal atrophy. There is no extraaxial fluid collection.   Parenchyma: There are confluent, patchy periventricular and subcortical white matter hypodensities which are nonspecific and likely sequela of chronic microvascular ischemic changes. The grey-white differentiation is intact. There is no mass effect or midline shift.  There is no intracranial hemorrhage.   Calvarium: The calvarium is unremarkable.   Paranasal sinuses and mastoids: There is extensive right maxillary sinus mucosal thickening with focal medial maxillary sinus wall defect, likely sequelae of prior maxillary antrostomy.. Left maxillary sinus is clear to the extent visualized. There is near-complete opacification of the right sphenoid sinus. There is partial opacification of the right ethmoid air cells left sphenoid and ethmoid air cells are clear bilateral frontal sinuses are clear. Mastoid air cells are clear.       1. No acute intracranial hemorrhage or mass effect. 2. Paranasal sinus mucosal thickening most prominently affecting the right maxillary sinus. Postsurgical change related to right maxillary antrostomy. ENT follow-up is recommended.     I personally reviewed the images/study and I agree with the findings as stated by Resident Timothy Costa MD. This study was interpreted at [place], [place].   MACRO: None   Signed by: Albin Jonas 6/8/2024 12:17 AM Dictation workstation:   [id number]    XR chest 1 view    Result Date: 6/7/2024  STUDY: Chest Radiograph;  06/07/2024, 09:56 PM INDICATION: Lethargic. COMPARISON: None Available ACCESSION NUMBER(S): [id number] ORDERING CLINICIAN: EDUARDA GRAY TECHNIQUE:  Frontal chest was obtained at 21:57 hours. FINDINGS: The heart is normal size. The thoracic aorta is tortuous. No infiltrate or overt vascular congestion is present. Calcified lymph nodes are seen near the left hilum. There are no pleural effusions. There is  no pneumothorax. There has been previous rotator cuff surgery at the right shoulder. Minimal arthritis is seen in the thoracic spine.    No detectable active cardiopulmonary disease. Remainder as above. Signed by Elio Rivera MD    EMS Interventions:   - 500 ml IVF (reported per ED note)   - Possible Aspirin load      ED Interventions:  - KPhos 15 mmol  -  ml  - Benadryl 50 mg IV  - Famotidine 20 mg IV      Past Medical History  Hypertension  Type 2 diabetes  Osteoarthritis  Rheumatoid arthritis  Spinal stenosis comfortable lumbar radiculopathy    Surgical History  Past Surgical History:   Procedure Laterality Date    CATARACT EXTRACTION  10/14/2014    Cataract Surgery    COLONOSCOPY  02/19/2013    Complete Colonoscopy    HERNIA REPAIR  09/26/2016    Hernia Repair    OTHER SURGICAL HISTORY  05/12/2013    Repair Of Retinal Detachment With Macular Detachment    TOTAL ABDOMINAL HYSTERECTOMY W/ BILATERAL SALPINGOOPHORECTOMY  02/19/2013    Total Abdominal Hysterectomy With Removal Of Both Ovaries       Social History  Tobacco: Quit greater than 30 years ago, unable to describe prior usage  EtOH: Denies  IDU: Denies    Family History  Family History   Problem Relation Name Age of Onset    Heart failure Mother      Osteoporosis Mother      Other (cerebral hemorrhage) Father      Alzheimer's disease Sister      Heart attack Brother      Breast cancer Other          Allergies  Duloxetine and Topiramate     Physical Exam   Constitutional: No acute distress, resting comfortably in bed, cooperative  HEENT: Normocephalic, atraumatic, PERRLA, EOMI, moist mucous membranes, no pharyngeal erythema  Cardiovascular: RRR, normal S1/S2, no murmurs noted  Pulmonary: Clear to auscultation b/l, no wheezes/crackles/rhonchi, no increased work of breathing, no supplemental oxygen  GI: Soft, non-tender, non-distended, normoactive bowel sounds  Lower extremities: Warm and well perfused, no lower extremity edema  Neuro: A&Ox3, able to move  all 4 extremities spontaneously, strength 5/5 in all extremities   Psych: Appropriate mood and affect     Medications  Scheduled medications  azelastine, 2 spray, Each Nostril, BID  calcium carbonate-vitamin D3, 1 tablet, oral, BID  calcium citrate, 200 mg, oral, Daily  enoxaparin, 30 mg, subcutaneous, q24h ELYSE  losartan, 50 mg, oral, Daily  montelukast, 10 mg, oral, Daily  multivitamin with minerals iron-free, 1 tablet, oral, Daily  potassium phosphate, 15 mmol, intravenous, Once  pravastatin, 40 mg, oral, Daily  sennosides-docusate sodium, 1 tablet, oral, Nightly  sulfaSALAzine, 1,000 mg, oral, Daily      Continuous medications     PRN medications        Assessment/Plan     Kerry Arevalo is a 84 y.o. female with PMHx of HTN, T2DM, OA, RA, and spinal stenosis c/b lumbar radiculopathy who presented to the ED with slurred speech and confusion.  There is a broad differential for this patient; however, given her symptoms of slurred speech and confusion both completely resolved without intervention suspect that this is related to medication side effect (steroids or tizanidine) versus TIA.  Other considerations include infection though without localizing symptoms, fever, or leukocytosis this is less likely.  She also presented to the ED with urticaria most likely secondary to medication.  Because this rash was not present before transfer from home to ED suspect that this is related to something given by EMS most likely aspirin load given initial concern for STEMI.    # Confusion / Slurred speech  :: Suspect TIA vs medication side-effect  :: No focal neuro deficits on evaluation in ED   :: CTH no acute findings, paranasal mucosal thickening   :: CXR without acute process  :: TSH wnl  - UA pending, bladder scan pending   - HOLD steroids and tizanidine     # Urticaria   :: Suspect secondary to a medication, possibly due to ASA load in route to hospital but ASA listed on prior meds and pt has no history of allergic  reactions  :: No respiratory symptoms   :: S/p IV benadryl 50 mg IV and Famotidine 20 mg IV in the ED   [ ] Continue to monitor for improvement     # Anemia  :: Hgb 9.5 baseline ~12-13  :: No evidence of bleeding   :: Possibly secondary to sulfasalazine   [ ] CTM   [ ] Type and screen and coags ordered     # Elevated Liver Enzymes  :: Hepatocellular pattern   :: No RUQ abdominal pain  [ ] Repeat HFP in AM   [ ] Consider RUQ US in AM     # HTN  :: BP soft on admission but after 1L LR pt was hypertensive SBP 150s when recheck in room   [ ] Losartan 50 mg daily     # Sinusitis  [ ] Azelastine spray   [ ] Montelukast 10 mg   [ ] ENT follow up       F : PRN  E: PRN  N: Regular     DVT prophylaxis: Lovenox subq    Code Status: DNR and No Intubation (confirmed on admission)   NOK: Kaitlin Hammonds (daughter) [phone]       Vasu Rae MD  Internal Medicine, PGY-2

## 2024-06-09 LAB
ALBUMIN SERPL BCP-MCNC: 3 G/DL (ref 3.4–5)
ALP SERPL-CCNC: 267 U/L (ref 33–136)
ALT SERPL W P-5'-P-CCNC: 212 U/L (ref 7–45)
ANION GAP SERPL CALC-SCNC: 16 MMOL/L (ref 10–20)
APTT PPP: 29 SECONDS (ref 27–38)
AST SERPL W P-5'-P-CCNC: 306 U/L (ref 9–39)
BASOPHILS # BLD AUTO: 0.02 X10*3/UL (ref 0–0.1)
BASOPHILS NFR BLD AUTO: 0.3 %
BILIRUB DIRECT SERPL-MCNC: 0.3 MG/DL (ref 0–0.3)
BILIRUB SERPL-MCNC: 0.6 MG/DL (ref 0–1.2)
BUN SERPL-MCNC: 16 MG/DL (ref 6–23)
BURR CELLS BLD QL SMEAR: NORMAL
CALCIUM SERPL-MCNC: 9.3 MG/DL (ref 8.6–10.6)
CHLORIDE SERPL-SCNC: 109 MMOL/L (ref 98–107)
CMV DNA SERPL NAA+PROBE-LOG IU: NORMAL {LOG_IU}/ML
CO2 SERPL-SCNC: 21 MMOL/L (ref 21–32)
CREAT SERPL-MCNC: 0.88 MG/DL (ref 0.5–1.05)
EGFRCR SERPLBLD CKD-EPI 2021: 65 ML/MIN/1.73M*2
EOSINOPHIL # BLD AUTO: 0 X10*3/UL (ref 0–0.4)
EOSINOPHIL NFR BLD AUTO: 0 %
ERYTHROCYTE [DISTWIDTH] IN BLOOD BY AUTOMATED COUNT: 13.4 % (ref 11.5–14.5)
GLUCOSE SERPL-MCNC: 307 MG/DL (ref 74–99)
HCT VFR BLD AUTO: 29.6 % (ref 36–46)
HGB BLD-MCNC: 9.9 G/DL (ref 12–16)
IMM GRANULOCYTES # BLD AUTO: 0.04 X10*3/UL (ref 0–0.5)
IMM GRANULOCYTES NFR BLD AUTO: 0.6 % (ref 0–0.9)
INR PPP: 1.5 (ref 0.9–1.1)
LABORATORY COMMENT REPORT: NOT DETECTED
LYMPHOCYTES # BLD AUTO: 2.12 X10*3/UL (ref 0.8–3)
LYMPHOCYTES NFR BLD AUTO: 32.9 %
MAGNESIUM SERPL-MCNC: 1.65 MG/DL (ref 1.6–2.4)
MCH RBC QN AUTO: 28.4 PG (ref 26–34)
MCHC RBC AUTO-ENTMCNC: 33.4 G/DL (ref 32–36)
MCV RBC AUTO: 85 FL (ref 80–100)
MONOCYTES # BLD AUTO: 0.07 X10*3/UL (ref 0.05–0.8)
MONOCYTES NFR BLD AUTO: 1.1 %
NEUTROPHILS # BLD AUTO: 4.19 X10*3/UL (ref 1.6–5.5)
NEUTROPHILS NFR BLD AUTO: 65.1 %
NRBC BLD-RTO: 0 /100 WBCS (ref 0–0)
PHOSPHATE SERPL-MCNC: 1.6 MG/DL (ref 2.5–4.9)
PLATELET # BLD AUTO: 194 X10*3/UL (ref 150–450)
POTASSIUM SERPL-SCNC: 3.2 MMOL/L (ref 3.5–5.3)
PROT SERPL-MCNC: 5.5 G/DL (ref 6.4–8.2)
PROTHROMBIN TIME: 16.7 SECONDS (ref 9.8–12.8)
RBC # BLD AUTO: 3.49 X10*6/UL (ref 4–5.2)
RBC MORPH BLD: NORMAL
SODIUM SERPL-SCNC: 143 MMOL/L (ref 136–145)
WBC # BLD AUTO: 6.4 X10*3/UL (ref 4.4–11.3)

## 2024-06-09 PROCEDURE — 1100000001 HC PRIVATE ROOM DAILY

## 2024-06-09 PROCEDURE — 85025 COMPLETE CBC W/AUTO DIFF WBC: CPT

## 2024-06-09 PROCEDURE — 2500000004 HC RX 250 GENERAL PHARMACY W/ HCPCS (ALT 636 FOR OP/ED)

## 2024-06-09 PROCEDURE — 2500000001 HC RX 250 WO HCPCS SELF ADMINISTERED DRUGS (ALT 637 FOR MEDICARE OP)

## 2024-06-09 PROCEDURE — 2500000001 HC RX 250 WO HCPCS SELF ADMINISTERED DRUGS (ALT 637 FOR MEDICARE OP): Performed by: STUDENT IN AN ORGANIZED HEALTH CARE EDUCATION/TRAINING PROGRAM

## 2024-06-09 PROCEDURE — 2500000004 HC RX 250 GENERAL PHARMACY W/ HCPCS (ALT 636 FOR OP/ED): Performed by: STUDENT IN AN ORGANIZED HEALTH CARE EDUCATION/TRAINING PROGRAM

## 2024-06-09 PROCEDURE — 99233 SBSQ HOSP IP/OBS HIGH 50: CPT | Performed by: STUDENT IN AN ORGANIZED HEALTH CARE EDUCATION/TRAINING PROGRAM

## 2024-06-09 PROCEDURE — 2500000004 HC RX 250 GENERAL PHARMACY W/ HCPCS (ALT 636 FOR OP/ED): Performed by: INTERNAL MEDICINE

## 2024-06-09 PROCEDURE — 36415 COLL VENOUS BLD VENIPUNCTURE: CPT

## 2024-06-09 PROCEDURE — 84075 ASSAY ALKALINE PHOSPHATASE: CPT

## 2024-06-09 PROCEDURE — 83735 ASSAY OF MAGNESIUM: CPT

## 2024-06-09 PROCEDURE — 85610 PROTHROMBIN TIME: CPT

## 2024-06-09 PROCEDURE — 84100 ASSAY OF PHOSPHORUS: CPT

## 2024-06-09 RX ORDER — ACETAMINOPHEN 325 MG/1
975 TABLET ORAL 3 TIMES DAILY PRN
Status: DISCONTINUED | OUTPATIENT
Start: 2024-06-09 | End: 2024-06-13 | Stop reason: HOSPADM

## 2024-06-09 RX ORDER — KETOROLAC TROMETHAMINE 15 MG/ML
15 INJECTION, SOLUTION INTRAMUSCULAR; INTRAVENOUS EVERY 6 HOURS PRN
Status: DISCONTINUED | OUTPATIENT
Start: 2024-06-09 | End: 2024-06-09

## 2024-06-09 RX ORDER — MAGNESIUM SULFATE HEPTAHYDRATE 40 MG/ML
4 INJECTION, SOLUTION INTRAVENOUS ONCE
Status: COMPLETED | OUTPATIENT
Start: 2024-06-09 | End: 2024-06-10

## 2024-06-09 RX ORDER — OLANZAPINE 5 MG/1
5 TABLET, ORALLY DISINTEGRATING ORAL NIGHTLY PRN
Status: DISCONTINUED | OUTPATIENT
Start: 2024-06-09 | End: 2024-06-09

## 2024-06-09 RX ORDER — ACETAMINOPHEN 10 MG/ML
1000 INJECTION, SOLUTION INTRAVENOUS ONCE
Status: DISCONTINUED | OUTPATIENT
Start: 2024-06-09 | End: 2024-06-09

## 2024-06-09 RX ORDER — OXYCODONE HYDROCHLORIDE 5 MG/1
5 TABLET ORAL EVERY 4 HOURS PRN
Status: DISCONTINUED | OUTPATIENT
Start: 2024-06-09 | End: 2024-06-09

## 2024-06-09 RX ORDER — HYDROMORPHONE HYDROCHLORIDE 1 MG/ML
0.3 INJECTION, SOLUTION INTRAMUSCULAR; INTRAVENOUS; SUBCUTANEOUS
Status: DISCONTINUED | OUTPATIENT
Start: 2024-06-09 | End: 2024-06-09

## 2024-06-09 RX ORDER — HYDROXYZINE HYDROCHLORIDE 25 MG/1
25 TABLET, FILM COATED ORAL ONCE
Status: COMPLETED | OUTPATIENT
Start: 2024-06-09 | End: 2024-06-09

## 2024-06-09 RX ORDER — OLANZAPINE 5 MG/1
5 TABLET, ORALLY DISINTEGRATING ORAL NIGHTLY
Status: DISCONTINUED | OUTPATIENT
Start: 2024-06-09 | End: 2024-06-09

## 2024-06-09 RX ADMIN — SODIUM CHLORIDE, POTASSIUM CHLORIDE, SODIUM LACTATE AND CALCIUM CHLORIDE 75 ML/HR: 600; 310; 30; 20 INJECTION, SOLUTION INTRAVENOUS at 08:54

## 2024-06-09 RX ADMIN — LOSARTAN POTASSIUM 50 MG: 25 TABLET, FILM COATED ORAL at 08:48

## 2024-06-09 RX ADMIN — MONTELUKAST 10 MG: 10 TABLET, FILM COATED ORAL at 08:48

## 2024-06-09 RX ADMIN — Medication 1 TABLET: at 21:01

## 2024-06-09 RX ADMIN — MAGNESIUM SULFATE HEPTAHYDRATE 4 G: 40 INJECTION, SOLUTION INTRAVENOUS at 22:25

## 2024-06-09 RX ADMIN — SENNOSIDES AND DOCUSATE SODIUM 1 TABLET: 50; 8.6 TABLET ORAL at 21:01

## 2024-06-09 RX ADMIN — HYDROXYZINE HYDROCHLORIDE 25 MG: 25 TABLET, FILM COATED ORAL at 12:43

## 2024-06-09 RX ADMIN — PREDNISONE 50 MG: 5 TABLET ORAL at 10:34

## 2024-06-09 RX ADMIN — LORATADINE 5 MG: 10 TABLET ORAL at 08:48

## 2024-06-09 RX ADMIN — AZELASTINE HYDROCHLORIDE 2 SPRAY: 137 SPRAY, METERED NASAL at 21:01

## 2024-06-09 RX ADMIN — Medication 1 TABLET: at 08:48

## 2024-06-09 RX ADMIN — ACETAMINOPHEN 650 MG: 650 SUPPOSITORY RECTAL at 06:26

## 2024-06-09 RX ADMIN — Medication 1 TABLET: at 08:51

## 2024-06-09 ASSESSMENT — PAIN SCALES - GENERAL
PAINLEVEL_OUTOF10: 0 - NO PAIN

## 2024-06-09 ASSESSMENT — PAIN - FUNCTIONAL ASSESSMENT
PAIN_FUNCTIONAL_ASSESSMENT: 0-10

## 2024-06-09 NOTE — PROGRESS NOTES
"Kerry Arevalo is a 84 y.o. female on day 1 of admission presenting with Disorientation.    Subjective   Patient reports she is feeling well. Denies acute complaints overinght. Per family, rash is significantly improved. She continues to spike fevers        Objective     Physical Exam  Constitutional:       General: She is not in acute distress.     Appearance: Normal appearance. She is not toxic-appearing.   HENT:      Head: Normocephalic and atraumatic.   Eyes:      Extraocular Movements: Extraocular movements intact.   Cardiovascular:      Rate and Rhythm: Regular rhythm. Tachycardia present.      Heart sounds: Normal heart sounds. No murmur heard.     No friction rub. No gallop.   Pulmonary:      Effort: Pulmonary effort is normal. No respiratory distress.      Breath sounds: Normal breath sounds. No wheezing, rhonchi or rales.   Abdominal:      General: Bowel sounds are normal. There is no distension.      Palpations: Abdomen is soft.      Tenderness: There is no abdominal tenderness. There is no rebound.   Musculoskeletal:         General: Normal range of motion.   Skin:     General: Skin is warm and dry.      Comments: Notable for generalized blanching, urticarial rash present all over body.  See pictures below   Neurological:      Mental Status: She is disoriented.      Comments: Only oriented to person when seen. Mumbled incoherently to certain questions.          Last Recorded Vitals  Blood pressure 143/68, pulse 104, temperature 36.4 °C (97.5 °F), resp. rate 16, height 1.397 m (4' 7\"), weight 55.3 kg (121 lb 14.6 oz), SpO2 98%.  Intake/Output last 3 Shifts:  No intake/output data recorded.    Relevant Results                Scheduled medications  azelastine, 2 spray, Each Nostril, BID  calcium carbonate-vitamin D3, 1 tablet, oral, BID  enoxaparin, 30 mg, subcutaneous, q24h ELYSE  loratadine, 5 mg, oral, Daily  losartan, 50 mg, oral, Daily  montelukast, 10 mg, oral, Daily  multivitamin with minerals " iron-free, 1 tablet, oral, Daily  potassium phosphate, 15 mmol, intravenous, Once  [Held by provider] pravastatin, 40 mg, oral, Daily  predniSONE, 50 mg, oral, Daily  sennosides-docusate sodium, 1 tablet, oral, Nightly  [Held by provider] sulfaSALAzine, 1,000 mg, oral, Daily      Continuous medications  lactated Ringer's, 75 mL/hr, Last Rate: 75 mL/hr (06/09/24 1010)      PRN medications  PRN medications: acetaminophen, acetaminophen, diclofenac sodium, polyethylene glycol    No results found for this or any previous visit (from the past 24 hour(s)).      US gallbladder    Result Date: 6/8/2024  Interpreted By:  Hossein Perry  and Letitia Villavicencio STUDY: US GALLBLADDER;  6/8/2024 4:25 pm   INDICATION: Signs/Symptoms:new transaminitis, rule out cholecystitis.   COMPARISON: None.   ACCESSION NUMBER(S): [id number]   ORDERING CLINICIAN: JASBIR OLIVER   TECHNIQUE: Multiple images of the right upper quadrant were obtained.   FINDINGS: LIVER: The liver measures 12.0 cm, within normal limits. There are a few scattered anechoic cyst without flow on color Doppler measuring 1.1 x 0.9 x 1.0 cm and 0.6 x 0.6 x 0.7 cm. A 0.4 cm echogenic focus with shadowing in the right hepatic lobe compatible with calcified granuloma.     GALLBLADDER: The gallbladder contains echogenic sludge within the lumen. No evidence of gallstones, wall thickening or surrounding fluid. The gallbladder wall thickness is 0.2 cm. Sonographic Melgoza's sign is negative.     BILE DUCTS: No evidence of intra or extrahepatic biliary dilatation is identified; the common bile duct measures 0.6 cm.   PANCREAS: The pancreas is poorly visualized due to overlying bowel gas.   RIGHT KIDNEY: The right kidney measures 8.6 cm in length. The renal cortical echogenicity and thickness are within normal limit.  No hydronephrosis or renal calculi are seen.       1. Gallbladder sludge without sonographic evidence of acute cholecystitis. 2. Few small simple cysts and calcified  granuloma within the liver.   I personally reviewed the images/study and I agree with the findings as stated by resident physician Dr. Yevgeniy Dong.   MACRO: None     Signed by: Hossein Perry 6/8/2024 4:50 PM Dictation workstation:   [id number]    CT head wo IV contrast    Result Date: 6/8/2024  Interpreted By:  Albin Jonas and Dervishi Mario STUDY: CT HEAD WO IV CONTRAST;  6/7/2024 10:15 pm   INDICATION: Signs/Symptoms:lethargic ams.   COMPARISON: None.   ACCESSION NUMBER(S): [id number]   ORDERING CLINICIAN: EDUARDA GRAY   TECHNIQUE: Noncontrast axial CT scan of head was performed. Angled reformats in brain and bone windows were generated. The images were reviewed in bone, brain, blood and soft tissue windows.   FINDINGS: CSF Spaces: The ventricles, sulci and basal cisterns are concordant with the degree of mild parenchymal atrophy. There is no extraaxial fluid collection.   Parenchyma: There are confluent, patchy periventricular and subcortical white matter hypodensities which are nonspecific and likely sequela of chronic microvascular ischemic changes. The grey-white differentiation is intact. There is no mass effect or midline shift.  There is no intracranial hemorrhage.   Calvarium: The calvarium is unremarkable.   Paranasal sinuses and mastoids: There is extensive right maxillary sinus mucosal thickening with focal medial maxillary sinus wall defect, likely sequelae of prior maxillary antrostomy.. Left maxillary sinus is clear to the extent visualized. There is near-complete opacification of the right sphenoid sinus. There is partial opacification of the right ethmoid air cells left sphenoid and ethmoid air cells are clear bilateral frontal sinuses are clear. Mastoid air cells are clear.       1. No acute intracranial hemorrhage or mass effect. 2. Paranasal sinus mucosal thickening most prominently affecting the right maxillary sinus. Postsurgical change related to right maxillary antrostomy.  ENT follow-up is recommended.     I personally reviewed the images/study and I agree with the findings as stated by Resident Timothy Costa MD. This study was interpreted at [facility], [place], [place].   MACRO: None   Signed by: Albin Jonas 6/8/2024 12:17 AM Dictation workstation:   [id number]    XR chest 1 view    Result Date: 6/7/2024  STUDY: Chest Radiograph;  06/07/2024, 09:56 PM INDICATION: Lethargic. COMPARISON: None Available ACCESSION NUMBER(S): [id number] ORDERING CLINICIAN: EDUARDA GRAY TECHNIQUE:  Frontal chest was obtained at 21:57 hours. FINDINGS: The heart is normal size. The thoracic aorta is tortuous. No infiltrate or overt vascular congestion is present. Calcified lymph nodes are seen near the left hilum. There are no pleural effusions. There is no pneumothorax. There has been previous rotator cuff surgery at the right shoulder. Minimal arthritis is seen in the thoracic spine.    No detectable active cardiopulmonary disease. Remainder as above. Signed by Elio Rivera MD                Assessment/Plan   Principal Problem:    Disorientation    Kerry Arevalo is a 84 y.o. female with PMHx of HTN, T2DM, OA, RA, and spinal stenosis c/b lumbar radiculopathy who presented to the ED with slurred speech and confusion.  Initially suspected to be medication induced (recently started on medrol at urgent care for back pain and family suspects inappropriate usage by pt) vs less likely TIA (given recurrence of AMS and new rash).  Now also with concern for DRESS per Dermatology - given timeline of symptoms (pt had started sulfasalazine ~6 weeks prior to presentation) with urticarial rash, new transaminitis and altered mental status. Punch biopsy performed, but currently pursuing treatment with prednisone for DRESS pending results.     Updates 6/9/24    - Cont steroids    #AMS  #Urticaria  #C/f DRESS  ::Initially suspected to be medication induced (recently started  on medrol at urgent care for back pain and family suspects inappropriate usage by pt) vs less likely TIA (recurrence of AMS and new rash).  :: No focal neuro deficits on evaluation in ED   :: CTH no acute findings, paranasal mucosal thickening   :: CXR without acute process  :: TSH wnl  :: S/p IV benadryl 50 mg IV and Famotidine 20 mg IV in the ED -> significant confusion after receiving benadryl, unclear if due to underlying process or due to benadryl sedative effect   :: Dermatology consulted for rash. Noted that given timeline of symptoms (pt had started sulfasalazine ~6 weeks prior to presentation), new transaminitis, rash and altered mental status, DRESS is a concern. Punch biopsy performed, but recommending treatment for DRESS pending results. No eosinophilia at this time.   :: UDS negative  - UA pending  - HOLD tizanidine, sulfasalazine  - Pred 50mg daily per Derm recs  - F/up punch biopsy     # Elevated Liver Enzymes  :: Hepatocellular pattern, with concern for DRESS (see above)  :: No RUQ abdominal pain  - RUQ US    # Anemia  :: Hgb 9.5 baseline ~12-13  :: No evidence of bleeding   :: Possibly secondary to sulfasalazine   - CTM         # HTN  :: BP soft on admission but after 1L LR pt was hypertensive SBP 150s when recheck in room   - C/w Losartan 50 mg daily      # Sinusitis  -C/w Azelastine spray   -C/w Montelukast 10 mg   - ENT follow up outpt     F : PRN  E: PRN  N: Regular      DVT prophylaxis: Lovenox subq     Code Status: DNR and No Intubation (confirmed on admission)   NOK: Kaitlin Hammonds (daughter) [phone]           Noel Francois MD

## 2024-06-10 ENCOUNTER — APPOINTMENT (OUTPATIENT)
Dept: RADIOLOGY | Facility: HOSPITAL | Age: 84
End: 2024-06-10
Payer: MEDICARE

## 2024-06-10 ENCOUNTER — APPOINTMENT (OUTPATIENT)
Dept: CARDIOLOGY | Facility: HOSPITAL | Age: 84
End: 2024-06-10
Payer: MEDICARE

## 2024-06-10 LAB
ALBUMIN SERPL BCP-MCNC: 2.9 G/DL (ref 3.4–5)
ALP SERPL-CCNC: 230 U/L (ref 33–136)
ALT SERPL W P-5'-P-CCNC: 176 U/L (ref 7–45)
ANION GAP SERPL CALC-SCNC: 12 MMOL/L (ref 10–20)
AST SERPL W P-5'-P-CCNC: 105 U/L (ref 9–39)
BASOPHILS # BLD MANUAL: 0 X10*3/UL (ref 0–0.1)
BASOPHILS NFR BLD MANUAL: 0 %
BILIRUB DIRECT SERPL-MCNC: 0.2 MG/DL (ref 0–0.3)
BILIRUB SERPL-MCNC: 0.6 MG/DL (ref 0–1.2)
BUN SERPL-MCNC: 12 MG/DL (ref 6–23)
BURR CELLS BLD QL SMEAR: ABNORMAL
CALCIUM SERPL-MCNC: 9.3 MG/DL (ref 8.6–10.6)
CHLORIDE SERPL-SCNC: 110 MMOL/L (ref 98–107)
CMV IGG AVIDITY SERPL IA-RTO: REACTIVE %
CO2 SERPL-SCNC: 27 MMOL/L (ref 21–32)
CREAT SERPL-MCNC: 0.59 MG/DL (ref 0.5–1.05)
EBV EA IGG SER QL: NEGATIVE
EBV NA AB SER QL: POSITIVE
EBV VCA IGG SER IA-ACNC: POSITIVE
EBV VCA IGM SER IA-ACNC: NEGATIVE
EGFRCR SERPLBLD CKD-EPI 2021: 90 ML/MIN/1.73M*2
EOSINOPHIL # BLD MANUAL: 0 X10*3/UL (ref 0–0.4)
EOSINOPHIL NFR BLD MANUAL: 0 %
ERYTHROCYTE [DISTWIDTH] IN BLOOD BY AUTOMATED COUNT: 13.5 % (ref 11.5–14.5)
GLUCOSE BLD MANUAL STRIP-MCNC: 120 MG/DL (ref 74–99)
GLUCOSE BLD MANUAL STRIP-MCNC: 129 MG/DL (ref 74–99)
GLUCOSE BLD MANUAL STRIP-MCNC: 194 MG/DL (ref 74–99)
GLUCOSE SERPL-MCNC: 113 MG/DL (ref 74–99)
HCT VFR BLD AUTO: 29.1 % (ref 36–46)
HGB BLD-MCNC: 9.9 G/DL (ref 12–16)
IMM GRANULOCYTES # BLD AUTO: 0.11 X10*3/UL (ref 0–0.5)
IMM GRANULOCYTES NFR BLD AUTO: 1.1 % (ref 0–0.9)
LYMPHOCYTES # BLD MANUAL: 1.25 X10*3/UL (ref 0.8–3)
LYMPHOCYTES NFR BLD MANUAL: 12.6 %
MAGNESIUM SERPL-MCNC: 2.52 MG/DL (ref 1.6–2.4)
MCH RBC QN AUTO: 28.4 PG (ref 26–34)
MCHC RBC AUTO-ENTMCNC: 34 G/DL (ref 32–36)
MCV RBC AUTO: 84 FL (ref 80–100)
MONOCYTES # BLD MANUAL: 0 X10*3/UL (ref 0.05–0.8)
MONOCYTES NFR BLD MANUAL: 0 %
NEUTROPHILS # BLD MANUAL: 7.9 X10*3/UL (ref 1.6–5.5)
NEUTS BAND # BLD MANUAL: 0.42 X10*3/UL (ref 0–0.5)
NEUTS BAND NFR BLD MANUAL: 4.2 %
NEUTS SEG # BLD MANUAL: 7.48 X10*3/UL (ref 1.6–5)
NEUTS SEG NFR BLD MANUAL: 75.6 %
NRBC BLD-RTO: 0 /100 WBCS (ref 0–0)
OVALOCYTES BLD QL SMEAR: ABNORMAL
PHOSPHATE SERPL-MCNC: 3.4 MG/DL (ref 2.5–4.9)
PLATELET # BLD AUTO: 215 X10*3/UL (ref 150–450)
POTASSIUM SERPL-SCNC: 3.3 MMOL/L (ref 3.5–5.3)
PROT SERPL-MCNC: 5.1 G/DL (ref 6.4–8.2)
RBC # BLD AUTO: 3.48 X10*6/UL (ref 4–5.2)
RBC MORPH BLD: ABNORMAL
SODIUM SERPL-SCNC: 146 MMOL/L (ref 136–145)
TOTAL CELLS COUNTED BLD: 119
VARIANT LYMPHS # BLD MANUAL: 0.75 X10*3/UL (ref 0–0.3)
VARIANT LYMPHS NFR BLD: 7.6 %
WBC # BLD AUTO: 9.9 X10*3/UL (ref 4.4–11.3)

## 2024-06-10 PROCEDURE — 99232 SBSQ HOSP IP/OBS MODERATE 35: CPT | Performed by: STUDENT IN AN ORGANIZED HEALTH CARE EDUCATION/TRAINING PROGRAM

## 2024-06-10 PROCEDURE — 86644 CMV ANTIBODY: CPT

## 2024-06-10 PROCEDURE — 97535 SELF CARE MNGMENT TRAINING: CPT | Mod: GO

## 2024-06-10 PROCEDURE — 93005 ELECTROCARDIOGRAM TRACING: CPT

## 2024-06-10 PROCEDURE — 93970 EXTREMITY STUDY: CPT | Performed by: RADIOLOGY

## 2024-06-10 PROCEDURE — 97162 PT EVAL MOD COMPLEX 30 MIN: CPT | Mod: GP | Performed by: PHYSICAL THERAPIST

## 2024-06-10 PROCEDURE — 76705 ECHO EXAM OF ABDOMEN: CPT

## 2024-06-10 PROCEDURE — 2500000002 HC RX 250 W HCPCS SELF ADMINISTERED DRUGS (ALT 637 FOR MEDICARE OP, ALT 636 FOR OP/ED)

## 2024-06-10 PROCEDURE — 86645 CMV ANTIBODY IGM: CPT

## 2024-06-10 PROCEDURE — 86790 VIRUS ANTIBODY NOS: CPT

## 2024-06-10 PROCEDURE — 82248 BILIRUBIN DIRECT: CPT

## 2024-06-10 PROCEDURE — 2500000004 HC RX 250 GENERAL PHARMACY W/ HCPCS (ALT 636 FOR OP/ED)

## 2024-06-10 PROCEDURE — 83735 ASSAY OF MAGNESIUM: CPT

## 2024-06-10 PROCEDURE — 2500000004 HC RX 250 GENERAL PHARMACY W/ HCPCS (ALT 636 FOR OP/ED): Performed by: STUDENT IN AN ORGANIZED HEALTH CARE EDUCATION/TRAINING PROGRAM

## 2024-06-10 PROCEDURE — 97165 OT EVAL LOW COMPLEX 30 MIN: CPT | Mod: GO

## 2024-06-10 PROCEDURE — 82306 VITAMIN D 25 HYDROXY: CPT | Performed by: STUDENT IN AN ORGANIZED HEALTH CARE EDUCATION/TRAINING PROGRAM

## 2024-06-10 PROCEDURE — 2500000001 HC RX 250 WO HCPCS SELF ADMINISTERED DRUGS (ALT 637 FOR MEDICARE OP)

## 2024-06-10 PROCEDURE — 2500000004 HC RX 250 GENERAL PHARMACY W/ HCPCS (ALT 636 FOR OP/ED): Performed by: INTERNAL MEDICINE

## 2024-06-10 PROCEDURE — 93010 ELECTROCARDIOGRAM REPORT: CPT | Performed by: INTERNAL MEDICINE

## 2024-06-10 PROCEDURE — 82947 ASSAY GLUCOSE BLOOD QUANT: CPT | Mod: 91

## 2024-06-10 PROCEDURE — 93970 EXTREMITY STUDY: CPT

## 2024-06-10 PROCEDURE — 2500000005 HC RX 250 GENERAL PHARMACY W/O HCPCS: Performed by: STUDENT IN AN ORGANIZED HEALTH CARE EDUCATION/TRAINING PROGRAM

## 2024-06-10 PROCEDURE — 2500000001 HC RX 250 WO HCPCS SELF ADMINISTERED DRUGS (ALT 637 FOR MEDICARE OP): Performed by: STUDENT IN AN ORGANIZED HEALTH CARE EDUCATION/TRAINING PROGRAM

## 2024-06-10 PROCEDURE — 87799 DETECT AGENT NOS DNA QUANT: CPT

## 2024-06-10 PROCEDURE — 85027 COMPLETE CBC AUTOMATED: CPT

## 2024-06-10 PROCEDURE — 86663 EPSTEIN-BARR ANTIBODY: CPT

## 2024-06-10 PROCEDURE — 76705 ECHO EXAM OF ABDOMEN: CPT | Performed by: RADIOLOGY

## 2024-06-10 PROCEDURE — 87533 HHV-6 DNA QUANT: CPT

## 2024-06-10 PROCEDURE — 1100000001 HC PRIVATE ROOM DAILY

## 2024-06-10 PROCEDURE — 97530 THERAPEUTIC ACTIVITIES: CPT | Mod: GP | Performed by: PHYSICAL THERAPIST

## 2024-06-10 PROCEDURE — 85007 BL SMEAR W/DIFF WBC COUNT: CPT

## 2024-06-10 PROCEDURE — 36415 COLL VENOUS BLD VENIPUNCTURE: CPT

## 2024-06-10 PROCEDURE — 80048 BASIC METABOLIC PNL TOTAL CA: CPT

## 2024-06-10 PROCEDURE — 97530 THERAPEUTIC ACTIVITIES: CPT | Mod: GO

## 2024-06-10 PROCEDURE — 84100 ASSAY OF PHOSPHORUS: CPT

## 2024-06-10 PROCEDURE — 99232 SBSQ HOSP IP/OBS MODERATE 35: CPT

## 2024-06-10 RX ORDER — POTASSIUM CHLORIDE 1.5 G/1.58G
40 POWDER, FOR SOLUTION ORAL ONCE
Status: COMPLETED | OUTPATIENT
Start: 2024-06-10 | End: 2024-06-10

## 2024-06-10 RX ORDER — TRIAMCINOLONE ACETONIDE 1 MG/G
OINTMENT TOPICAL 2 TIMES DAILY
Status: DISCONTINUED | OUTPATIENT
Start: 2024-06-10 | End: 2024-06-13 | Stop reason: HOSPADM

## 2024-06-10 RX ORDER — LORATADINE 10 MG/1
10 TABLET ORAL DAILY
Status: DISCONTINUED | OUTPATIENT
Start: 2024-06-10 | End: 2024-06-13 | Stop reason: HOSPADM

## 2024-06-10 RX ORDER — OLANZAPINE 10 MG/2ML
2.5 INJECTION, POWDER, FOR SOLUTION INTRAMUSCULAR ONCE
Status: COMPLETED | OUTPATIENT
Start: 2024-06-10 | End: 2024-06-10

## 2024-06-10 RX ORDER — DEXTROSE 50 % IN WATER (D50W) INTRAVENOUS SYRINGE
25
Status: DISCONTINUED | OUTPATIENT
Start: 2024-06-10 | End: 2024-06-13 | Stop reason: HOSPADM

## 2024-06-10 RX ORDER — HYDROXYZINE HYDROCHLORIDE 25 MG/1
25 TABLET, FILM COATED ORAL ONCE
Status: COMPLETED | OUTPATIENT
Start: 2024-06-10 | End: 2024-06-10

## 2024-06-10 RX ORDER — INSULIN LISPRO 100 [IU]/ML
0-10 INJECTION, SOLUTION INTRAVENOUS; SUBCUTANEOUS
Status: DISCONTINUED | OUTPATIENT
Start: 2024-06-10 | End: 2024-06-13 | Stop reason: HOSPADM

## 2024-06-10 RX ORDER — QUETIAPINE FUMARATE 25 MG/1
25 TABLET, FILM COATED ORAL ONCE
Status: DISCONTINUED | OUTPATIENT
Start: 2024-06-10 | End: 2024-06-10

## 2024-06-10 RX ADMIN — LOSARTAN POTASSIUM 50 MG: 25 TABLET, FILM COATED ORAL at 08:31

## 2024-06-10 RX ADMIN — AZELASTINE HYDROCHLORIDE 2 SPRAY: 137 SPRAY, METERED NASAL at 08:30

## 2024-06-10 RX ADMIN — POTASSIUM PHOSPHATE, MONOBASIC POTASSIUM PHOSPHATE, DIBASIC 30 MMOL: 224; 236 INJECTION, SOLUTION, CONCENTRATE INTRAVENOUS at 02:38

## 2024-06-10 RX ADMIN — LORATADINE 10 MG: 10 TABLET ORAL at 18:45

## 2024-06-10 RX ADMIN — INSULIN LISPRO 2 UNITS: 100 INJECTION, SOLUTION INTRAVENOUS; SUBCUTANEOUS at 14:03

## 2024-06-10 RX ADMIN — TRIAMCINOLONE ACETONIDE: 1 OINTMENT TOPICAL at 10:14

## 2024-06-10 RX ADMIN — SODIUM CHLORIDE, POTASSIUM CHLORIDE, SODIUM LACTATE AND CALCIUM CHLORIDE 500 ML: 600; 310; 30; 20 INJECTION, SOLUTION INTRAVENOUS at 10:14

## 2024-06-10 RX ADMIN — OLANZAPINE 2.5 MG: 10 INJECTION, POWDER, FOR SOLUTION INTRAMUSCULAR at 21:58

## 2024-06-10 RX ADMIN — MONTELUKAST 10 MG: 10 TABLET, FILM COATED ORAL at 08:31

## 2024-06-10 RX ADMIN — ENOXAPARIN SODIUM 30 MG: 30 INJECTION SUBCUTANEOUS at 08:30

## 2024-06-10 RX ADMIN — LORATADINE 5 MG: 10 TABLET ORAL at 08:31

## 2024-06-10 RX ADMIN — Medication 1 TABLET: at 08:31

## 2024-06-10 RX ADMIN — POTASSIUM CHLORIDE 40 MEQ: 1.5 POWDER, FOR SOLUTION ORAL at 12:07

## 2024-06-10 RX ADMIN — HYDROXYZINE HYDROCHLORIDE 25 MG: 25 TABLET, FILM COATED ORAL at 08:31

## 2024-06-10 RX ADMIN — PREDNISONE 50 MG: 5 TABLET ORAL at 08:31

## 2024-06-10 RX ADMIN — Medication 1 TABLET: at 08:30

## 2024-06-10 ASSESSMENT — COGNITIVE AND FUNCTIONAL STATUS - GENERAL
CLIMB 3 TO 5 STEPS WITH RAILING: TOTAL
TOILETING: A LOT
STANDING UP FROM CHAIR USING ARMS: A LITTLE
EATING MEALS: A LITTLE
MOBILITY SCORE: 15
PERSONAL GROOMING: A LITTLE
WALKING IN HOSPITAL ROOM: A LOT
DAILY ACTIVITIY SCORE: 16
CLIMB 3 TO 5 STEPS WITH RAILING: TOTAL
DAILY ACTIVITIY SCORE: 16
DRESSING REGULAR LOWER BODY CLOTHING: A LITTLE
MOVING FROM LYING ON BACK TO SITTING ON SIDE OF FLAT BED WITH BEDRAILS: A LITTLE
MOBILITY SCORE: 14
WALKING IN HOSPITAL ROOM: A LOT
EATING MEALS: A LITTLE
TURNING FROM BACK TO SIDE WHILE IN FLAT BAD: A LITTLE
MOVING FROM LYING ON BACK TO SITTING ON SIDE OF FLAT BED WITH BEDRAILS: A LITTLE
MOVING TO AND FROM BED TO CHAIR: A LITTLE
STANDING UP FROM CHAIR USING ARMS: A LOT
TURNING FROM BACK TO SIDE WHILE IN FLAT BAD: A LITTLE
HELP NEEDED FOR BATHING: A LOT
TOILETING: A LOT
DRESSING REGULAR UPPER BODY CLOTHING: A LITTLE
MOVING TO AND FROM BED TO CHAIR: A LITTLE
DRESSING REGULAR UPPER BODY CLOTHING: A LITTLE
DRESSING REGULAR LOWER BODY CLOTHING: A LITTLE
PERSONAL GROOMING: A LITTLE
HELP NEEDED FOR BATHING: A LOT

## 2024-06-10 ASSESSMENT — ACTIVITIES OF DAILY LIVING (ADL)
HOME_MANAGEMENT_TIME_ENTRY: 18
ADL_ASSISTANCE: INDEPENDENT
BATHING_ASSISTANCE: MODERATE
ADL_ASSISTANCE: INDEPENDENT

## 2024-06-10 ASSESSMENT — PAIN SCALES - GENERAL
PAINLEVEL_OUTOF10: 0 - NO PAIN
PAINLEVEL_OUTOF10: 6
PAINLEVEL_OUTOF10: 6

## 2024-06-10 ASSESSMENT — PAIN - FUNCTIONAL ASSESSMENT
PAIN_FUNCTIONAL_ASSESSMENT: 0-10
PAIN_FUNCTIONAL_ASSESSMENT: 0-10

## 2024-06-10 NOTE — PROGRESS NOTES
"DERMATOLOGY CONSULT PROGRESS NOTE  Name: Kerry Arevalo  MRN: [id number]  : 1940    Subjective    Patient states she is feeling better with use of the oral steroids. Family agrees her mentation is back at baseline. Patient does complain of more itching.      Objective    Vitals:    24 0845 24 0904 24 1415 24   BP: 122/56 122/56 143/68 149/75   BP Location:  Left arm     Patient Position:  Lying     Pulse: 99 99 104 102   Resp:  16 16   Temp: 37 °C (98.6 °F) 37 °C (98.6 °F) 36.4 °C (97.5 °F)    TempSrc:  Temporal     SpO2: 96% 96% 98% 100%   Weight:  55.3 kg (121 lb 14.6 oz)     Height:  1.397 m (4' 7\")        Exam    Physical Exam   GEN: no acute distress  NEURO: moving all extremities   EYES: conjunctiva and eyelids normal. No conjunctival injection or erosions appreciated  ENT:   - Lips: normal  - Teeth/gums: normal  - Oropharynx: normal tongue and mucosa  NECK: normal and symmetric.   CV: no varicosities, warmth or tenderness of extremities.  GI: Flat abdomen. Non-tender. No hepatosplenomegaly.  LYMPH: no LAD   EXTREMITIES: no distal digital clubbing, cyanosis, petechiae   SKIN: A full body skin exam including scalp, face, eyes, ears, neck, trunk, bilateral upper & lower extremities, toenails and fingernails were examined with the following findings:  -Erythematous plaques across the chest, back, forearms, and facial cheeks, more morbilliform in nature compared to yesterday ; erythematous-slightly violaceous patches on the dorsal feet       Medications:  Scheduled Meds: azelastine, 2 spray, Each Nostril, BID  calcium carbonate-vitamin D3, 1 tablet, oral, BID  enoxaparin, 30 mg, subcutaneous, q24h ELYSE  loratadine, 5 mg, oral, Daily  losartan, 50 mg, oral, Daily  magnesium sulfate, 4 g, intravenous, Once  montelukast, 10 mg, oral, Daily  multivitamin with minerals iron-free, 1 tablet, oral, Daily  potassium phosphate, 30 mmol, intravenous, Once  [Held by provider] pravastatin, " 40 mg, oral, Daily  predniSONE, 50 mg, oral, Daily  sennosides-docusate sodium, 1 tablet, oral, Nightly  [Held by provider] sulfaSALAzine, 1,000 mg, oral, Daily       Continuous Infusions:     PRN Meds: PRN medications: acetaminophen, acetaminophen, diclofenac sodium, polyethylene glycol     Results:  Results for orders placed or performed during the hospital encounter of 06/07/24 (from the past 24 hour(s))   Coagulation Screen   Result Value Ref Range    Protime 16.7 (H) 9.8 - 12.8 seconds    INR 1.5 (H) 0.9 - 1.1    aPTT 29 27 - 38 seconds   CBC and Auto Differential   Result Value Ref Range    WBC 6.4 4.4 - 11.3 x10*3/uL    nRBC 0.0 0.0 - 0.0 /100 WBCs    RBC 3.49 (L) 4.00 - 5.20 x10*6/uL    Hemoglobin 9.9 (L) 12.0 - 16.0 g/dL    Hematocrit 29.6 (L) 36.0 - 46.0 %    MCV 85 80 - 100 fL    MCH 28.4 26.0 - 34.0 pg    MCHC 33.4 32.0 - 36.0 g/dL    RDW 13.4 11.5 - 14.5 %    Platelets 194 150 - 450 x10*3/uL    Neutrophils % 65.1 40.0 - 80.0 %    Immature Granulocytes %, Automated 0.6 0.0 - 0.9 %    Lymphocytes % 32.9 13.0 - 44.0 %    Monocytes % 1.1 2.0 - 10.0 %    Eosinophils % 0.0 0.0 - 6.0 %    Basophils % 0.3 0.0 - 2.0 %    Neutrophils Absolute 4.19 1.60 - 5.50 x10*3/uL    Immature Granulocytes Absolute, Automated 0.04 0.00 - 0.50 x10*3/uL    Lymphocytes Absolute 2.12 0.80 - 3.00 x10*3/uL    Monocytes Absolute 0.07 0.05 - 0.80 x10*3/uL    Eosinophils Absolute 0.00 0.00 - 0.40 x10*3/uL    Basophils Absolute 0.02 0.00 - 0.10 x10*3/uL   Magnesium   Result Value Ref Range    Magnesium 1.65 1.60 - 2.40 mg/dL   Hepatic Function Panel   Result Value Ref Range    Albumin 3.0 (L) 3.4 - 5.0 g/dL    Bilirubin, Total 0.6 0.0 - 1.2 mg/dL    Bilirubin, Direct 0.3 0.0 - 0.3 mg/dL    Alkaline Phosphatase 267 (H) 33 - 136 U/L     (H) 7 - 45 U/L     (H) 9 - 39 U/L    Total Protein 5.5 (L) 6.4 - 8.2 g/dL   Phosphorus   Result Value Ref Range    Phosphorus 1.6 (L) 2.5 - 4.9 mg/dL   Basic Metabolic Panel   Result Value  Ref Range    Glucose 307 (H) 74 - 99 mg/dL    Sodium 143 136 - 145 mmol/L    Potassium 3.2 (L) 3.5 - 5.3 mmol/L    Chloride 109 (H) 98 - 107 mmol/L    Bicarbonate 21 21 - 32 mmol/L    Anion Gap 16 10 - 20 mmol/L    Urea Nitrogen 16 6 - 23 mg/dL    Creatinine 0.88 0.50 - 1.05 mg/dL    eGFR 65 >60 mL/min/1.73m*2    Calcium 9.3 8.6 - 10.6 mg/dL   Morphology   Result Value Ref Range    RBC Morphology See Below     Jason Cells Few         Assessment/Plan   Kerry Arevalo is a 84 y.o. female with a past medical history of HTN, T2DM, OA, RA, and spinal stenosis c/b lumbar radiculopathy  presented on 6/7/2024 with slurred speech and altered mental status and was admitted for workup of confusion . 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 Liver enzymes continue to increase, which can occur in the setting of DRESS. It is important to monitor symptoms and treat accordingly.      Recommendations:  -Punch biopsy pending   -Continue to hold sulfasalazine   -Continue oral Prednisone 50mg daily   -HHV-6, HHV-7, CMV, EBV pending   -Avoid sedating antihistamines given prior altered mental status   -Continue to trend daily LFTS and CBC with differential   -START Triamcinolone 0.1% cream twice daily to rash areas ; please dispense 454g jar      The patient was seen and discussed with attending physician Dr. Acosta. The assessment and plan was communicated to the care team.    Thank you for the consultation and for the opportunity to contribute to the care of this patient.      Pearl Garcia, DO   PGY2, Dermatology  Epic chat (preferred)  Team pager [phone]

## 2024-06-10 NOTE — PROGRESS NOTES
Physical Therapy    Physical Therapy Evaluation & Treatment    Patient Name: Kerry Arevalo  MRN: [id number]  Today's Date: 6/10/2024   Time Calculation  Start Time: 0901  Stop Time: 0933  Time Calculation (min): 32 min    Assessment/Plan   PT Assessment  PT Assessment Results: Decreased strength, Decreased range of motion, Decreased endurance, Impaired balance, Decreased mobility, Decreased coordination, Decreased safety awareness  Rehab Prognosis: Good  Barriers to Discharge: pt with stairs to enter  Strengths: Attitude of self  Barriers to Participation: Comorbidities  End of Session Communication: Bedside nurse  Assessment Comment: pt admitted for slurred speech and confusion . pt would benefit from skilled services to improve functional mobiltiy and balance. pt is at risk for falls.  End of Session Patient Position: Bed, 3 rail up, Alarm on (Rn present in the room)   IP OR SWING BED PT PLAN  Inpatient or Swing Bed: Inpatient  PT Plan  Treatment/Interventions: Bed mobility, Transfer training, Gait training, Stair training, Strengthening, Endurance training, Range of motion, Therapeutic exercise, Therapeutic activity, Home exercise program  PT Plan: Skilled PT  PT Frequency: 3 times per week  PT Discharge Recommendations: Moderate intensity level of continued care  Equipment Recommended upon Discharge:  (TBD)  PT Recommended Transfer Status: Assist x1  PT - OK to Discharge: Yes (Eval received and completed. Recs made.)      Subjective     General Visit Information:  General  Reason for Referral: Presented to the ED with slurred speech and confusion, likely side effect of medication  Past Medical History Relevant to Rehab: HTN, T2DM, OA, RA, and spinal stenosis c/b lumbar radiculopathy  Family/Caregiver Present: No  Co-Treatment: OT  Co-Treatment Reason: to maximize therapeutic benefit and provide for safe transfers  Prior to Session Communication: Bedside nurse  Patient Position Received: Bed, 3 rail up, Alarm  on  Preferred Learning Style: auditory, verbal  General Comment: pt sitting on EOB with OT upon entry. pt with incident of sudden confusion , difficutly following commands and extreme fatigue with urinaition. RN aware  Home Living:  Home Living  Type of Home: House  Lives With: Adult children (daughter lives upstairs.)  Home Adaptive Equipment: Walker rolling or standard, Cane, Wheelchair-manual  Home Layout: Two level, Full bath main level, Able to live on main level with bedroom/bathroom, Laundry in basement  Home Access: Stairs to enter with rails  Entrance Stairs-Number of Steps: 5  Bathroom Shower/Tub: Tub/shower unit  Bathroom Toilet: Standard  Bathroom Equipment: Shower chair with back, Grab bars in shower  Prior Level of Function:  Prior Function Per Pt/Caregiver Report  Level of [place]: Independent with ADLs and functional transfers, Needs assistance with homemaking  Receives Help From: Family  ADL Assistance: Independent  Homemaking Assistance: Needs assistance  Meal Prep: Minimal  Laundry: Moderate  Cleaning: Moderate  Driving/Transportation: Family/Friend  Homemaking Assistance Comments: Dtr assists with IADLs as needed  Ambulatory Assistance: Independent  Vocational: Retired  Leisure: TV, gardening  Hand Dominance: Right  Prior Function Comments: 2-3 falls in past year  Precautions:  Precautions  Medical Precautions: Fall precautions  Vital Signs:  Vital Signs  Heart Rate: 100  BP: (!) 75/40 (87/54 and then 110/63 taken manually by RN due to sudden onset of fatigue.)  BP Location: Left leg (unable to get in arm)  BP Method: Automatic  Patient Position: Sitting    Objective   Pain:  Pain Assessment  Pain Assessment: 0-10  Pain Score: 6  Pain Type: Acute pain  Pain Location: Leg  Pain Orientation: Left, Right  Pain Interventions: Repositioned  Cognition:  Cognition  Overall Cognitive Status: Impaired  Orientation Level: Oriented X4  Following Commands: Follows one step commands with increased  time  Safety Judgment: Decreased awareness of need for assistance  Problem Solving: Assistance required to identify errors made  Cognition Comments: Initially following commands and engaging in appropriate conversation, at end of session, pt's engagement limited by fatigue and very poor command following  Problem Solving: Exceptions to WFL  Complex Functional Tasks: Moderate  Novel Situations: Minimal  Routine Tasks: Minimal  Insight: Mild  Impulsive: Mildly  Processing Speed: Delayed    General Assessments:                  Activity Tolerance  Endurance: Tolerates 30 min exercise with multiple rests            Coordination  Movements are Fluid and Coordinated: No    Postural Control  Postural Control: Impaired  Posture Comment: rounded shoulders and flexed head.    Static Sitting Balance  Static Sitting-Balance Support: Feet supported  Static Sitting-Level of Assistance: Contact guard    Static Standing Balance  Static Standing-Balance Support: Bilateral upper extremity supported  Static Standing-Level of Assistance: Minimum assistance  Functional Assessments:  Bed Mobility  Bed Mobility: Yes  Bed Mobility 1  Bed Mobility 1: Supine to sitting  Level of Assistance 1: Contact guard  Bed Mobility Comments 1: HOB elevated  Bed Mobility 2  Bed Mobility  2: Sitting to supine  Level of Assistance 2: Dependent, +2  Bed Mobility Comments 2: Pt not following commands and providing minimal effort    Transfers  Transfer: Yes  Transfer 1  Transfer From 1: Bed to, Stand to  Transfer to 1: Stand, Bed  Technique 1: Sit to stand, Stand to sit  Transfer Device 1: Walker  Transfer Level of Assistance 1: Minimum assistance  Trials/Comments 1: use of ww  Transfers 2  Transfer From 2: Chair with arms to  Transfer to 2: Bed  Technique 2: Stand pivot  Transfer Level of Assistance 2: Dependent  Trials/Comments 2: Pt not following commands for hand placement on FWW or LE positioning, dependent transfer to bed for safety  purposes    Ambulation/Gait Training  Ambulation/Gait Training Performed: Yes  Ambulation/Gait Training 1  Surface 1: Level tile  Device 1: Rolling walker  Assistance 1: Moderate assistance (mod assist for 2 LOB)  Quality of Gait 1: Soft knee(s), Knee(s) buckle, Narrow base of support, Inconsistent stride length, Decreased step length  Comments/Distance (ft) 1: pt wtih 2 LOB posteriorly requiring mod assist . pt ambualted 15ft within the room    Stairs  Stairs: No  Extremity/Trunk Assessments:  RLE   RLE : Exceptions to WFL  Strength RLE  RLE Overall Strength: Greater than or equal to 3/5 as evidenced by functional mobility  LLE   LLE : Exceptions to WFL  Strength LLE  LLE Overall Strength: Greater than or equal to 3/5 as evidenced by functional mobility  Treatments:  Therapeutic Activity  Therapeutic Activity Performed: Yes  Therapeutic Activity 1: Functional mobility within room using FWW, cues for improved body and walker positioning, CGA initially, pt with 2-3 LOBs requiring Mod/Max A for stability. Pt demos poor safety awareness despite frequent cues.  Therapeutic Activity 2: Due to low BP, therapist assisted with repositioning in bed and education provided on positioning. Pt receptive.  Outcome Measures:  [facility] Basic Mobility  Turning from your back to your side while in a flat bed without using bedrails: A little  Moving from lying on your back to sitting on the side of a flat bed without using bedrails: A little  Moving to and from bed to chair (including a wheelchair): A little  Standing up from a chair using your arms (e.g. wheelchair or bedside chair): A lot  To walk in hospital room: A lot  Climbing 3-5 steps with railing: Total  Basic Mobility - Total Score: 14    Encounter Problems       Encounter Problems (Active)       Balance       STG - Maintains dynamic standing balance with upper extremity support with ww and  Min assist without LOB for 4 mins  (Progressing)       Start:  06/10/24    Expected  End:  06/24/24       INTERVENTIONS:  1. Practice standing with minimal support.  2. Educate patient about standing tolerance.  3. Educate patient about independence with gait, transfers, and ADL's.  4. Educate patient about use of assistive device.  5. Educate patient about self-directed care.            Mobility       STG - Patient will ambulate 50 ft with ww and CGA  (Progressing)       Start:  06/10/24    Expected End:  06/24/24            STG - Patient will ascend and descend four to six stairs with HR and min assist  (Progressing)       Start:  06/10/24    Expected End:  06/24/24               PT Transfers       STG - Transfer from bed to chair with CGA  (Progressing)       Start:  06/10/24    Expected End:  06/24/24            STG - Patient will perform bed mobility with CGA  (Progressing)       Start:  06/10/24    Expected End:  06/24/24                   Education Documentation  Precautions, taught by Jaqueline Ulloa, PT at 6/10/2024 11:28 AM.  Learner: Patient  Readiness: Eager  Method: Explanation  Response: Verbalizes Understanding, Needs Reinforcement    Mobility Training, taught by Jaqueline Ulloa, PT at 6/10/2024 11:28 AM.  Learner: Patient  Readiness: Eager  Method: Explanation  Response: Verbalizes Understanding, Needs Reinforcement    Education Comments  No comments found.

## 2024-06-10 NOTE — PROGRESS NOTES
Physical Therapy    Physical Therapy Evaluation    Patient Name: Kerry Arevalo  MRN: [id number]  Today's Date: 6/10/2024   Time Calculation  Start Time: 0901  Stop Time: 0933  Time Calculation (min): 32 min    Assessment/Plan   PT Assessment  PT Assessment Results: Decreased strength, Decreased range of motion, Decreased endurance, Impaired balance, Decreased mobility, Decreased coordination, Decreased safety awareness  Rehab Prognosis: Good  Barriers to Discharge: pt with stairs to enter  Strengths: Attitude of self  Barriers to Participation: Comorbidities  End of Session Communication: Bedside nurse  Assessment Comment: pt admitted for slurred speech and confusion . pt would benefit from skilled services to improve functional mobiltiy and balance. pt is at risk for falls.  End of Session Patient Position: Bed, 3 rail up, Alarm on (Rn present in the room)  IP OR SWING BED PT PLAN  Inpatient or Swing Bed: Inpatient  PT Plan  Treatment/Interventions: Bed mobility, Transfer training, Gait training, Stair training, Strengthening, Endurance training, Range of motion, Therapeutic exercise, Therapeutic activity, Home exercise program  PT Plan: Skilled PT  PT Frequency: 3 times per week  PT Discharge Recommendations: Moderate intensity level of continued care  Equipment Recommended upon Discharge:  (TBD)  PT Recommended Transfer Status: Assist x1  PT - OK to Discharge: Yes (Eval received and completed. Recs made.)      Subjective   General Visit Information:  General  Reason for Referral: Presented to the ED with slurred speech and confusion, likely side effect of medication  Past Medical History Relevant to Rehab: HTN, T2DM, OA, RA, and spinal stenosis c/b lumbar radiculopathy  Family/Caregiver Present: No  Co-Treatment: OT  Co-Treatment Reason: to maximize therapeutic benefit and provide for safe transfers  Prior to Session Communication: Bedside nurse  Patient Position Received: Bed, 3 rail up, Alarm on  Preferred  Learning Style: auditory, verbal  General Comment: pt sitting on EOB with OT upon entry. pt with incident of sudden confusion , difficutly following commands and extreme fatigue with urinaition. RN aware  Home Living:  Home Living  Type of Home: House  Lives With: Adult children (daughter lives upstairs.)  Home Adaptive Equipment: Walker rolling or standard, Cane, Wheelchair-manual  Home Layout: Two level, Full bath main level, Able to live on main level with bedroom/bathroom, Laundry in basement  Home Access: Stairs to enter with rails  Entrance Stairs-Number of Steps: 5  Bathroom Shower/Tub: Tub/shower unit  Bathroom Toilet: Standard  Bathroom Equipment: Shower chair with back, Grab bars in shower  Prior Level of Function:  Prior Function Per Pt/Caregiver Report  Level of [place]: Independent with ADLs and functional transfers, Needs assistance with homemaking  Receives Help From: Family  ADL Assistance: Independent  Homemaking Assistance: Needs assistance  Meal Prep: Minimal  Laundry: Moderate  Cleaning: Moderate  Driving/Transportation: Family/Friend  Homemaking Assistance Comments: Dtr assists with IADLs as needed  Ambulatory Assistance: Independent  Vocational: Retired  Leisure: TV, gardening  Hand Dominance: Right  Prior Function Comments: 2-3 falls in past year  Precautions:  Precautions  Medical Precautions: Fall precautions  Vital Signs:  Vital Signs  Heart Rate: 100  BP: (!) 75/40 (87/54 and then 110/63 taken manually by RN due to sudden onset of fatigue.)  BP Location: Left leg (unable to get in arm)  BP Method: Automatic  Patient Position: Sitting    Objective   Pain:  Pain Assessment  Pain Assessment: 0-10  Pain Score: 6  Pain Type: Acute pain  Pain Location: Leg  Pain Orientation: Left, Right  Pain Interventions: Repositioned  Cognition:  Cognition  Overall Cognitive Status: Impaired  Orientation Level: Oriented X4  Following Commands: Follows one step commands with increased time  Safety  Judgment: Decreased awareness of need for assistance  Problem Solving: Assistance required to identify errors made  Cognition Comments: Initially following commands and engaging in appropriate conversation, at end of session, pt's engagement limited by fatigue and very poor command following  Problem Solving: Exceptions to WFL  Complex Functional Tasks: Moderate  Novel Situations: Minimal  Routine Tasks: Minimal  Insight: Mild  Impulsive: Mildly  Processing Speed: Delayed    General Assessments:  Activity Tolerance  Endurance: Tolerates 30 min exercise with multiple rests            Postural Control  Postural Control: Impaired  Posture Comment: rounded shoulders and flexed head.    Static Sitting Balance  Static Sitting-Balance Support: Feet supported  Static Sitting-Level of Assistance: Contact guard    Static Standing Balance  Static Standing-Balance Support: Bilateral upper extremity supported  Static Standing-Level of Assistance: Minimum assistance  Functional Assessments:  Bed Mobility  Bed Mobility: Yes  Bed Mobility 1  Bed Mobility 1: Supine to sitting  Level of Assistance 1: Contact guard  Bed Mobility Comments 1: HOB elevated  Bed Mobility 2  Bed Mobility  2: Sitting to supine  Level of Assistance 2: Dependent, +2  Bed Mobility Comments 2: Pt not following commands and providing minimal effort    Transfers  Transfer: Yes  Transfer 1  Transfer From 1: Bed to, Stand to  Transfer to 1: Stand, Bed  Technique 1: Sit to stand, Stand to sit  Transfer Device 1: Walker  Transfer Level of Assistance 1: Minimum assistance  Trials/Comments 1: use of ww  Transfers 2  Transfer From 2: Chair with arms to  Transfer to 2: Bed  Technique 2: Stand pivot  Transfer Level of Assistance 2: Dependent  Trials/Comments 2: Pt not following commands for hand placement on FWW or LE positioning, dependent transfer to bed for safety purposes    Ambulation/Gait Training  Ambulation/Gait Training Performed: Yes  Ambulation/Gait Training  1  Surface 1: Level tile  Device 1: Rolling walker  Assistance 1: Moderate assistance (mod assist for 2 LOB)  Quality of Gait 1: Soft knee(s), Knee(s) buckle, Narrow base of support, Inconsistent stride length, Decreased step length  Comments/Distance (ft) 1: pt wtih 2 LOB posteriorly requiring mod assist . pt ambualted 15ft within the room    Stairs  Stairs: No  Extremity/Trunk Assessments:  RLE   RLE : Exceptions to WFL  Strength RLE  RLE Overall Strength: Greater than or equal to 3/5 as evidenced by functional mobility  LLE   LLE : Exceptions to WFL  Strength LLE  LLE Overall Strength: Greater than or equal to 3/5 as evidenced by functional mobility  Outcome Measures:  [facility] Basic Mobility  Turning from your back to your side while in a flat bed without using bedrails: A little  Moving from lying on your back to sitting on the side of a flat bed without using bedrails: A little  Moving to and from bed to chair (including a wheelchair): A little  Standing up from a chair using your arms (e.g. wheelchair or bedside chair): A lot  To walk in hospital room: A lot  Climbing 3-5 steps with railing: Total  Basic Mobility - Total Score: 14    Encounter Problems       Encounter Problems (Active)       Balance       STG - Maintains dynamic standing balance with upper extremity support with ww and  Min assist without LOB for 4 mins  (Progressing)       Start:  06/10/24    Expected End:  06/24/24       INTERVENTIONS:  1. Practice standing with minimal support.  2. Educate patient about standing tolerance.  3. Educate patient about independence with gait, transfers, and ADL's.  4. Educate patient about use of assistive device.  5. Educate patient about self-directed care.            Mobility       STG - Patient will ambulate 50 ft with ww and CGA  (Progressing)       Start:  06/10/24    Expected End:  06/24/24            STG - Patient will ascend and descend four to six stairs with HR and min assist  (Progressing)        Start:  06/10/24    Expected End:  06/24/24               PT Transfers       STG - Transfer from bed to chair with CGA  (Progressing)       Start:  06/10/24    Expected End:  06/24/24            STG - Patient will perform bed mobility with CGA  (Progressing)       Start:  06/10/24    Expected End:  06/24/24                   Education Documentation  Precautions, taught by Jaqueline Ulloa, PT at 6/10/2024 11:28 AM.  Learner: Patient  Readiness: Eager  Method: Explanation  Response: Verbalizes Understanding, Needs Reinforcement    Mobility Training, taught by Jaqueline Ulloa, PT at 6/10/2024 11:28 AM.  Learner: Patient  Readiness: Eager  Method: Explanation  Response: Verbalizes Understanding, Needs Reinforcement    Education Comments  No comments found.

## 2024-06-10 NOTE — PROGRESS NOTES
Subjective     Interval events:  Pt states rash has spread more over her body and she is very uncomfortable from the itching. She also endorsed some LE edema and tenderness. She denies any abdominal pain, fevers/chills, dyspnea/chest pain.  Pt orthostatic while working with PT.       Objective     Vitals:    24 Hour Vitals  Temp:  [36.4 °C (97.5 °F)-37 °C (98.6 °F)] 36.8 °C (98.2 °F)  Heart Rate:  [] 99  Resp:  [16-19] 16  BP: (122-149)/(56-78) 136/78    Temp (24hrs), Av.8 °C (98.3 °F), Min:36.4 °C (97.5 °F), Max:37 °C (98.6 °F)     24 hour Intake/Output    Intake/Output Summary (Last 24 hours) at 6/10/2024 0656  Last data filed at 2024 1800  Gross per 24 hour   Intake 1142.5 ml   Output --   Net 1142.5 ml        Physical exam:  Constitutional: Well-developed female in no acute distress.  HEENT: NCAT. EOMI. No JVD  Respiratory: CTAB. No wheezes, crackles, or rhonchi. Normal respiratory effort on RA.  Cardiovascular: RRR, normal S1/S2, No murmurs, rubs, or gallops.   Abdominal: Soft, nondistended, nontender to palpation. No rebound or guarding  Neuro: CN II-XII grossly intact. Moving all extremities with no focal deficits.   MSK: WWP, R>L LE edema.   Skin: generalized blanching, urticarial rash present all over body.   Psych: Appropriate mood and affect.      Medications   Scheduled Medications  azelastine, 2 spray, Each Nostril, BID  calcium carbonate-vitamin D3, 1 tablet, oral, BID  enoxaparin, 30 mg, subcutaneous, q24h ELYSE  loratadine, 5 mg, oral, Daily  losartan, 50 mg, oral, Daily  montelukast, 10 mg, oral, Daily  multivitamin with minerals iron-free, 1 tablet, oral, Daily  potassium phosphate, 30 mmol, intravenous, Once  [Held by provider] pravastatin, 40 mg, oral, Daily  predniSONE, 50 mg, oral, Daily  sennosides-docusate sodium, 1 tablet, oral, Nightly  [Held by provider] sulfaSALAzine, 1,000 mg, oral, Daily     Continuous Medications    PRN Medications  PRN medications: acetaminophen,  acetaminophen, diclofenac sodium, polyethylene glycol       Labs  CBC  Results from last 72 hours   Lab Units 06/09/24  1746 06/07/24  2136   WBC AUTO x10*3/uL 6.4 6.9   HEMOGLOBIN g/dL 9.9* 9.5*   HEMATOCRIT % 29.6* 26.3*   PLATELETS AUTO x10*3/uL 194 184        BMP  Results from last 72 hours   Lab Units 06/09/24  1746 06/07/24  2136   SODIUM mmol/L 143 140   POTASSIUM mmol/L 3.2* 3.9   CHLORIDE mmol/L 109* 109*   BUN mg/dL 16 20   CREATININE mg/dL 0.88 1.03   MAGNESIUM mg/dL 1.65 1.80   PHOSPHORUS mg/dL 1.6* 2.1*       Imaging:  === 06/07/24 ===    US GALLBLADDER    - Impression -  1. Gallbladder sludge without sonographic evidence of acute  cholecystitis.  2. Few small simple cysts and calcified granuloma within the liver.    I personally reviewed the images/study and I agree with the findings  as stated by resident physician Dr. Yevgeniy Dong.    MACRO:  None      Signed by: Hossein Perry 6/8/2024 4:50 PM  Dictation workstation:   [id number]   === 06/07/24 ===    CT HEAD WO IV CONTRAST    - Impression -  1. No acute intracranial hemorrhage or mass effect.  2. Paranasal sinus mucosal thickening most prominently affecting the  right maxillary sinus. 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 ENT follow-up is recommended.      I personally reviewed the images/study and I agree with the findings  as stated by Resident Timothy Costa MD. This study was interpreted  at [facility], [place], [place].    MACRO:  None    Signed by: Albin Jonas 6/8/2024 12:17 AM  Dictation workstation:   [id number]          Assessment and plan:  Kerry Arevalo is a 84 y.o. female with PMHx of HTN, T2DM, OA, RA, and spinal stenosis c/b lumbar radiculopathy who presented to the ED with slurred speech and confusion.  Initially suspected to be medication induced (recently started on medrol at urgent care for back pain and family suspects inappropriate usage by pt) vs less likely  TIA (given recurrence of AMS and new rash).  Now also with concern for DRESS per Dermatology - given timeline of symptoms (pt had started sulfasalazine ~6 weeks prior to presentation) with urticarial rash, new transaminitis and altered mental status. Punch biopsy performed, but currently pursuing treatment with prednisone for DRESS pending results.     Updates 6/10/24  -Started trimacinolone cream BID  -Add SSI as pt with hyperglycemia on last rfp. Likely iso steroid use  -DVT bl ultrasound ordered given LE edema  -500cc bolus for orthostatic sx while working with PT  -LFTs downtrending       #AMS  #Urticaria  #C/f DRESS  ::Initially suspected to be medication induced (recently started on medrol at urgent care for back pain and family suspects inappropriate usage by pt) vs less likely TIA (recurrence of AMS and new rash).  :: No focal neuro deficits on evaluation in ED   :: CTH no acute findings, paranasal mucosal thickening   :: CXR without acute process  :: TSH wnl  :: S/p IV benadryl 50 mg IV and Famotidine 20 mg IV in the ED -> significant confusion after receiving benadryl, unclear if due to underlying process or due to benadryl sedative effect   :: Dermatology consulted for rash. Noted that given timeline of symptoms (pt had started sulfasalazine ~6 weeks prior to presentation), new transaminitis, rash and altered mental status, DRESS is a concern. Punch biopsy performed, but recommending treatment for DRESS pending results. No eosinophilia at this time.   :: UDS negative  - UA pending  - HOLD tizanidine, sulfasalazine  - Pred 50mg daily per Derm recs  - F/up punch biopsy  -Send CMV/HHV6/HHV7/EBV per derm recs     # Elevated Liver Enzymes  :: Hepatocellular pattern, with concern for DRESS (see above)  :: No RUQ abdominal pain  - RUQ US     # Anemia  :: Hgb 9.5 baseline ~12-13  :: No evidence of bleeding   :: Possibly secondary to sulfasalazine   - CTM         # HTN  :: BP soft on admission but after 1L LR pt  was hypertensive SBP 150s when recheck in room   - C/w Losartan 50 mg daily      # Sinusitis  -C/w Azelastine spray   -C/w Montelukast 10 mg   - ENT follow up outpt     #Steroid induced hyperglycemia  ::HbA1c 5.8 on admission  -Start SSI      F : PRN  E: PRN  N: Regular      DVT prophylaxis: Lovenox subq     Code Status: DNR and No Intubation (confirmed on admission)   NOK: Kaitlin Hammonds (daughter) [phone]                Patient seen and discussed with attending physician.    Safia Abdullahi MD  PGY-1 Internal Medicine

## 2024-06-10 NOTE — PROGRESS NOTES
Occupational Therapy    Evaluation and Treatment    Patient Name: Kerry Arevalo  MRN: [id number]  Today's Date: 6/10/2024  Room: 55[facility]5568-A  Time Calculation  Start Time: 0844  Stop Time: 0932  Time Calculation (min): 48 min    Assessment  IP OT Assessment  OT Assessment: Pts ability to complete ADLs currently limited by deficits in cognition, activity tolerance, dynamic balance, and functional strength. She will benefit from skilled OT while admitted to increase IND in ADLs/IADLs prior to d/c. Pt with fluccuating status during evaluation. Initially CGA-Min A and progressed to Max A-DEP. Pt with increased confusion, low BP while sititng, and urinary incontinence. RN informed and at bedside upon therapist's exit.  Prognosis: Good  Barriers to Discharge: None  Evaluation/Treatment Tolerance: Treatment limited secondary to medical complications (Comment) (BP dropped to 75/40 in chair, increased drowsiness and poor command following)  Medical Staff Made Aware: Yes  End of Session Communication: Bedside nurse  End of Session Patient Position: Bed, 3 rail up, Alarm on (RN present \)  Plan:  Treatment Interventions: ADL retraining, Functional transfer training, UE strengthening/ROM, Endurance training, Cognitive reorientation, Patient/family training, Equipment evaluation/education, Compensatory technique education  OT Frequency: 3 times per week  OT Discharge Recommendations: Moderate intensity level of continued care  OT Recommended Transfer Status: Assist of 2  OT - OK to Discharge: Yes    Subjective   Current Problem:  1. Disorientation  Urinalysis with Reflex Microscopic    Urinalysis with Reflex Microscopic    Urinalysis with Reflex Microscopic    Microscopic Only, Urine    Microscopic Only, Urine      2. Hypertension  Urinalysis with Reflex Microscopic    Urinalysis with Reflex Microscopic    Urinalysis with Reflex Microscopic    Microscopic Only, Urine    Microscopic Only, Urine      3. Fall  Urinalysis with Reflex  Microscopic    Urinalysis with Reflex Microscopic    Urinalysis with Reflex Microscopic    Microscopic Only, Urine    Microscopic Only, Urine      4. Bilateral leg edema  Urinalysis with Reflex Microscopic    Urinalysis with Reflex Microscopic    Urinalysis with Reflex Microscopic    Microscopic Only, Urine    Microscopic Only, Urine    Lower extremity venous duplex bilateral    Lower extremity venous duplex bilateral      5. Visual hallucination  Urinalysis with Reflex Microscopic    Urinalysis with Reflex Microscopic    Urinalysis with Reflex Microscopic    Microscopic Only, Urine    Microscopic Only, Urine      6. Tarsal tunnel syndrome of right side  Urinalysis with Reflex Microscopic    Urinalysis with Reflex Microscopic    Urinalysis with Reflex Microscopic    Microscopic Only, Urine    Microscopic Only, Urine      7. Spinal stenosis  Urinalysis with Reflex Microscopic    Urinalysis with Reflex Microscopic    Urinalysis with Reflex Microscopic    Microscopic Only, Urine    Microscopic Only, Urine      8. Sleep disturbance  Urinalysis with Reflex Microscopic    Urinalysis with Reflex Microscopic    Urinalysis with Reflex Microscopic    Microscopic Only, Urine    Microscopic Only, Urine      9. Sensorineural hearing loss of left ear  Urinalysis with Reflex Microscopic    Urinalysis with Reflex Microscopic    Urinalysis with Reflex Microscopic    Microscopic Only, Urine    Microscopic Only, Urine      10. Sacroiliac pain  Urinalysis with Reflex Microscopic    Urinalysis with Reflex Microscopic    Urinalysis with Reflex Microscopic    Microscopic Only, Urine    Microscopic Only, Urine      11. Rheumatoid arthritis (Multi)  Urinalysis with Reflex Microscopic    Urinalysis with Reflex Microscopic    Urinalysis with Reflex Microscopic    Microscopic Only, Urine    Microscopic Only, Urine      12. Prediabetes  Urinalysis with Reflex Microscopic    Urinalysis with Reflex Microscopic    Urinalysis with Reflex  Microscopic    Microscopic Only, Urine    Microscopic Only, Urine      13. Pain in both feet  Urinalysis with Reflex Microscopic    Urinalysis with Reflex Microscopic    Urinalysis with Reflex Microscopic    Microscopic Only, Urine    Microscopic Only, Urine      14. Osteoarthritis of right ankle and foot  Urinalysis with Reflex Microscopic    Urinalysis with Reflex Microscopic    Urinalysis with Reflex Microscopic    Microscopic Only, Urine    Microscopic Only, Urine      15. 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 Macular degeneration  Urinalysis with Reflex Microscopic    Urinalysis with Reflex Microscopic    Urinalysis with Reflex Microscopic    Microscopic Only, Urine    Microscopic Only, Urine      18. Lumbar spinal stenosis  Urinalysis with Reflex Microscopic    Urinalysis with Reflex Microscopic    Urinalysis with Reflex Microscopic    Microscopic Only, Urine    Microscopic Only, Urine      19. Lumbar radicular pain  Urinalysis with Reflex Microscopic    Urinalysis with Reflex Microscopic    Urinalysis with Reflex Microscopic    Microscopic Only, Urine    Microscopic Only, Urine      20. Hypokalemia  Urinalysis with Reflex Microscopic    Urinalysis with Reflex Microscopic    Urinalysis with Reflex Microscopic    Microscopic Only, Urine    Microscopic Only, Urine      21. Fatigue  Urinalysis with Reflex Microscopic    Urinalysis with Reflex Microscopic    Urinalysis with Reflex Microscopic    Microscopic Only, Urine    Microscopic Only, Urine      22. Essential hypertension  Urinalysis with Reflex Microscopic    Urinalysis with Reflex Microscopic    Urinalysis with  Reflex Microscopic    Microscopic Only, Urine    Microscopic Only, Urine      23. Elevated serum cholesterol  Urinalysis with Reflex Microscopic    Urinalysis with Reflex Microscopic    Urinalysis with Reflex Microscopic    Microscopic Only, Urine    Microscopic Only, Urine      24. Cough  Urinalysis with Reflex Microscopic    Urinalysis with Reflex Microscopic    Urinalysis with Reflex Microscopic    Microscopic Only, Urine    Microscopic Only, Urine      25. Constipation  Urinalysis with Reflex Microscopic    Urinalysis with Reflex Microscopic    Urinalysis with Reflex Microscopic    Microscopic Only, Urine    Microscopic Only, Urine      26. Carpal tunnel syndrome on both sides  Urinalysis with Reflex Microscopic    Urinalysis with Reflex Microscopic    Urinalysis with Reflex Microscopic    Microscopic Only, Urine    Microscopic Only, Urine      27. Asthma (HHS-HCC)  Urinalysis with Reflex Microscopic    Urinalysis with Reflex Microscopic    Urinalysis with Reflex Microscopic    Microscopic Only, Urine    Microscopic Only, Urine      28. Allergic rhinitis  Urinalysis with Reflex Microscopic    Urinalysis with Reflex Microscopic    Urinalysis with Reflex Microscopic    Microscopic Only, Urine    Microscopic Only, Urine      29. Age related osteoporosis  Urinalysis with Reflex Microscopic    Urinalysis with Reflex Microscopic    Urinalysis with Reflex Microscopic    Microscopic Only, Urine    Microscopic Only, Urine      30. Rash and other nonspecific skin eruption  Biopsy    Biopsy        General:  Reason for Referral: Presented to the ED with slurred speech and confusion, likely side effect of medication  Past Medical History Relevant to Rehab: HTN, T2DM, OA, RA, and spinal stenosis c/b lumbar radiculopathy  Co-Treatment: PT  Co-Treatment Reason: PT present for mobility portion of evaluation to maximize pt's safety and increase benefit of provided services.  Prior to Session Communication: Bedside nurse  Patient  "Position Received: Bed, 3 rail up, Alarm on  Family/Caregiver Present: No  General Comment: Pt pleasant and agreeable to OT eval, reports \"I was so confused, but it has gotten much better.\" Pt with sudden onset of drowsiness and difficulty following commands at end of session, RN informed.   Precautions:  Medical Precautions: Fall precautions  Vital Signs:  Heart Rate: 103  Heart Rate Source: Monitor  SpO2: 100 %  BP: (!) 75/40 (75/40 while sitting, 110/63 once returned to bed with feet elevated)  BP Location: Left arm (and left leg)  BP Method: Automatic (second taken manually by RN)  Patient Position: Sitting  Pain:  Pain Assessment  Pain Assessment: 0-10  Pain Score: 6  Pain Type: Acute pain  Pain Location: Leg  Pain Orientation: Left, Right  Pain Interventions: Repositioned  Response to Interventions: No change  Lines/Tubes/Drains:         Objective   Cognition:  Overall Cognitive Status: Impaired  Orientation Level: Oriented X4  Following Commands: Follows one step commands with increased time  Safety Judgment: Decreased awareness of need for assistance  Problem Solving: Assistance required to identify errors made  Cognition Comments: Initially following commands and engaging in appropriate conversation, at end of session, pt's engagement limited by fatigue and very poor command following  Problem Solving: Exceptions to WFL  Safety/Judgement: Exceptions to WFL  Complex Functional Tasks: Moderate  Novel Situations: Minimal  Routine Tasks: Minimal  Insight: Mild  Impulsive: Mildly  Processing Speed: Delayed           Home Living:  Type of Home: House  Lives With: Adult children (Dtr lives on second floor, works)  Home Adaptive Equipment: Walker rolling or standard, Cane, Wheelchair-manual  Home Layout: Two level, Full bath main level, Able to live on main level with bedroom/bathroom, Laundry in basement  Home Access: Stairs to enter with rails  Entrance Stairs-Number of Steps: 5  Bathroom Shower/Tub: Tub/shower " unit  Bathroom Toilet: Standard  Bathroom Equipment: Shower chair with back, Grab bars in shower   Prior Function:  Level of [place]: Independent with ADLs and functional transfers, Needs assistance with homemaking  Receives Help From: Family  ADL Assistance: Independent  Homemaking Assistance: Needs assistance  Meal Prep: Minimal  Laundry: Moderate  Cleaning: Moderate  Driving/Transportation: Family/Friend  Homemaking Assistance Comments: Dtr assists with IADLs as needed  Ambulatory Assistance: Independent (Cane)  Vocational: Retired  Leisure: TV, gardening  Hand Dominance: Right  Prior Function Comments: 2-3 falls in past year  IADL History:  Homemaking Responsibilities: Yes  Meal Prep Responsibility: Secondary  Laundry Responsibility: Secondary  Cleaning Responsibility: Secondary  Bill Paying/Finance Responsibility: Primary  Shopping Responsibility: Secondary  Current License: No  Mode of Transportation: Family  Occupation: Retired  Type of Occupation:   Leisure and Hobbies: TV, gardening  ADL:  Eating Assistance: Independent (Anticipated)  Grooming Assistance: Minimal  Grooming Deficit: Steadying  Bathing Assistance: Moderate (Anticipated)  UE Dressing Assistance: Minimal  UE Dressing Deficit: Pull around back, Pull down in back  LE Dressing Assistance: Minimal  LE Dressing Deficit: Pull up over hips  Toileting Assistance with Device: Moderate (Anticipated)  Activity Tolerance:  Endurance: Tolerates 30 min exercise with multiple rests  Balance:  Dynamic Standing Balance  Dynamic Standing-Balance Support: Bilateral upper extremity supported  Dynamic Standing-Comments: Mod A with FWW, 2-3 LOB  Static Sitting Balance  Static Sitting-Balance Support: No upper extremity supported, Feet supported  Static Sitting-Level of Assistance: Close supervision  Static Standing Balance  Static Standing-Balance Support: Bilateral upper extremity supported  Static Standing-Level of Assistance: Contact guard  Bed  Mobility/Transfers: Bed Mobility/Transfers: Bed Mobility  Bed Mobility: Yes  Bed Mobility 1  Bed Mobility 1: Supine to sitting  Level of Assistance 1: Contact guard  Bed Mobility Comments 1: HOB elevated, use of bed rail  Bed Mobility 2  Bed Mobility  2: Sitting to supine  Level of Assistance 2: Dependent, +2  Bed Mobility Comments 2: Pt not following commands and providing minimal effort   and Transfers  Transfer: Yes  Transfer 1  Transfer From 1: Bed to, Stand to  Transfer to 1: Stand, Bed  Technique 1: Sit to stand, Stand to sit  Transfer Device 1: Walker  Transfer Level of Assistance 1: Minimum assistance  Trials/Comments 1: Min A with FWW, cues for improved positioning  Transfers 2  Transfer From 2: Chair with arms to  Transfer to 2: Bed  Technique 2: Stand pivot  Transfer Level of Assistance 2: Dependent  Trials/Comments 2: Pt not following commands for hand placement on FWW or LE positioning, dependent transfer to bed for safety purposes  IADL's:   Homemaking Responsibilities: Yes  Meal Prep Responsibility: Secondary  Laundry Responsibility: Secondary  Cleaning Responsibility: Secondary  Bill Paying/Finance Responsibility: Primary  Shopping Responsibility: Secondary  Current License: No  Mode of Transportation: Family  Occupation: Retired  Type of Occupation:   Leisure and Hobbies: TV, gardening  Vision: Vision - Basic Assessment  Current Vision: Wears glasses only for reading   and    Sensation:  Light Touch: Partial deficits in the RUE  Strength:  Strength Comments: Mild impairment L UE, Moderate impairemet R UE  Perception:  Inattention/Neglect: Appears intact  Coordination:  Movements are Fluid and Coordinated: No  Upper Body Coordination: FMC and GMC deficits   Hand Function:  Hand Function  Gross Grasp: Functional  Coordination: Functional  Extremities:   RUE   RUE : Exceptions to WFL (4-/5), LUE   LUE: Exceptions to WFL (4/5),  , and        Outcome Measures: [facility] Daily Activity  Putting  on and taking off regular lower body clothing: A little  Bathing (including washing, rinsing, drying): A lot  Putting on and taking off regular upper body clothing: A little  Toileting, which includes using toilet, bedpan or urinal: A lot  Taking care of personal grooming such as brushing teeth: A little  Eating Meals: A little  Daily Activity - Total Score: 16         ,     OT Adult Other Outcome Measures  4AT: 4 AT +    Education Documentation  Body Mechanics, taught by Robinson Fierro OT at 6/10/2024 10:33 AM.  Learner: Patient  Readiness: Acceptance  Method: Explanation, Demonstration  Response: Verbalizes Understanding, Needs Reinforcement    Precautions, taught by Robinson Fierro OT at 6/10/2024 10:33 AM.  Learner: Patient  Readiness: Acceptance  Method: Explanation, Demonstration  Response: Verbalizes Understanding, Needs Reinforcement    ADL Training, taught by Robinson Fierro OT at 6/10/2024 10:33 AM.  Learner: Patient  Readiness: Acceptance  Method: Explanation, Demonstration  Response: Verbalizes Understanding, Needs Reinforcement    Education Comments  No comments found.        Goals:   Encounter Problems       Encounter Problems (Active)       ADLs       Patient with complete lower body dressing with supervision level of assistance donning and doffing all LE clothes  with no adaptive equipment while EOB and standing  (Progressing)       Start:  06/10/24    Expected End:  07/01/24            Patient will complete daily grooming tasks brushing teeth and washing face/hair with supervision level of assistance and PRN adaptive equipment while standing. (Progressing)       Start:  06/10/24    Expected End:  07/01/24            Patient will complete toileting including hygiene clothing management/hygiene with supervision level of assistance and raised toilet seat and grab bars. (Progressing)       Start:  06/10/24    Expected End:  07/01/24               BALANCE       Pt will maintain dynamic standing balance during  ADL task with supervision level of assistance in order to demonstrate decreased risk of falling and improved postural control. (Progressing)       Start:  06/10/24    Expected End:  07/01/24               COGNITION/SAFETY       Patient will follow >80% Simple commands to allow improved ADL performance. (Progressing)       Start:  06/10/24    Expected End:  07/01/24               MOBILITY       Patient will perform Functional mobility max Household distances/Community Distances with supervision level of assistance and least restrictive device in order to improve safety and functional mobility. (Progressing)       Start:  06/10/24    Expected End:  07/01/24                   Treatment Completed on Evaluation  Activities of Daily Living:    Grooming  Grooming Level of Assistance: Close supervision  Grooming Where Assessed: Bed level  Grooming Comments: SBA while in bed following setup assist, pt washed face with cues for completion.        UE Dressing  UE Dressing Level of Assistance: Minimum assistance  UE Dressing Where Assessed: Edge of bed  UE Dressing Comments: Min A to manage gown around backside and secure, cues for increased IND  LE Dressing  LE Dressing: Yes  Pants Level of Assistance: Minimum assistance  LE Dressing Where Assessed: Edge of bed  LE Dressing Comments: Education on modified technique for safety, pt able to thread B LEs into pants with increased time while sitting EOB, she stood to FWW with Min A for balance and Min A to manage over hips. Increased time required.     Therapy/Activity:     Therapeutic Activity  Therapeutic Activity Performed: Yes  Therapeutic Activity 1: Functional mobility within room using FWW, cues for improved body and walker positioning, CGA initially, pt with 2-3 LOBs requiring Mod/Max A for stability. Pt demos poor safety awareness despite frequent cues.  Therapeutic Activity 2: Due to low BP, therapist assisted with repositioning in bed and education provided on  positioning. Pt receptive.      06/10/24 at 10:34 AM   Robinson Fierro OT   Rehab Office: [phone]

## 2024-06-10 NOTE — PROGRESS NOTES
DERMATOLOGY CONSULT PROGRESS NOTE  Name: Kerry Arevalo  MRN: [id number]  : 1940    Subjective    Patient seen and examined this morning at bedside. She states her rash is unchanged from yesterday. She reports rash is itchy and painful.      Objective    Vitals:    06/10/24 0419 06/10/24 0844 06/10/24 0901 06/10/24 0915   BP: 136/78 (!) 75/40 (!) 75/40 110/63   BP Location:  Left arm Left leg    Patient Position: Lying Sitting Sitting    Pulse: 99 103 100    Resp: 16      Temp: 36.8 °C (98.2 °F)      TempSrc: Temporal      SpO2: 100% 100%     Weight:       Height:          Exam    Physical Exam   GEN: no acute distress  NEURO: moving all extremities   EYES: conjunctiva and eyelids normal. No conjunctival injection or erosions appreciated  ENT:   - Lips: normal  - Teeth/gums: normal  NECK: normal and symmetric.   CV: no varicosities, warmth or tenderness of extremities.  GI: Flat abdomen. Non-tender.   LYMPH: no LAD   EXTREMITIES: no distal digital clubbing, cyanosis, petechiae   SKIN: A full body skin exam including scalp, face, eyes, ears, neck, trunk, bilateral upper & lower extremities, toenails and fingernails were examined with the following findings:  -Involving the face, ears, trunk, and bilateral upper and lower extremities, there are erythematous edematous papules coalescing into plaques.     Medications:  Scheduled Meds: azelastine, 2 spray, Each Nostril, BID  calcium carbonate-vitamin D3, 1 tablet, oral, BID  enoxaparin, 30 mg, subcutaneous, q24h ELYSE  insulin lispro, 0-10 Units, subcutaneous, TID  loratadine, 10 mg, oral, Daily  [Held by provider] losartan, 50 mg, oral, Daily  montelukast, 10 mg, oral, Daily  multivitamin with minerals iron-free, 1 tablet, oral, Daily  [Held by provider] pravastatin, 40 mg, oral, Daily  predniSONE, 50 mg, oral, Daily  [Held by provider] sulfaSALAzine, 1,000 mg, oral, Daily  triamcinolone, , Topical, BID       Continuous Infusions:     PRN Meds: PRN medications:  acetaminophen, acetaminophen, dextrose, diclofenac sodium, glucagon, polyethylene glycol     Results:  Results for orders placed or performed during the hospital encounter of 06/07/24 (from the past 24 hour(s))   Coagulation Screen   Result Value Ref Range    Protime 16.7 (H) 9.8 - 12.8 seconds    INR 1.5 (H) 0.9 - 1.1    aPTT 29 27 - 38 seconds   CBC and Auto Differential   Result Value Ref Range    WBC 6.4 4.4 - 11.3 x10*3/uL    nRBC 0.0 0.0 - 0.0 /100 WBCs    RBC 3.49 (L) 4.00 - 5.20 x10*6/uL    Hemoglobin 9.9 (L) 12.0 - 16.0 g/dL    Hematocrit 29.6 (L) 36.0 - 46.0 %    MCV 85 80 - 100 fL    MCH 28.4 26.0 - 34.0 pg    MCHC 33.4 32.0 - 36.0 g/dL    RDW 13.4 11.5 - 14.5 %    Platelets 194 150 - 450 x10*3/uL    Neutrophils % 65.1 40.0 - 80.0 %    Immature Granulocytes %, Automated 0.6 0.0 - 0.9 %    Lymphocytes % 32.9 13.0 - 44.0 %    Monocytes % 1.1 2.0 - 10.0 %    Eosinophils % 0.0 0.0 - 6.0 %    Basophils % 0.3 0.0 - 2.0 %    Neutrophils Absolute 4.19 1.60 - 5.50 x10*3/uL    Immature Granulocytes Absolute, Automated 0.04 0.00 - 0.50 x10*3/uL    Lymphocytes Absolute 2.12 0.80 - 3.00 x10*3/uL    Monocytes Absolute 0.07 0.05 - 0.80 x10*3/uL    Eosinophils Absolute 0.00 0.00 - 0.40 x10*3/uL    Basophils Absolute 0.02 0.00 - 0.10 x10*3/uL   Magnesium   Result Value Ref Range    Magnesium 1.65 1.60 - 2.40 mg/dL   Hepatic Function Panel   Result Value Ref Range    Albumin 3.0 (L) 3.4 - 5.0 g/dL    Bilirubin, Total 0.6 0.0 - 1.2 mg/dL    Bilirubin, Direct 0.3 0.0 - 0.3 mg/dL    Alkaline Phosphatase 267 (H) 33 - 136 U/L     (H) 7 - 45 U/L     (H) 9 - 39 U/L    Total Protein 5.5 (L) 6.4 - 8.2 g/dL   Phosphorus   Result Value Ref Range    Phosphorus 1.6 (L) 2.5 - 4.9 mg/dL   Basic Metabolic Panel   Result Value Ref Range    Glucose 307 (H) 74 - 99 mg/dL    Sodium 143 136 - 145 mmol/L    Potassium 3.2 (L) 3.5 - 5.3 mmol/L    Chloride 109 (H) 98 - 107 mmol/L    Bicarbonate 21 21 - 32 mmol/L    Anion Gap 16 10 -  20 mmol/L    Urea Nitrogen 16 6 - 23 mg/dL    Creatinine 0.88 0.50 - 1.05 mg/dL    eGFR 65 >60 mL/min/1.73m*2    Calcium 9.3 8.6 - 10.6 mg/dL   Morphology   Result Value Ref Range    RBC Morphology See Below     Jason Cells Few    POCT GLUCOSE   Result Value Ref Range    POCT Glucose 120 (H) 74 - 99 mg/dL   CBC and Auto Differential   Result Value Ref Range    WBC 9.9 4.4 - 11.3 x10*3/uL    nRBC 0.0 0.0 - 0.0 /100 WBCs    RBC 3.48 (L) 4.00 - 5.20 x10*6/uL    Hemoglobin 9.9 (L) 12.0 - 16.0 g/dL    Hematocrit 29.1 (L) 36.0 - 46.0 %    MCV 84 80 - 100 fL    MCH 28.4 26.0 - 34.0 pg    MCHC 34.0 32.0 - 36.0 g/dL    RDW 13.5 11.5 - 14.5 %    Platelets 215 150 - 450 x10*3/uL    Immature Granulocytes %, Automated 1.1 (H) 0.0 - 0.9 %    Immature Granulocytes Absolute, Automated 0.11 0.00 - 0.50 x10*3/uL   Magnesium   Result Value Ref Range    Magnesium 2.52 (H) 1.60 - 2.40 mg/dL   Hepatic Function Panel   Result Value Ref Range    Albumin 2.9 (L) 3.4 - 5.0 g/dL    Bilirubin, Total 0.6 0.0 - 1.2 mg/dL    Bilirubin, Direct 0.2 0.0 - 0.3 mg/dL    Alkaline Phosphatase 230 (H) 33 - 136 U/L     (H) 7 - 45 U/L     (H) 9 - 39 U/L    Total Protein 5.1 (L) 6.4 - 8.2 g/dL   Phosphorus   Result Value Ref Range    Phosphorus 3.4 2.5 - 4.9 mg/dL   Basic Metabolic Panel   Result Value Ref Range    Glucose 113 (H) 74 - 99 mg/dL    Sodium 146 (H) 136 - 145 mmol/L    Potassium 3.3 (L) 3.5 - 5.3 mmol/L    Chloride 110 (H) 98 - 107 mmol/L    Bicarbonate 27 21 - 32 mmol/L    Anion Gap 12 10 - 20 mmol/L    Urea Nitrogen 12 6 - 23 mg/dL    Creatinine 0.59 0.50 - 1.05 mg/dL    eGFR 90 >60 mL/min/1.73m*2    Calcium 9.3 8.6 - 10.6 mg/dL   Marlena-Barr Virus Antibody Panel (VCA IgG/IgM, EA IgG, NA IgG)   Result Value Ref Range    EBV VCA, IgG  Positive (A) Negative    EBV VCA, IgM  Negative Negative    EBV Early Antigen Antibody, IgG Negative Negative    EBV Nuclear Antigen Antibody, IgG Positive (A) Negative   Cytomegalovirus IgG    Result Value Ref Range    Cytomegalovirus IgG Reactive (A) Nonreactive   Manual Differential   Result Value Ref Range    Neutrophils %, Manual 75.6 40.0 - 80.0 %    Bands %, Manual 4.2 0.0 - 5.0 %    Lymphocytes %, Manual 12.6 13.0 - 44.0 %    Monocytes %, Manual 0.0 2.0 - 10.0 %    Eosinophils %, Manual 0.0 0.0 - 6.0 %    Basophils %, Manual 0.0 0.0 - 2.0 %    Atypical Lymphocytes %, Manual 7.6 0.0 - 2.0 %    Seg Neutrophils Absolute, Manual 7.48 (H) 1.60 - 5.00 x10*3/uL    Bands Absolute, Manual 0.42 0.00 - 0.50 x10*3/uL    Lymphocytes Absolute, Manual 1.25 0.80 - 3.00 x10*3/uL    Monocytes Absolute, Manual 0.00 (L) 0.05 - 0.80 x10*3/uL    Eosinophils Absolute, Manual 0.00 0.00 - 0.40 x10*3/uL    Basophils Absolute, Manual 0.00 0.00 - 0.10 x10*3/uL    Atypical Lymphs Absolute, Manual 0.75 (H) 0.00 - 0.30 x10*3/uL    Total Cells Counted 119     Neutrophils Absolute, Manual 7.90 (H) 1.60 - 5.50 x10*3/uL    RBC Morphology See Below     Ovalocytes Few     [place] Cells Many    POCT GLUCOSE   Result Value Ref Range    POCT Glucose 194 (H) 74 - 99 mg/dL        Assessment/Plan   Kerry Arevalo is a 84 y.o. female with a past medical history of HTN, T2DM, OA, RA, and spinal stenosis c/b lumbar radiculopathy presented on 6/7/2024 with slurred speech and altered mental status and was admitted for workup of confusion . Dermatology was consulted for rash.      Differential diagnoses: Drug Reaction with Eosinophilia and Systemic Symptoms (DRESS).     Impression:     Drug Reaction with Eosinophilia and Systemic Symptoms (DRESS)  is a potentially severe drug reaction with systemic manifestations including fever, rash, and internal organ involvement, most typically hepatitis.  The specific underlying mechanisms of this condition are unknown, and they likely vary between patients and specific drugs. Defects in the detoxification of anticonvulsants and sulfonamides have been demonstrated in patients with DRESS, with higher  prevalence in certain ethnic groups and human leukocyte antigen (HLA) types. HHV-6, HHV-7, EBV, and CMV reactivation has also been demonstrated in many of these patients, although the pathogenic role of this viral reactivation is yet to be determined.    Clinically, symptoms develop 2-8 weeks after initiation of the responsible drug. In Kerry, the suspected culprit is sulfasalazine which was started on 4/23/24 approximately 6.5 weeks prior to the development of her rash. Kerry's rash which consists of erythematous edematous papules coalescing into plaques is also classic for DRESS. Her elevated liver enzymes are also consistent with DRESS.  The liver is the most commonly and severely affected visceral site and cutaneous and visceral involvement may persist for several months after discontinuation of the offending drug.     It is imperative to withdraw the suspect medication(s) as soon as possible, as there is a 10% mortality associated with complications of organ inflammation, with liver injury being the most common cause of mortality and myocarditis the second most common.    Recommendations:  -Punch biopsy pending.   -Continue to hold sulfasalazine.    -Continue oral Prednisone 50mg daily.   -HHV-6, HHV-7, CMV, EBV pending.   -Continue to trend daily LFTS and CBC with differential   -Continue Triamcinolone 0.1% cream twice daily to rash areas ; please dispense 454g jar    The patient was seen and discussed with attending physician Dr. Brian. The assessment and plan was communicated to the care team.    Thank you for the consultation and for the opportunity to contribute to the care of this patient.      Lauryn Frausto,    PGY2, Dermatology  Epic chat (preferred)  Team pager [phone]     I saw and evaluated the patient. I personally obtained the key and critical portions of the history and physical exam or was physically present for key and critical portions performed by the resident. I reviewed the resident's  documentation and discussed the patient with the resident. I agree with the resident's medical decision making as documented in the note.    Aguilar Brian MD

## 2024-06-11 ENCOUNTER — APPOINTMENT (OUTPATIENT)
Dept: PRIMARY CARE | Facility: CLINIC | Age: 84
End: 2024-06-11
Payer: MEDICARE

## 2024-06-11 VITALS
HEIGHT: 55 IN | OXYGEN SATURATION: 99 % | DIASTOLIC BLOOD PRESSURE: 59 MMHG | SYSTOLIC BLOOD PRESSURE: 147 MMHG | RESPIRATION RATE: 16 BRPM | WEIGHT: 121.91 LBS | HEART RATE: 99 BPM | BODY MASS INDEX: 28.21 KG/M2 | TEMPERATURE: 97.9 F

## 2024-06-11 LAB
25(OH)D3 SERPL-MCNC: 86 NG/ML (ref 30–100)
ACANTHOCYTES BLD QL SMEAR: ABNORMAL
ALBUMIN SERPL BCP-MCNC: 2.7 G/DL (ref 3.4–5)
ALP SERPL-CCNC: 215 U/L (ref 33–136)
ALT SERPL W P-5'-P-CCNC: 130 U/L (ref 7–45)
ANION GAP SERPL CALC-SCNC: 17 MMOL/L (ref 10–20)
AST SERPL W P-5'-P-CCNC: 49 U/L (ref 9–39)
BASOPHILS # BLD MANUAL: 0 X10*3/UL (ref 0–0.1)
BASOPHILS NFR BLD MANUAL: 0 %
BILIRUB DIRECT SERPL-MCNC: 0.2 MG/DL (ref 0–0.3)
BILIRUB SERPL-MCNC: 0.7 MG/DL (ref 0–1.2)
BUN SERPL-MCNC: 12 MG/DL (ref 6–23)
BURR CELLS BLD QL SMEAR: ABNORMAL
CALCIUM SERPL-MCNC: 8.9 MG/DL (ref 8.6–10.6)
CHLORIDE SERPL-SCNC: 113 MMOL/L (ref 98–107)
CMV IGM SERPL-ACNC: <8 AU/ML
CO2 SERPL-SCNC: 19 MMOL/L (ref 21–32)
CREAT SERPL-MCNC: 0.67 MG/DL (ref 0.5–1.05)
EBV DNA SPEC NAA+PROBE-LOG#: NORMAL {LOG_COPIES}/ML
EGFRCR SERPLBLD CKD-EPI 2021: 87 ML/MIN/1.73M*2
EOSINOPHIL # BLD MANUAL: 0 X10*3/UL (ref 0–0.4)
EOSINOPHIL NFR BLD MANUAL: 0 %
ERYTHROCYTE [DISTWIDTH] IN BLOOD BY AUTOMATED COUNT: 14 % (ref 11.5–14.5)
GLUCOSE BLD MANUAL STRIP-MCNC: 111 MG/DL (ref 74–99)
GLUCOSE BLD MANUAL STRIP-MCNC: 113 MG/DL (ref 74–99)
GLUCOSE BLD MANUAL STRIP-MCNC: 210 MG/DL (ref 74–99)
GLUCOSE BLD MANUAL STRIP-MCNC: 212 MG/DL (ref 74–99)
GLUCOSE SERPL-MCNC: 87 MG/DL (ref 74–99)
HCT VFR BLD AUTO: 28.7 % (ref 36–46)
HGB BLD-MCNC: 9.1 G/DL (ref 12–16)
HUMAN HERPESVIRUS-6 PCR PLASMA: NOT DETECTED COPIES/ML
HUMAN HERPESVIRUS-7 PCR PLASMA: NOT DETECTED COPIES/ML
IMM GRANULOCYTES # BLD AUTO: 0.03 X10*3/UL (ref 0–0.5)
IMM GRANULOCYTES NFR BLD AUTO: 0.3 % (ref 0–0.9)
LABORATORY COMMENT REPORT: NORMAL
LABORATORY COMMENT REPORT: NOT DETECTED
LYMPHOCYTES # BLD MANUAL: 2.45 X10*3/UL (ref 0.8–3)
LYMPHOCYTES NFR BLD MANUAL: 22.9 %
MAGNESIUM SERPL-MCNC: 2.14 MG/DL (ref 1.6–2.4)
MCH RBC QN AUTO: 28 PG (ref 26–34)
MCHC RBC AUTO-ENTMCNC: 31.7 G/DL (ref 32–36)
MCV RBC AUTO: 88 FL (ref 80–100)
MONOCYTES # BLD MANUAL: 0.18 X10*3/UL (ref 0.05–0.8)
MONOCYTES NFR BLD MANUAL: 1.7 %
NEUTS SEG # BLD MANUAL: 8.07 X10*3/UL (ref 1.6–5)
NEUTS SEG NFR BLD MANUAL: 75.4 %
NRBC BLD-RTO: 0 /100 WBCS (ref 0–0)
OVALOCYTES BLD QL SMEAR: ABNORMAL
PATH REPORT.FINAL DX SPEC: NORMAL
PATH REPORT.GROSS SPEC: NORMAL
PATH REPORT.RELEVANT HX SPEC: NORMAL
PATH REPORT.TOTAL CANCER: NORMAL
PHOSPHATE SERPL-MCNC: 1.9 MG/DL (ref 2.5–4.9)
PLATELET # BLD AUTO: 192 X10*3/UL (ref 150–450)
POLYCHROMASIA BLD QL SMEAR: ABNORMAL
POTASSIUM SERPL-SCNC: 3.9 MMOL/L (ref 3.5–5.3)
PROT SERPL-MCNC: 5.2 G/DL (ref 6.4–8.2)
RBC # BLD AUTO: 3.25 X10*6/UL (ref 4–5.2)
RBC MORPH BLD: ABNORMAL
SODIUM SERPL-SCNC: 145 MMOL/L (ref 136–145)
TOTAL CELLS COUNTED BLD: 118
WBC # BLD AUTO: 10.7 X10*3/UL (ref 4.4–11.3)

## 2024-06-11 PROCEDURE — 2500000004 HC RX 250 GENERAL PHARMACY W/ HCPCS (ALT 636 FOR OP/ED): Performed by: STUDENT IN AN ORGANIZED HEALTH CARE EDUCATION/TRAINING PROGRAM

## 2024-06-11 PROCEDURE — 85007 BL SMEAR W/DIFF WBC COUNT: CPT

## 2024-06-11 PROCEDURE — 85027 COMPLETE CBC AUTOMATED: CPT

## 2024-06-11 PROCEDURE — 83735 ASSAY OF MAGNESIUM: CPT

## 2024-06-11 PROCEDURE — 1100000001 HC PRIVATE ROOM DAILY

## 2024-06-11 PROCEDURE — 84520 ASSAY OF UREA NITROGEN: CPT

## 2024-06-11 PROCEDURE — 2500000004 HC RX 250 GENERAL PHARMACY W/ HCPCS (ALT 636 FOR OP/ED)

## 2024-06-11 PROCEDURE — 2500000001 HC RX 250 WO HCPCS SELF ADMINISTERED DRUGS (ALT 637 FOR MEDICARE OP)

## 2024-06-11 PROCEDURE — 84100 ASSAY OF PHOSPHORUS: CPT

## 2024-06-11 PROCEDURE — 82947 ASSAY GLUCOSE BLOOD QUANT: CPT | Mod: 91

## 2024-06-11 PROCEDURE — 2500000002 HC RX 250 W HCPCS SELF ADMINISTERED DRUGS (ALT 637 FOR MEDICARE OP, ALT 636 FOR OP/ED)

## 2024-06-11 PROCEDURE — 84075 ASSAY ALKALINE PHOSPHATASE: CPT

## 2024-06-11 PROCEDURE — 36415 COLL VENOUS BLD VENIPUNCTURE: CPT

## 2024-06-11 PROCEDURE — 99231 SBSQ HOSP IP/OBS SF/LOW 25: CPT

## 2024-06-11 RX ORDER — OLANZAPINE 10 MG/2ML
2.5 INJECTION, POWDER, FOR SOLUTION INTRAMUSCULAR ONCE
Status: COMPLETED | OUTPATIENT
Start: 2024-06-11 | End: 2024-06-11

## 2024-06-11 RX ORDER — TRAZODONE HYDROCHLORIDE 50 MG/1
25 TABLET ORAL NIGHTLY
Status: DISCONTINUED | OUTPATIENT
Start: 2024-06-11 | End: 2024-06-13 | Stop reason: HOSPADM

## 2024-06-11 RX ADMIN — TRIAMCINOLONE ACETONIDE 1 APPLICATION: 1 OINTMENT TOPICAL at 20:22

## 2024-06-11 RX ADMIN — OLANZAPINE 2.5 MG: 10 INJECTION, POWDER, FOR SOLUTION INTRAMUSCULAR at 00:56

## 2024-06-11 RX ADMIN — LORATADINE 10 MG: 10 TABLET ORAL at 08:57

## 2024-06-11 RX ADMIN — AZELASTINE HYDROCHLORIDE 2 SPRAY: 137 SPRAY, METERED NASAL at 09:02

## 2024-06-11 RX ADMIN — Medication 1 TABLET: at 08:56

## 2024-06-11 RX ADMIN — Medication 1 TABLET: at 08:57

## 2024-06-11 RX ADMIN — TRAZODONE HYDROCHLORIDE 25 MG: 50 TABLET ORAL at 20:16

## 2024-06-11 RX ADMIN — MONTELUKAST 10 MG: 10 TABLET, FILM COATED ORAL at 08:57

## 2024-06-11 RX ADMIN — Medication 1 TABLET: at 20:16

## 2024-06-11 RX ADMIN — INSULIN LISPRO 4 UNITS: 100 INJECTION, SOLUTION INTRAVENOUS; SUBCUTANEOUS at 17:54

## 2024-06-11 RX ADMIN — TRIAMCINOLONE ACETONIDE: 1 OINTMENT TOPICAL at 09:02

## 2024-06-11 RX ADMIN — AZELASTINE HYDROCHLORIDE 2 SPRAY: 137 SPRAY, METERED NASAL at 21:00

## 2024-06-11 RX ADMIN — PREDNISONE 50 MG: 5 TABLET ORAL at 08:57

## 2024-06-11 ASSESSMENT — COGNITIVE AND FUNCTIONAL STATUS - GENERAL
EATING MEALS: A LITTLE
DRESSING REGULAR UPPER BODY CLOTHING: A LITTLE
TURNING FROM BACK TO SIDE WHILE IN FLAT BAD: A LITTLE
MOVING FROM LYING ON BACK TO SITTING ON SIDE OF FLAT BED WITH BEDRAILS: A LITTLE
DRESSING REGULAR LOWER BODY CLOTHING: A LITTLE

## 2024-06-11 ASSESSMENT — PAIN - FUNCTIONAL ASSESSMENT: PAIN_FUNCTIONAL_ASSESSMENT: 0-10

## 2024-06-11 ASSESSMENT — PAIN SCALES - GENERAL
PAINLEVEL_OUTOF10: 0 - NO PAIN
PAINLEVEL_OUTOF10: 0 - NO PAIN

## 2024-06-11 NOTE — DISCHARGE INSTRUCTIONS
Dear Kerry Arevalo,    You were admitted to [facility] from 6/7 to 6/13 for confusion. While you were here, you had elevated liver enzymes and a diffuse rash. When you were seen by dermatology, they performed a biopsy that confirmed that this was a drug reaction called DRESS.   The sulfasalazine was the medication that likely caused this reaction and is now listed as an allergy in your chart; however, please avoid using tizanidine as well just in case it also played a role. You were started on steroids for treatment that helped improve your rash. You will continue using these steroids and the triamcinolone cream twice daily until your follow up appointment with dermatology. These steroids also caused your blood sugar to be elevated. You will not need to use any medications at home for this as your blood sugars will normalize as the steroid doses are decreased. For your other home medications, please stop taking the pravastatin until you are able to get more blood work done that shows normalization of your liver function. Your liver enzymes were elevated as a result of this drug reaction but they are improving and close to being back to normal. Please get laboratory work completed at any  lab on 6/14 or 6/17 to follow your liver function. Dermatology will be in touch with you about your steroids and your follow up appointment. Other referrals were placed for you to follow up with physical therapy, rheumatology, podiatry, and your PCP.    Medication changes:  Start these medications: Prednisone, triamcinolone ointment  Stop these medications: SULFASALAZINE (this is now considered an allergy), tizanidine, pravastatin (you will be able to restart taking this when your liver function normalizes, please discuss this with your PCP)    Appointments/follow-up:  Referrals placed for podiatry appointment and outpatient physical therapy  Appointment requested for rheumatology follow up  Dermatology- They will schedule an  appointment for you in two weeks  Please have labs obtained on 6/14 or 6/17 at any  lab    You will be contacted to schedule your follow up appointments. If you do not hear from scheduling within 3-5 business days, please call [phone] to schedule.     It was a pleasure taking care of you and we wish you all the best with your recovery,    Your  Care Team

## 2024-06-11 NOTE — PROGRESS NOTES
Subjective     Interval events:  Pt agitated overnight, trying to get out of bed, biting/hitting staff and attempting to eat IV tubing. Received zyprexa x2. This morning, pt still complains of rash but denies any chest pain, abdominal pain, leg pain, weakness,numbness. Endorsed left-sided headache.       Objective     Vitals:    24 Hour Vitals  Temp:  [36.6 °C (97.9 °F)-36.8 °C (98.2 °F)] 36.8 °C (98.2 °F)  Heart Rate:  [100-112] 111  Resp:  [20] 20  BP: ()/(40-81) 155/70    Temp (24hrs), Av.7 °C (98.1 °F), Min:36.6 °C (97.9 °F), Max:36.8 °C (98.2 °F)     24 hour Intake/Output    Intake/Output Summary (Last 24 hours) at 2024 0659  Last data filed at 6/10/2024 1800  Gross per 24 hour   Intake 740 ml   Output --   Net 740 ml        Physical exam:  Constitutional: Thin female in no acute distress.  HEENT: NCAT. EOMI.   Respiratory: CTAB. No wheezes, crackles, or rhonchi. Normal respiratory effort on RA.  Cardiovascular: RRR, normal S1/S2, No murmurs, rubs, or gallops.   Abdominal: Soft, nondistended, nontender to palpation. No rebound or guarding  Neuro: CN II-XII grossly intact. Moving all extremities with no focal deficits. A&Ox2 to person, date, thought she was at  home.   MSK: WWP, R>L LE edema.   Skin: erathematous papules, improved edema  Psych: Appropriate mood and affect.      Medications   Scheduled Medications  azelastine, 2 spray, Each Nostril, BID  calcium carbonate-vitamin D3, 1 tablet, oral, BID  enoxaparin, 30 mg, subcutaneous, q24h ELYSE  insulin lispro, 0-10 Units, subcutaneous, TID  loratadine, 10 mg, oral, Daily  [Held by provider] losartan, 50 mg, oral, Daily  montelukast, 10 mg, oral, Daily  multivitamin with minerals iron-free, 1 tablet, oral, Daily  [Held by provider] pravastatin, 40 mg, oral, Daily  predniSONE, 50 mg, oral, Daily  [Held by provider] sulfaSALAzine, 1,000 mg, oral, Daily  triamcinolone, , Topical, BID     Continuous Medications    PRN Medications  PRN medications:  acetaminophen, acetaminophen, dextrose, diclofenac sodium, glucagon, polyethylene glycol       Labs  CBC  Results from last 72 hours   Lab Units 06/10/24  0818 06/09/24  1746   WBC AUTO x10*3/uL 9.9 6.4   HEMOGLOBIN g/dL 9.9* 9.9*   HEMATOCRIT % 29.1* 29.6*   PLATELETS AUTO x10*3/uL 215 194        BMP  Results from last 72 hours   Lab Units 06/10/24  0818 06/09/24  1746   SODIUM mmol/L 146* 143   POTASSIUM mmol/L 3.3* 3.2*   CHLORIDE mmol/L 110* 109*   BUN mg/dL 12 16   CREATININE mg/dL 0.59 0.88   MAGNESIUM mg/dL 2.52* 1.65   PHOSPHORUS mg/dL 3.4 1.6*       Imaging:  === 06/07/24 ===    US GALLBLADDER    - Impression -  1. Gallbladder sludge without sonographic evidence of acute  cholecystitis.  2. Few small simple cysts and calcified granuloma within the liver.    I personally reviewed the images/study and I agree with the findings  as stated by resident physician Dr. Yevgeniy Dong.    MACRO:  None      Signed by: Hossein Perry 6/8/2024 4:50 PM  Dictation workstation:   [id number]   === 06/07/24 ===    CT HEAD WO IV CONTRAST    - Impression -  1. No acute intracranial hemorrhage or mass effect.  2. Paranasal sinus mucosal thickening most prominently affecting the  right maxillary sinus. 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 ENT follow-up is recommended.      I personally reviewed the images/study and I agree with the findings  as stated by Resident Timothy Costa MD. This study was interpreted  at [facility], [place], [place].    MACRO:  None    Signed by: Albin Jonas 6/8/2024 12:17 AM  Dictation workstation:   [id number]          Assessment and plan:  Kerry Arevalo is a 84 y.o. female with PMHx of HTN, T2DM, OA, RA, and spinal stenosis c/b lumbar radiculopathy who presented to the ED with slurred speech and confusion.  Initially suspected to be medication induced (recently started on medrol at urgent care for back pain and family suspects  inappropriate usage by pt) vs less likely TIA (given recurrence of AMS and new rash).  Now also with concern for DRESS per Dermatology - given timeline of symptoms (pt had started sulfasalazine ~6 weeks prior to presentation) with urticarial rash, new transaminitis and altered mental status. Punch biopsy performed, but currently pursuing treatment with prednisone for DRESS pending results.     Updates 6/11/24  -Orthostats pending, ECG sinus tachycardia with PACs  -DVT US neg.   -Liver US with hepatic cysts and biliary sludge, no signs of cholecystitis  -CMV IgG pos, IgM pending  -EBV IgG pos, IgM neg  -Willl discuss dispo planning with pt's daughter. Pt skilled for SNF.       #AMS  #Urticaria  #C/f DRESS  ::Initially suspected to be medication induced (recently started on medrol at urgent care for back pain and family suspects inappropriate usage by pt) vs less likely TIA (recurrence of AMS and new rash).  :: No focal neuro deficits on evaluation in ED   :: CTH no acute findings, paranasal mucosal thickening   :: CXR without acute process  :: TSH wnl  :: S/p IV benadryl 50 mg IV and Famotidine 20 mg IV in the ED -> significant confusion after receiving benadryl, unclear if due to underlying process or due to benadryl sedative effect   :: Dermatology consulted for rash. Noted that given timeline of symptoms (pt had started sulfasalazine ~6 weeks prior to presentation), new transaminitis, rash and altered mental status, DRESS is a concern. Punch biopsy performed, but recommending treatment for DRESS pending results. No eosinophilia at this time.   :: UDS negative  - UA pending  - HOLD tizanidine, sulfasalazine  - Pred 50mg daily per Derm recs  - F/up punch biopsy  -CMV IgG pos, IgM pending  -EBV IgG pos, IgM neg  -Follow up HHV6/HHV7/EBV per derm recs     # Elevated Liver Enzymes  :: Hepatocellular pattern, with concern for DRESS (see above)  :: No RUQ abdominal pain  ::-Liver US with hepatic cysts and biliary sludge,  no signs of cholecystitis  -Trend LFt     # Anemia  :: Hgb 9.5 baseline ~12-13  :: No evidence of bleeding   :: Possibly secondary to sulfasalazine   - CTM         # HTN  :: BP soft on admission but after 1L LR pt was hypertensive SBP 150s when recheck in room   -held losartan iso orthostatic sx when working with PT     # Sinusitis  -C/w Azelastine spray   -C/w Montelukast 10 mg   - ENT follow up outpt     #Steroid induced hyperglycemia  ::HbA1c 5.8 on admission  -Start SSI      F : PRN  E: PRN  N: Regular      DVT prophylaxis: Lovenox subq     Code Status: DNR and No Intubation (confirmed on admission)   NOK: Kaitlin Hammonds (daughter) [phone]       Patient seen and discussed with attending physician.    Safia Abdullahi MD  PGY-1 Internal Medicine

## 2024-06-11 NOTE — CARE PLAN
Problem: Pain  Goal: My pain/discomfort is manageable  Outcome: Progressing     Problem: Safety  Goal: Patient will be injury free during hospitalization  Outcome: Progressing  Goal: I will remain free of falls  Outcome: Progressing     Problem: Daily Care  Goal: Daily care needs are met  Outcome: Progressing     Problem: Psychosocial Needs  Goal: Demonstrates ability to cope with hospitalization/illness  Outcome: Progressing  Goal: Collaborate with me, my family, and caregiver to identify my specific goals  Outcome: Progressing     Problem: Discharge Barriers  Goal: My discharge needs are met  Outcome: Progressing   The patient's goals for the shift include      The clinical goals for the shift include pt will remain safe throughout this shift

## 2024-06-11 NOTE — PROCEDURES
Transitional Care Coordination Progress Note:  Patient discussed during interdisciplinary rounds.  Team members present: MD, SANDY  Plan per Medical/Surgical team: Pt presented to ED with slurred speech and confusion, per medical team plan for punch biopsy, possible hospital delirium improving with steroids.  Payer: [employer]  Status: Inpatient  Discharge disposition: PT is recommending moderate intensity therapy post discharge, anticipate pt will discharge to SNF versus home with home PT/OT if agreeable.  Potential Barriers: none  ADOD: 6/13 versus 6/14  Attempted to reach daughter Kaitlin [phone] to complete discharge planning assessment and discuss dispo but received her voice mail. Left verbal message for a return call with my name and contact number. Care coordinator will continue to follow for discharge planning needs.    Pavel Mccray RN  Transitional Care Coordinator/TCC  [phone]

## 2024-06-11 NOTE — SIGNIFICANT EVENT
06/10/24 2203   Code Macie   Code Macie Initated by Gilma Vasquez   Pager Date 06/10/24   Pager Time [phone]   Individual Involved Patient   Location/Room [facility], [facility] 5568   Arrival Date 06/10/24   Arrival Time 2136   Visit Reason Confusion   Present at Code Nurse;EPAT;Supervisor;Patient care technician;Police services   Primary Reason for Call Aggressive/threatening behavior;Assaultive behavior;Elopement risk;Self-harming behavior;Non re-directable   Interventions 1:1 monitoring;Distraction;Escorted by police services;Initiation of team presence;Patient education;Placed in restraints;PRN medication;Verbal de-escalation;Reassurance of safety   Description of Event 84 year old female admitted for confusion combative today since family departed the hospital over 2 hours ago.  She was hitting, biting, and trying to pinch staff.  The patient was up and out of bed threatening to leave and very unsteady on her feet falling into the door resulting in the Code Violet call.  Patient was returned to her bed but continued to struggle with staff and unable to be directed.  She was reminded she was in the hospital for treatment and safe.  2 point soft restraints were needed as the patient continued trying to leave.  Medication to help her calm down is pending. Patient had soiled herself during the intervention with staff helping to change her and her bedding.  She was more cooperative and calming with staff attending to her needs.   PRN Medication Available No   Physical Contact To Patient;Staff   Plan and Interventions Going Forward Patient remains under 1:1 observation   Plan Reviewed with patient   Outcome Remained on division;1:1 monitoring;Seclusion/restraint   Event End Date 06/10/24   Event End Time 2147

## 2024-06-11 NOTE — CONSULTS
"Nutrition Initial Assessment:   Nutrition Assessment    Reason for Assessment: Admission nursing screening    Patient is a 84 y.o. female presenting with slurred speech and confusion.   Initially suspected to be medication induced (recently started on medrol at urgent care for back pain and family suspects inappropriate usage by pt) - additional c/f DRESS per Dermatology (started sulfasalazine ~6 weeks prior) with urticarial rash, new transaminitis and AMS.     PMHx of HTN, T2DM, OA, RA, and spinal stenosis c/b lumbar radiculopathy     Nutrition History:  Food and Nutrient History: Attempted to see pt yesterday and today multiple times - unsuccessful at each attempt.  Food Allergies/Intolerances:  None       Anthropometrics:  Height: 139.7 cm (4' 7\")   Weight: 55.3 kg (121 lb 14.6 oz)   BMI (Calculated): 28.34  IBW/kg (Dietitian Calculated): 40.9 kg  Percent of IBW: 135 %       Weight History:   Wt Readings from Last 10 Encounters:   06/09/24 55.3 kg (121 lb 14.6 oz)   05/20/24 54.4 kg (120 lb)   04/23/24 55.8 kg (123 lb)   02/20/24 59.9 kg (132 lb)   12/22/23 56.2 kg (124 lb)   10/30/23 55.8 kg (123 lb)   09/08/23 55.8 kg (123 lb)   07/31/23 55.8 kg (123 lb)   05/25/23 54.9 kg (121 lb)   03/28/23 54.4 kg (120 lb)       Weight Change %:  Significant Weight Loss: No    Nutrition Focused Physical Exam Findings:  defer: unable to meet with pt upon multiple attempts     Nutrition Significant Labs:  CBC Trend:   Results from last 7 days   Lab Units 06/11/24  0916 06/10/24  0818 06/09/24  1746 06/07/24  2136   WBC AUTO x10*3/uL 10.7 9.9 6.4 6.9   RBC AUTO x10*6/uL 3.25* 3.48* 3.49* 3.27*   HEMOGLOBIN g/dL 9.1* 9.9* 9.9* 9.5*   HEMATOCRIT % 28.7* 29.1* 29.6* 26.3*   MCV fL 88 84 85 80   PLATELETS AUTO x10*3/uL 192 215 194 184    , BMP Trend:   Results from last 7 days   Lab Units 06/11/24  0916 06/10/24  0818 06/09/24  1746 06/07/24  2136   GLUCOSE mg/dL 87 113* 307* 120*   CALCIUM mg/dL 8.9 9.3 9.3 9.5   SODIUM mmol/L 145 " 146* 143 140   POTASSIUM mmol/L 3.9 3.3* 3.2* 3.9   CO2 mmol/L 19* 27 21 20*   CHLORIDE mmol/L 113* 110* 109* 109*   BUN mg/dL 12 12 16 20   CREATININE mg/dL 0.67 0.59 0.88 1.03    , A1C:  Lab Results   Component Value Date    HGBA1C 5.8 (H) 10/30/2023   , BG POCT trend:   Results from last 7 days   Lab Units 06/11/24  1143 06/11/24  0811 06/10/24  1844 06/10/24  1211 06/10/24  0743   POCT GLUCOSE mg/dL 113* 111* 129* 194* 120*    , Renal Lab Trend:   Results from last 7 days   Lab Units 06/11/24  0916 06/10/24  0818 06/09/24  1746 06/07/24  2136   POTASSIUM mmol/L 3.9 3.3* 3.2* 3.9   PHOSPHORUS mg/dL 1.9* 3.4 1.6* 2.1*   SODIUM mmol/L 145 146* 143 140   MAGNESIUM mg/dL 2.14 2.52* 1.65 1.80   EGFR mL/min/1.73m*2 87 90 65 54*   BUN mg/dL 12 12 16 20   CREATININE mg/dL 0.67 0.59 0.88 1.03    , Vit D:   Lab Results   Component Value Date    VITD25 66 10/30/2023        Nutrition Specific Medications:  Scheduled medications  calcium carbonate-vitamin D3, 1 tablet, oral, BID  insulin lispro, 0-10 Units, subcutaneous, TID  multivitamin with minerals iron-free, 1 tablet, oral, Daily  predniSONE, 50 mg, oral, Daily    I/O:   Last BM Date: 06/10/24; Stool Appearance: Soft (06/10/24 2030)    Dietary Orders (From admission, onward)       Start     Ordered    06/08/24 0554  Adult diet Regular  Diet effective now        Question:  Diet type  Answer:  Regular    06/08/24 0553         Estimated Needs:   Total Energy Estimated Needs (kCal): 1400 kCal  Method for Estimating Needs: 25kcal/kg per act wt  Total Protein Estimated Needs (g):  (50-65)  Method for Estimating Needs: ~1.2g/kg per IBW vs act wt  Total Fluid Estimated Needs (mL):  (per team)        Nutrition Diagnosis   Malnutrition Diagnosis  Patient has Malnutrition Diagnosis:  (need to obtain further info to accurately determine)    Nutrition Diagnosis  Patient has Nutrition Diagnosis: Yes  Diagnosis Status (1): New  Nutrition Diagnosis 1: Increased nutrient needs  Related  to (1): increased metabolic demand  As Evidenced by (1): c/f DRESS       Nutrition Interventions/Recommendations     Continue regular diet as tolereted.     Weekly weights to track trend.     Continue to monitor phos and replete as needed (periodically low).     Recommend to check current 25(OH)D level as last value was 66 (adjust calcium with D supplementation if level is still >50).     If appetite/PO intake appears inadequate, then please order Ensure Plus daily (this service will continue to follow to determine if further nutrition interventions are warranted       Nutrition Monitoring and Evaluation   Food/Nutrient Related History Monitoring  Monitoring and Evaluation Plan: Energy intake  Criteria: If pt is meeting needs on follow up and RDN finds no concern for malnutrition, then likely no further follow up warranted. Will follow until this information can be obtained.          Time Spent/Follow-up Reminder:   Time Spent (min): 90 minutes  Last Date of Nutrition Visit: 06/11/24  Nutrition Follow-Up Needed?: Dietitian to reassess per policy  Follow up Comment: Need to obtain more info to determine nutrition status

## 2024-06-12 LAB
ALBUMIN SERPL BCP-MCNC: 2.9 G/DL (ref 3.4–5)
ALP SERPL-CCNC: 211 U/L (ref 33–136)
ALT SERPL W P-5'-P-CCNC: 94 U/L (ref 7–45)
ANION GAP SERPL CALC-SCNC: 14 MMOL/L (ref 10–20)
AST SERPL W P-5'-P-CCNC: 40 U/L (ref 9–39)
ATRIAL RATE: 105 BPM
BACTERIA BLD CULT: NORMAL
BACTERIA BLD CULT: NORMAL
BASOPHILS # BLD MANUAL: 0 X10*3/UL (ref 0–0.1)
BASOPHILS NFR BLD MANUAL: 0 %
BILIRUB DIRECT SERPL-MCNC: 0.2 MG/DL (ref 0–0.3)
BILIRUB SERPL-MCNC: 0.7 MG/DL (ref 0–1.2)
BUN SERPL-MCNC: 12 MG/DL (ref 6–23)
BURR CELLS BLD QL SMEAR: ABNORMAL
CALCIUM SERPL-MCNC: 9.4 MG/DL (ref 8.6–10.6)
CHLORIDE SERPL-SCNC: 114 MMOL/L (ref 98–107)
CO2 SERPL-SCNC: 24 MMOL/L (ref 21–32)
CREAT SERPL-MCNC: 0.64 MG/DL (ref 0.5–1.05)
EGFRCR SERPLBLD CKD-EPI 2021: 88 ML/MIN/1.73M*2
EOSINOPHIL # BLD MANUAL: 0 X10*3/UL (ref 0–0.4)
EOSINOPHIL NFR BLD MANUAL: 0 %
ERYTHROCYTE [DISTWIDTH] IN BLOOD BY AUTOMATED COUNT: 13.7 % (ref 11.5–14.5)
GLUCOSE BLD MANUAL STRIP-MCNC: 106 MG/DL (ref 74–99)
GLUCOSE BLD MANUAL STRIP-MCNC: 169 MG/DL (ref 74–99)
GLUCOSE BLD MANUAL STRIP-MCNC: 206 MG/DL (ref 74–99)
GLUCOSE SERPL-MCNC: 117 MG/DL (ref 74–99)
HCT VFR BLD AUTO: 30.3 % (ref 36–46)
HGB BLD-MCNC: 10 G/DL (ref 12–16)
IMM GRANULOCYTES # BLD AUTO: 0.05 X10*3/UL (ref 0–0.5)
IMM GRANULOCYTES NFR BLD AUTO: 0.3 % (ref 0–0.9)
LYMPHOCYTES # BLD MANUAL: 3.26 X10*3/UL (ref 0.8–3)
LYMPHOCYTES NFR BLD MANUAL: 20 %
MAGNESIUM SERPL-MCNC: 2.12 MG/DL (ref 1.6–2.4)
MCH RBC QN AUTO: 28 PG (ref 26–34)
MCHC RBC AUTO-ENTMCNC: 33 G/DL (ref 32–36)
MCV RBC AUTO: 85 FL (ref 80–100)
MONOCYTES # BLD MANUAL: 0.57 X10*3/UL (ref 0.05–0.8)
MONOCYTES NFR BLD MANUAL: 3.5 %
NEUTS SEG # BLD MANUAL: 11.9 X10*3/UL (ref 1.6–5)
NEUTS SEG NFR BLD MANUAL: 73 %
NRBC BLD-RTO: 0 /100 WBCS (ref 0–0)
P AXIS: 53 DEGREES
P OFFSET: 214 MS
P ONSET: 161 MS
PHOSPHATE SERPL-MCNC: 2.1 MG/DL (ref 2.5–4.9)
PLATELET # BLD AUTO: 261 X10*3/UL (ref 150–450)
POTASSIUM SERPL-SCNC: 4.1 MMOL/L (ref 3.5–5.3)
PR INTERVAL: 122 MS
PROT SERPL-MCNC: 5.1 G/DL (ref 6.4–8.2)
Q ONSET: 222 MS
QRS COUNT: 17 BEATS
QRS DURATION: 76 MS
QT INTERVAL: 320 MS
QTC CALCULATION(BAZETT): 422 MS
QTC FREDERICIA: 385 MS
R AXIS: 36 DEGREES
RBC # BLD AUTO: 3.57 X10*6/UL (ref 4–5.2)
RBC MORPH BLD: ABNORMAL
SODIUM SERPL-SCNC: 148 MMOL/L (ref 136–145)
T AXIS: 15 DEGREES
T OFFSET: 382 MS
TOTAL CELLS COUNTED BLD: 115
VARIANT LYMPHS # BLD MANUAL: 0.57 X10*3/UL (ref 0–0.3)
VARIANT LYMPHS NFR BLD: 3.5 %
VENTRICULAR RATE: 105 BPM
WBC # BLD AUTO: 16.3 X10*3/UL (ref 4.4–11.3)

## 2024-06-12 PROCEDURE — 83735 ASSAY OF MAGNESIUM: CPT | Performed by: STUDENT IN AN ORGANIZED HEALTH CARE EDUCATION/TRAINING PROGRAM

## 2024-06-12 PROCEDURE — 2500000004 HC RX 250 GENERAL PHARMACY W/ HCPCS (ALT 636 FOR OP/ED)

## 2024-06-12 PROCEDURE — 85027 COMPLETE CBC AUTOMATED: CPT | Performed by: STUDENT IN AN ORGANIZED HEALTH CARE EDUCATION/TRAINING PROGRAM

## 2024-06-12 PROCEDURE — 2500000001 HC RX 250 WO HCPCS SELF ADMINISTERED DRUGS (ALT 637 FOR MEDICARE OP)

## 2024-06-12 PROCEDURE — 82374 ASSAY BLOOD CARBON DIOXIDE: CPT | Performed by: STUDENT IN AN ORGANIZED HEALTH CARE EDUCATION/TRAINING PROGRAM

## 2024-06-12 PROCEDURE — 84100 ASSAY OF PHOSPHORUS: CPT | Performed by: STUDENT IN AN ORGANIZED HEALTH CARE EDUCATION/TRAINING PROGRAM

## 2024-06-12 PROCEDURE — 1100000001 HC PRIVATE ROOM DAILY

## 2024-06-12 PROCEDURE — 82947 ASSAY GLUCOSE BLOOD QUANT: CPT

## 2024-06-12 PROCEDURE — 2500000004 HC RX 250 GENERAL PHARMACY W/ HCPCS (ALT 636 FOR OP/ED): Performed by: STUDENT IN AN ORGANIZED HEALTH CARE EDUCATION/TRAINING PROGRAM

## 2024-06-12 PROCEDURE — 99233 SBSQ HOSP IP/OBS HIGH 50: CPT | Performed by: STUDENT IN AN ORGANIZED HEALTH CARE EDUCATION/TRAINING PROGRAM

## 2024-06-12 PROCEDURE — 2500000002 HC RX 250 W HCPCS SELF ADMINISTERED DRUGS (ALT 637 FOR MEDICARE OP, ALT 636 FOR OP/ED)

## 2024-06-12 PROCEDURE — 36415 COLL VENOUS BLD VENIPUNCTURE: CPT | Performed by: STUDENT IN AN ORGANIZED HEALTH CARE EDUCATION/TRAINING PROGRAM

## 2024-06-12 PROCEDURE — 84075 ASSAY ALKALINE PHOSPHATASE: CPT

## 2024-06-12 PROCEDURE — 85007 BL SMEAR W/DIFF WBC COUNT: CPT | Performed by: STUDENT IN AN ORGANIZED HEALTH CARE EDUCATION/TRAINING PROGRAM

## 2024-06-12 PROCEDURE — 82248 BILIRUBIN DIRECT: CPT

## 2024-06-12 PROCEDURE — 99232 SBSQ HOSP IP/OBS MODERATE 35: CPT

## 2024-06-12 RX ADMIN — TRAZODONE HYDROCHLORIDE 25 MG: 50 TABLET ORAL at 21:09

## 2024-06-12 RX ADMIN — Medication 1 TABLET: at 08:14

## 2024-06-12 RX ADMIN — LOSARTAN POTASSIUM 50 MG: 25 TABLET, FILM COATED ORAL at 09:00

## 2024-06-12 RX ADMIN — Medication 1 TABLET: at 21:09

## 2024-06-12 RX ADMIN — PREDNISONE 50 MG: 5 TABLET ORAL at 08:14

## 2024-06-12 RX ADMIN — TRIAMCINOLONE ACETONIDE: 1 OINTMENT TOPICAL at 21:09

## 2024-06-12 RX ADMIN — ENOXAPARIN SODIUM 30 MG: 30 INJECTION SUBCUTANEOUS at 08:13

## 2024-06-12 RX ADMIN — AZELASTINE HYDROCHLORIDE 2 SPRAY: 137 SPRAY, METERED NASAL at 08:13

## 2024-06-12 RX ADMIN — INSULIN LISPRO 2 UNITS: 100 INJECTION, SOLUTION INTRAVENOUS; SUBCUTANEOUS at 13:18

## 2024-06-12 RX ADMIN — AZELASTINE HYDROCHLORIDE 2 SPRAY: 137 SPRAY, METERED NASAL at 21:09

## 2024-06-12 RX ADMIN — TRIAMCINOLONE ACETONIDE: 1 OINTMENT TOPICAL at 08:13

## 2024-06-12 RX ADMIN — LORATADINE 10 MG: 10 TABLET ORAL at 08:13

## 2024-06-12 RX ADMIN — MONTELUKAST 10 MG: 10 TABLET, FILM COATED ORAL at 08:14

## 2024-06-12 RX ADMIN — Medication 1 TABLET: at 10:52

## 2024-06-12 ASSESSMENT — COGNITIVE AND FUNCTIONAL STATUS - GENERAL
TURNING FROM BACK TO SIDE WHILE IN FLAT BAD: A LITTLE
CLIMB 3 TO 5 STEPS WITH RAILING: A LOT
DRESSING REGULAR UPPER BODY CLOTHING: A LITTLE
STANDING UP FROM CHAIR USING ARMS: A LITTLE
CLIMB 3 TO 5 STEPS WITH RAILING: A LOT
TOILETING: A LITTLE
DAILY ACTIVITIY SCORE: 16
EATING MEALS: A LITTLE
HELP NEEDED FOR BATHING: A LITTLE
MOVING TO AND FROM BED TO CHAIR: A LOT
MOVING TO AND FROM BED TO CHAIR: A LITTLE
WALKING IN HOSPITAL ROOM: A LOT
DRESSING REGULAR UPPER BODY CLOTHING: A LITTLE
STANDING UP FROM CHAIR USING ARMS: A LOT
PERSONAL GROOMING: A LITTLE
TOILETING: A LITTLE
DRESSING REGULAR LOWER BODY CLOTHING: A LITTLE
DRESSING REGULAR LOWER BODY CLOTHING: A LOT
MOBILITY SCORE: 18
MOBILITY SCORE: 15
HELP NEEDED FOR BATHING: A LOT
DAILY ACTIVITIY SCORE: 20
WALKING IN HOSPITAL ROOM: A LOT

## 2024-06-12 ASSESSMENT — PAIN SCALES - GENERAL
PAINLEVEL_OUTOF10: 0 - NO PAIN
PAINLEVEL_OUTOF10: 0 - NO PAIN

## 2024-06-12 ASSESSMENT — PAIN - FUNCTIONAL ASSESSMENT: PAIN_FUNCTIONAL_ASSESSMENT: 0-10

## 2024-06-12 ASSESSMENT — ACTIVITIES OF DAILY LIVING (ADL): LACK_OF_TRANSPORTATION: NO

## 2024-06-12 NOTE — PROGRESS NOTES
06/12/24 1426   Discharge Planning   Living Arrangements Children   Support Systems Children   Assistance Needed cooking   Type of Residence Private residence   Number of Stairs to Enter Residence 4  (4-5)   Number of Stairs Within Residence 0   Do you have animals or pets at home? No   Who is requesting discharge planning? Provider   Home or Post Acute Services None  (pt refusing SNF and home PT/OT at this time)   Patient expects to be discharged to: Home   Does the patient need discharge transport arranged? No  (family to provide)   Financial Resource Strain   How hard is it for you to pay for the very basics like food, housing, medical care, and heating? Not hard   Housing Stability   In the last 12 months, was there a time when you were not able to pay the mortgage or rent on time? N   In the last 12 months, how many places have you lived? 1   In the last 12 months, was there a time when you did not have a steady place to sleep or slept in a shelter (including now)? N   Transportation Needs   In the past 12 months, has lack of transportation kept you from medical appointments or from getting medications? no   In the past 12 months, has lack of transportation kept you from meetings, work, or from getting things needed for daily living? No     Met with pt and introduced myself as care coordinator with Care Transitions Team for discharge planning. Daughter Kaitlin was present at bedside during discharge planning assessment interview and assisted with some of the questions. Pt lives at home with daughter Aruna in a Dual residence home, Aruna lives upstairs and pt lives downstairs. Pt is mostly independent with ADL's/some iADL's including preparing her own meds. Family (usually daughter Kaitlin) assists her with cooking. Pt uses a cane for assistance with ambulation. Pt stated she feels safe at home. Pt stated she sometimes stumbles but denies any falls. Pt's address, phone number, and contact information was  verified. Pt denies any social or financial concerns at this time.    Home care: none.  DME: cane, transport chair (belonged to ), shower chair, (pt said she does not have a history of DM2 so no diabetic supplies at home).  : none.  PCP: Ivy Potts MD    Last appt: Pt stated she was supposed to see PCP yesterday, she spoke with provider on Monday and was told to call to reschedule after discharge from hospital.  Transport to appts: Pt stated one of her children provides.  Pharm: [facility] - mail order (denies issues affording/obtaining medications)    Discharge Planning: Per medical team awaiting plan from dermatology team, anticipated discharge date is 6/13 versus 6/14. Pt/ daughter Kaitlin aware PT is recommending moderate intensity therapy, educated regarding the difference between moderate/low intensity therapy, but declining SNF and home PT/OT at this time. Pt stated the issues she is having is cyst collection in the bottom of her feet making ambulation difficult, therefore, therapy will not assist with mobility. Pt would like a Podiatry consult and once cysts are resolved she would be open to having therapy re-assess her prior to discharge. Medical team updated of above. Care coordinator will continue to follow for discharge planning needs.    Pavel Mccray RN  Transitional Care Coordinator/[facility]  [id number]

## 2024-06-12 NOTE — PROGRESS NOTES
DERMATOLOGY CONSULT PROGRESS NOTE  Name: Kerry Arevalo  MRN: [id number]  : 1940    Subjective   Patient reports rash is improved from yesterday, but is still itchy.      Objective    Vitals:    24 1046 24 1047 24 1419 24   BP: 155/87 154/86 130/90 141/73   BP Location:   Right arm    Patient Position: Sitting Standing Sitting    Pulse:  (!) 131 (!) 118 98   Resp:   18 18   Temp:   36.1 °C (97 °F) 37 °C (98.6 °F)   TempSrc:   Temporal    SpO2:   99% 94%   Weight:       Height:          Exam    Physical Exam:   GEN: no acute distress  NEURO: moving all extremities   EYES: conjunctiva and eyelids normal. No conjunctival injection or erosions appreciated  ENT:   - Lips: normal  - Teeth/gums: normal  NECK: normal and symmetric.   CV: no varicosities, warmth or tenderness of extremities.  GI: Flat abdomen. Non-tender.   LYMPH: no LAD   EXTREMITIES: no distal digital clubbing, cyanosis, petechiae   SKIN: A full body skin exam including scalp, face, eyes, ears, neck, trunk, bilateral upper & lower extremities, toenails and fingernails were examined with the following findings:  -Involving the face, ears, trunk, and bilateral upper and lower extremities, there are erythematous to violaceous patches.   -Facial and bilateral plantar feet with edema.     Medications:  Scheduled Meds: azelastine, 2 spray, Each Nostril, BID  calcium carbonate-vitamin D3, 1 tablet, oral, BID  enoxaparin, 30 mg, subcutaneous, q24h ELYSE  insulin lispro, 0-10 Units, subcutaneous, TID  loratadine, 10 mg, oral, Daily  losartan, 50 mg, oral, Daily  montelukast, 10 mg, oral, Daily  multivitamin with minerals iron-free, 1 tablet, oral, Daily  [Held by provider] pravastatin, 40 mg, oral, Daily  predniSONE, 50 mg, oral, Daily  traZODone, 25 mg, oral, Nightly  triamcinolone, , Topical, BID       Continuous Infusions:     PRN Meds: PRN medications: acetaminophen, acetaminophen, dextrose, diclofenac sodium, glucagon,  polyethylene glycol     Results:  Results for orders placed or performed during the hospital encounter of 06/07/24 (from the past 24 hour(s))   POCT GLUCOSE   Result Value Ref Range    POCT Glucose 106 (H) 74 - 99 mg/dL   CBC and Auto Differential   Result Value Ref Range    WBC 16.3 (H) 4.4 - 11.3 x10*3/uL    nRBC 0.0 0.0 - 0.0 /100 WBCs    RBC 3.57 (L) 4.00 - 5.20 x10*6/uL    Hemoglobin 10.0 (L) 12.0 - 16.0 g/dL    Hematocrit 30.3 (L) 36.0 - 46.0 %    MCV 85 80 - 100 fL    MCH 28.0 26.0 - 34.0 pg    MCHC 33.0 32.0 - 36.0 g/dL    RDW 13.7 11.5 - 14.5 %    Platelets 261 150 - 450 x10*3/uL    Immature Granulocytes %, Automated 0.3 0.0 - 0.9 %    Immature Granulocytes Absolute, Automated 0.05 0.00 - 0.50 x10*3/uL   Magnesium   Result Value Ref Range    Magnesium 2.12 1.60 - 2.40 mg/dL   Hepatic Function Panel   Result Value Ref Range    Albumin 2.9 (L) 3.4 - 5.0 g/dL    Bilirubin, Total 0.7 0.0 - 1.2 mg/dL    Bilirubin, Direct 0.2 0.0 - 0.3 mg/dL    Alkaline Phosphatase 211 (H) 33 - 136 U/L    ALT 94 (H) 7 - 45 U/L    AST 40 (H) 9 - 39 U/L    Total Protein 5.1 (L) 6.4 - 8.2 g/dL   Phosphorus   Result Value Ref Range    Phosphorus 2.1 (L) 2.5 - 4.9 mg/dL   Basic Metabolic Panel   Result Value Ref Range    Glucose 117 (H) 74 - 99 mg/dL    Sodium 148 (H) 136 - 145 mmol/L    Potassium 4.1 3.5 - 5.3 mmol/L    Chloride 114 (H) 98 - 107 mmol/L    Bicarbonate 24 21 - 32 mmol/L    Anion Gap 14 10 - 20 mmol/L    Urea Nitrogen 12 6 - 23 mg/dL    Creatinine 0.64 0.50 - 1.05 mg/dL    eGFR 88 >60 mL/min/1.73m*2    Calcium 9.4 8.6 - 10.6 mg/dL   Manual Differential   Result Value Ref Range    Neutrophils %, Manual 73.0 40.0 - 80.0 %    Lymphocytes %, Manual 20.0 13.0 - 44.0 %    Monocytes %, Manual 3.5 2.0 - 10.0 %    Eosinophils %, Manual 0.0 0.0 - 6.0 %    Basophils %, Manual 0.0 0.0 - 2.0 %    Atypical Lymphocytes %, Manual 3.5 0.0 - 2.0 %    Seg Neutrophils Absolute, Manual 11.90 (H) 1.60 - 5.00 x10*3/uL    Lymphocytes Absolute,  Manual 3.26 (H) 0.80 - 3.00 x10*3/uL    Monocytes Absolute, Manual 0.57 0.05 - 0.80 x10*3/uL    Eosinophils Absolute, Manual 0.00 0.00 - 0.40 x10*3/uL    Basophils Absolute, Manual 0.00 0.00 - 0.10 x10*3/uL    Atypical Lymphs Absolute, Manual 0.57 (H) 0.00 - 0.30 x10*3/uL    Total Cells Counted 115     RBC Morphology See Below     Ennis Cells Few    POCT GLUCOSE   Result Value Ref Range    POCT Glucose 169 (H) 74 - 99 mg/dL   POCT GLUCOSE   Result Value Ref Range    POCT Glucose 206 (H) 74 - 99 mg/dL        Assessment/Plan   Kerry Arevalo is a 84 y.o. female with a past medical history of HTN, T2DM, OA, RA, and spinal stenosis c/b lumbar radiculopathy presented on 6/7/2024 with slurred speech and altered mental status and was admitted for workup of confusion . Dermatology was consulted for rash.         Differential diagnoses: Drug Reaction with Eosinophilia and Systemic Symptoms (DRESS) 2/2 sulfasalazine.     Impression:    Drug Reaction with Eosinophilia and Systemic Symptoms (DRESS)  is a potentially severe drug reaction with systemic manifestations including fever, rash, and internal organ involvement, most typically hepatitis.  The specific underlying mechanisms of this condition are unknown, and they likely vary between patients and specific drugs. Defects in the detoxification of anticonvulsants and sulfonamides have been demonstrated in patients with DRESS, with higher prevalence in certain ethnic groups and human leukocyte antigen (HLA) types. HHV-6, HHV-7, EBV, and CMV reactivation has also been demonstrated in many of these patients, although the pathogenic role of this viral reactivation is yet to be determined.     Clinically, symptoms develop 2-8 weeks after initiation of the responsible drug. In Kerry, the suspected culprit is sulfasalazine which was started on 4/23/24 approximately 6.5 weeks prior to the development of her rash. Kerry's initial presentation revealed erythematous edematous papules  coalescing into plaques which is also classic for DRESS. Her elevated liver enzymes are also consistent with DRESS.  The liver is the most commonly and severely affected visceral site and cutaneous and visceral involvement may persist for several months after discontinuation of the offending drug.      It is imperative to withdraw the suspect medication(s) as soon as possible, as there is a 10% mortality associated with complications of organ inflammation, with liver injury being the most common cause of mortality and myocarditis the second most common.    Kerry's rash has improved on oral prednisone 50mg and topical triamcinolone 0.1% ointment. Significantly less edema noted on 6/12, lesions have evolved into erythematous patches. Liver enzymes have continued to trend down.  No acute infection with CMV or EBV. HHV-6 and HHV-7 PCR negative.      Recommendations:  -Please add sulfasalazine to patient's allergy list.   -Discussed biopsy results with patient today.    -Decrease prednisone to 40mg daily.   -Continue triamcinolone 0.1% ointment twice daily to affected areas for 2 weeks. Avoid face, groin, or axillae. Please dispense 454g jar upon discharge.  -Patient to stop by lab for CBC w/ diff and CMP Friday 6/14/24.   -Dermatology team to arrange follow up appointment in 2 weeks.   -Recommend outpatient follow up with rheumatology to discuss alternative agents for treatment of her rheumatoid arthritis.     The patient was seen and discussed with attending physician Dr. Brian. The assessment and plan was communicated to the care team.    Thank you for the consultation and for the opportunity to contribute to the care of this patient.      Lauryn Frausto,    PGY2, Dermatology  Epic chat (preferred)  Team pager [phone]     I saw and evaluated the patient. I personally obtained the key and critical portions of the history and physical exam or was physically present for key and critical portions performed by the  resident. I reviewed the resident's documentation and discussed the patient with the resident. I agree with the resident's medical decision making as documented in the note.    Aguilar Brian MD

## 2024-06-12 NOTE — CARE PLAN
Problem: Pain  Goal: My pain/discomfort is manageable  Outcome: Progressing     Problem: Safety  Goal: Patient will be injury free during hospitalization  Outcome: Progressing  Goal: I will remain free of falls  Outcome: Progressing     Problem: Daily Care  Goal: Daily care needs are met  Outcome: Progressing     Problem: Psychosocial Needs  Goal: Demonstrates ability to cope with hospitalization/illness  Outcome: Progressing  Goal: Collaborate with me, my family, and caregiver to identify my specific goals  Outcome: Progressing     Problem: Discharge Barriers  Goal: My discharge needs are met  Outcome: Progressing     Problem: Skin  Goal: Prevent/manage excess moisture  Outcome: Progressing  Goal: Prevent/minimize sheer/friction injuries  Outcome: Progressing  Goal: Promote/optimize nutrition  Outcome: Progressing  Goal: Promote skin healing  Outcome: Progressing   The patient's goals for the shift include      The clinical goals for the shift include pt will remain safe throughout this shift

## 2024-06-12 NOTE — CARE PLAN
Problem: Pain  Goal: My pain/discomfort is manageable  6/11/2024 2115 by Gilma Vasquez RN  Outcome: Progressing  6/11/2024 2115 by Gilma Vasquez RN  Outcome: Progressing     Problem: Safety  Goal: Patient will be injury free during hospitalization  6/11/2024 2115 by Gilma Vasquez RN  Outcome: Progressing  6/11/2024 2115 by Gilma Vasquez RN  Outcome: Progressing  Goal: I will remain free of falls  6/11/2024 2115 by Gilma Vasquez RN  Outcome: Progressing  6/11/2024 2115 by Gilma Vasquez RN  Outcome: Progressing     Problem: Daily Care  Goal: Daily care needs are met  6/11/2024 2115 by Gilma Vasquez RN  Outcome: Progressing  6/11/2024 2115 by Gilma Vasquez RN  Outcome: Progressing     Problem: Psychosocial Needs  Goal: Demonstrates ability to cope with hospitalization/illness  6/11/2024 2115 by Gilma Vasquez RN  Outcome: Progressing  6/11/2024 2115 by iGlma Vasquez RN  Outcome: Progressing  Goal: Collaborate with me, my family, and caregiver to identify my specific goals  6/11/2024 2115 by Gilma Vasquez RN  Outcome: Progressing  6/11/2024 2115 by Gilma Vasquez RN  Outcome: Progressing     Problem: Discharge Barriers  Goal: My discharge needs are met  6/11/2024 2115 by Gilma Vasquez RN  Outcome: Progressing  6/11/2024 2115 by Gilma Vasquez RN  Outcome: Progressing     Problem: Skin  Goal: Prevent/manage excess moisture  6/11/2024 2115 by Gilma Vasquez RN  Outcome: Progressing  6/11/2024 2115 by Gilma Vasquez RN  Outcome: Progressing  Goal: Prevent/minimize sheer/friction injuries  6/11/2024 2115 by Gilma Vasquez RN  Outcome: Progressing  6/11/2024 2115 by Gilma Vasquez RN  Outcome: Progressing  Goal: Promote/optimize nutrition  6/11/2024 2115 by Gilma Vasquez RN  Outcome: Progressing  6/11/2024 2115 by Gilma Vasquez RN  Outcome: Progressing  Goal: Promote skin healing  6/11/2024 2115 by Gilma Vasquez RN  Outcome: Progressing  6/11/2024 2115 by Gilma Vasquez RN  Outcome: Progressing   The patient's goals for the shift  include      The clinical goals for the shift include pt will remain safe throughout this shift

## 2024-06-12 NOTE — PROGRESS NOTES
Subjective     Interval events:  Pt slept well ovn. States that rash is improving. Wants to go home but denies any other complaints like fevers, chills, abdominal pain, chest pain.        Objective     Vitals:    24 Hour Vitals  Temp:  [36.6 °C (97.9 °F)-36.9 °C (98.4 °F)] 36.6 °C (97.9 °F)  Heart Rate:  [] 131  Resp:  [16-18] 18  BP: (131-155)/(59-87) 154/86    Temp (24hrs), Av.8 °C (98.2 °F), Min:36.6 °C (97.9 °F), Max:36.9 °C (98.4 °F)     24 hour Intake/Output    Intake/Output Summary (Last 24 hours) at 2024 1325  Last data filed at 2024 1800  Gross per 24 hour   Intake 240 ml   Output 100 ml   Net 140 ml        Physical exam:  Constitutional: Thin female in no acute distress.  HEENT: NCAT. EOMI.   Respiratory: CTAB. No wheezes, crackles, or rhonchi. Normal respiratory effort on RA.  Cardiovascular: RRR, normal S1/S2, No murmurs, rubs, or gallops.   Abdominal: Soft, nondistended, nontender to palpation. No rebound or guarding  Neuro: CN II-XII grossly intact. Moving all extremities with no focal deficits.   MSK: WWP, R>L LE edema.   Skin: erythematous patches, edema improved from prior  Psych: Appropriate mood and affect.      Medications   Scheduled Medications  azelastine, 2 spray, Each Nostril, BID  calcium carbonate-vitamin D3, 1 tablet, oral, BID  enoxaparin, 30 mg, subcutaneous, q24h ELYSE  insulin lispro, 0-10 Units, subcutaneous, TID  loratadine, 10 mg, oral, Daily  losartan, 50 mg, oral, Daily  montelukast, 10 mg, oral, Daily  multivitamin with minerals iron-free, 1 tablet, oral, Daily  [Held by provider] pravastatin, 40 mg, oral, Daily  predniSONE, 50 mg, oral, Daily  traZODone, 25 mg, oral, Nightly  triamcinolone, , Topical, BID     Continuous Medications    PRN Medications  PRN medications: acetaminophen, acetaminophen, dextrose, diclofenac sodium, glucagon, polyethylene glycol       Labs  CBC  Results from last 72 hours   Lab Units 24  0910 24  0916 06/10/24  0818   WBC  AUTO x10*3/uL 16.3* 10.7 9.9   HEMOGLOBIN g/dL 10.0* 9.1* 9.9*   HEMATOCRIT % 30.3* 28.7* 29.1*   PLATELETS AUTO x10*3/uL 261 192 215        BMP  Results from last 72 hours   Lab Units 06/12/24  0910 06/11/24  0916 06/10/24  0818   SODIUM mmol/L 148* 145 146*   POTASSIUM mmol/L 4.1 3.9 3.3*   CHLORIDE mmol/L 114* 113* 110*   BUN mg/dL 12 12 12   CREATININE mg/dL 0.64 0.67 0.59   MAGNESIUM mg/dL 2.12 2.14 2.52*   PHOSPHORUS mg/dL 2.1* 1.9* 3.4       Imaging:  === 06/07/24 ===    US GALLBLADDER    - Impression -  1. Gallbladder sludge without sonographic evidence of acute  cholecystitis.  2. Few small simple cysts and calcified granuloma within the liver.    I personally reviewed the images/study and I agree with the findings  as stated by resident physician Dr. Yevgeniy Dong.    MACRO:  None      Signed by: Hossein Perry 6/8/2024 4:50 PM  Dictation workstation:   [id number]   === 06/07/24 ===    CT HEAD WO IV CONTRAST    - Impression -  1. No acute intracranial hemorrhage or mass effect.  2. Paranasal sinus mucosal thickening most prominently affecting the  right maxillary sinus. 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 ENT follow-up is recommended.      I personally reviewed the images/study and I agree with the findings  as stated by Resident Timothy Costa MD. This study was interpreted  at [facility], [place], [place].    MACRO:  None    Signed by: Albin Jonas 6/8/2024 12:17 AM  Dictation workstation:   [id number]          Assessment and plan:  Kerry Arevalo is a 84 y.o. female with PMHx of HTN, T2DM, OA, RA, and spinal stenosis c/b lumbar radiculopathy who presented to the ED with slurred speech and confusion.  Initially suspected to be medication induced (recently started on medrol at urgent care for back pain and family suspects inappropriate usage by pt) vs less likely TIA (given recurrence of AMS and new rash).  Now also with concern for DRESS  per Dermatology - given timeline of symptoms (pt had started sulfasalazine ~6 weeks prior to presentation) with urticarial rash, new transaminitis and altered mental status. Punch biopsy performed which was consistent with DRESS. Treating with prednisone course.     Updates 6/12/24  -Orthostats neg  -Continue pred 50  -CMV IgM, HHV6 PCR, HHV7 neg  -Derm biopsy with lymphocytic infiltrate and rare dyskeratosis. Per derm, consistent with DRESS  -Will continue pred and discuss taper   -LFTs downtrending  -Restart home losartan       #AMS  #Urticaria  #C/f DRESS  ::Initially suspected to be medication induced (recently started on medrol at urgent care for back pain and family suspects inappropriate usage by pt) vs less likely TIA (recurrence of AMS and new rash).  :: No focal neuro deficits on evaluation in ED   :: CTH no acute findings, paranasal mucosal thickening   :: CXR without acute process  :: TSH wnl  :: S/p IV benadryl 50 mg IV and Famotidine 20 mg IV in the ED -> significant confusion after receiving benadryl, unclear if due to underlying process or due to benadryl sedative effect   :: Dermatology consulted for rash. Noted that given timeline of symptoms (pt had started sulfasalazine ~6 weeks prior to presentation), new transaminitis, rash and altered mental status, DRESS is a concern. Punch biopsy performed, but recommending treatment for DRESS pending results. No eosinophilia at this time.   :: UDS negative  ::Derm biopsy with lymphocytic infiltrate and rare dyskeratosis. Per derm, consistent with DRESS  - HOLD tizanidine, sulfasalazine  - Pred 50mg daily per Derm recs  - F/up punch biopsy  -CMV IgG pos, IgM neg, EBV IgG pos, IgM neg, HHV6 PCR, HHV7 neg       # Elevated Liver Enzymes, downtrending  :: Hepatocellular pattern, with concern for DRESS (see above)  :: No RUQ abdominal pain  ::-Liver US with hepatic cysts and biliary sludge, no signs of cholecystitis  -Trend LFt     # Anemia  :: Hgb 9.5 baseline  ~12-13  :: No evidence of bleeding   :: Possibly secondary to sulfasalazine   - CTM         # HTN  :: BP soft on admission but after 1L LR pt was hypertensive SBP 150s when recheck in room   -held losartan iso orthostatic sx when working with PT, orthostats neg so restarted     # Sinusitis  -C/w Azelastine spray   -C/w Montelukast 10 mg   - ENT follow up outpt     #Steroid induced hyperglycemia  ::HbA1c 5.8 on admission  -Start SSI      F : PRN  E: PRN  N: Regular      DVT prophylaxis: Lovenox subq     Code Status: DNR and No Intubation (confirmed on admission)   NOK: Kaitlin Hammonds (daughter) [phone]       Patient seen and discussed with attending physician.    Safia Abdullahi MD  PGY-1 Internal Medicine

## 2024-06-13 ENCOUNTER — PHARMACY VISIT (OUTPATIENT)
Dept: PHARMACY | Facility: CLINIC | Age: 84
End: 2024-06-13
Payer: COMMERCIAL

## 2024-06-13 VITALS
WEIGHT: 121.91 LBS | DIASTOLIC BLOOD PRESSURE: 67 MMHG | TEMPERATURE: 98.6 F | HEART RATE: 93 BPM | OXYGEN SATURATION: 96 % | SYSTOLIC BLOOD PRESSURE: 154 MMHG | BODY MASS INDEX: 28.21 KG/M2 | RESPIRATION RATE: 18 BRPM | HEIGHT: 55 IN

## 2024-06-13 LAB
ALBUMIN SERPL BCP-MCNC: 2.5 G/DL (ref 3.4–5)
ALP SERPL-CCNC: 165 U/L (ref 33–136)
ALT SERPL W P-5'-P-CCNC: 70 U/L (ref 7–45)
ANION GAP SERPL CALC-SCNC: 11 MMOL/L (ref 10–20)
AST SERPL W P-5'-P-CCNC: 25 U/L (ref 9–39)
BASOPHILS # BLD MANUAL: 0 X10*3/UL (ref 0–0.1)
BASOPHILS NFR BLD MANUAL: 0 %
BILIRUB DIRECT SERPL-MCNC: 0.2 MG/DL (ref 0–0.3)
BILIRUB SERPL-MCNC: 0.5 MG/DL (ref 0–1.2)
BUN SERPL-MCNC: 10 MG/DL (ref 6–23)
CALCIUM SERPL-MCNC: 8.8 MG/DL (ref 8.6–10.6)
CHLORIDE SERPL-SCNC: 110 MMOL/L (ref 98–107)
CO2 SERPL-SCNC: 26 MMOL/L (ref 21–32)
CREAT SERPL-MCNC: 0.6 MG/DL (ref 0.5–1.05)
EGFRCR SERPLBLD CKD-EPI 2021: 89 ML/MIN/1.73M*2
EOSINOPHIL # BLD MANUAL: 0 X10*3/UL (ref 0–0.4)
EOSINOPHIL NFR BLD MANUAL: 0 %
ERYTHROCYTE [DISTWIDTH] IN BLOOD BY AUTOMATED COUNT: 13.2 % (ref 11.5–14.5)
GLUCOSE BLD MANUAL STRIP-MCNC: 186 MG/DL (ref 74–99)
GLUCOSE BLD MANUAL STRIP-MCNC: 93 MG/DL (ref 74–99)
GLUCOSE SERPL-MCNC: 84 MG/DL (ref 74–99)
HCT VFR BLD AUTO: 23.9 % (ref 36–46)
HGB BLD-MCNC: 8.5 G/DL (ref 12–16)
IMM GRANULOCYTES # BLD AUTO: 0.04 X10*3/UL (ref 0–0.5)
IMM GRANULOCYTES NFR BLD AUTO: 0.2 % (ref 0–0.9)
LYMPHOCYTES # BLD MANUAL: 10.1 X10*3/UL (ref 0.8–3)
LYMPHOCYTES NFR BLD MANUAL: 54.3 %
MAGNESIUM SERPL-MCNC: 2.04 MG/DL (ref 1.6–2.4)
MCH RBC QN AUTO: 28.3 PG (ref 26–34)
MCHC RBC AUTO-ENTMCNC: 35.6 G/DL (ref 32–36)
MCV RBC AUTO: 80 FL (ref 80–100)
MONOCYTES # BLD MANUAL: 1.45 X10*3/UL (ref 0.05–0.8)
MONOCYTES NFR BLD MANUAL: 7.8 %
NEUTROPHILS # BLD MANUAL: 6.57 X10*3/UL (ref 1.6–5.5)
NEUTS BAND # BLD MANUAL: 0.32 X10*3/UL (ref 0–0.5)
NEUTS BAND NFR BLD MANUAL: 1.7 %
NEUTS SEG # BLD MANUAL: 6.25 X10*3/UL (ref 1.6–5)
NEUTS SEG NFR BLD MANUAL: 33.6 %
NRBC BLD-RTO: 0.1 /100 WBCS (ref 0–0)
OVALOCYTES BLD QL SMEAR: ABNORMAL
PHOSPHATE SERPL-MCNC: 2.2 MG/DL (ref 2.5–4.9)
PLATELET # BLD AUTO: 229 X10*3/UL (ref 150–450)
POTASSIUM SERPL-SCNC: 3.8 MMOL/L (ref 3.5–5.3)
PROT SERPL-MCNC: 4.4 G/DL (ref 6.4–8.2)
RBC # BLD AUTO: 3 X10*6/UL (ref 4–5.2)
RBC MORPH BLD: ABNORMAL
SCHISTOCYTES BLD QL SMEAR: ABNORMAL
SODIUM SERPL-SCNC: 143 MMOL/L (ref 136–145)
TARGETS BLD QL SMEAR: ABNORMAL
TOTAL CELLS COUNTED BLD: 116
VARIANT LYMPHS # BLD MANUAL: 0.48 X10*3/UL (ref 0–0.3)
VARIANT LYMPHS NFR BLD: 2.6 %
WBC # BLD AUTO: 18.6 X10*3/UL (ref 4.4–11.3)

## 2024-06-13 PROCEDURE — 2500000004 HC RX 250 GENERAL PHARMACY W/ HCPCS (ALT 636 FOR OP/ED)

## 2024-06-13 PROCEDURE — 2500000004 HC RX 250 GENERAL PHARMACY W/ HCPCS (ALT 636 FOR OP/ED): Performed by: STUDENT IN AN ORGANIZED HEALTH CARE EDUCATION/TRAINING PROGRAM

## 2024-06-13 PROCEDURE — 83735 ASSAY OF MAGNESIUM: CPT

## 2024-06-13 PROCEDURE — 84100 ASSAY OF PHOSPHORUS: CPT

## 2024-06-13 PROCEDURE — 85007 BL SMEAR W/DIFF WBC COUNT: CPT

## 2024-06-13 PROCEDURE — RXMED WILLOW AMBULATORY MEDICATION CHARGE

## 2024-06-13 PROCEDURE — 82947 ASSAY GLUCOSE BLOOD QUANT: CPT | Mod: 91

## 2024-06-13 PROCEDURE — 80053 COMPREHEN METABOLIC PANEL: CPT

## 2024-06-13 PROCEDURE — 36415 COLL VENOUS BLD VENIPUNCTURE: CPT

## 2024-06-13 PROCEDURE — 99239 HOSP IP/OBS DSCHRG MGMT >30: CPT

## 2024-06-13 PROCEDURE — 2500000001 HC RX 250 WO HCPCS SELF ADMINISTERED DRUGS (ALT 637 FOR MEDICARE OP)

## 2024-06-13 PROCEDURE — 85027 COMPLETE CBC AUTOMATED: CPT

## 2024-06-13 PROCEDURE — 82248 BILIRUBIN DIRECT: CPT

## 2024-06-13 RX ORDER — TRIAMCINOLONE ACETONIDE 1 MG/G
OINTMENT TOPICAL 2 TIMES DAILY
Qty: 454 G | Refills: 0 | Status: SHIPPED | OUTPATIENT
Start: 2024-06-13 | End: 2024-06-27

## 2024-06-13 RX ORDER — PREDNISONE 10 MG/1
40 TABLET ORAL DAILY
Qty: 56 TABLET | Refills: 0 | Status: SHIPPED | OUTPATIENT
Start: 2024-06-14

## 2024-06-13 RX ORDER — PREDNISONE 20 MG/1
40 TABLET ORAL DAILY
Status: DISCONTINUED | OUTPATIENT
Start: 2024-06-13 | End: 2024-06-13 | Stop reason: HOSPADM

## 2024-06-13 RX ADMIN — Medication 1 TABLET: at 08:38

## 2024-06-13 RX ADMIN — PREDNISONE 40 MG: 20 TABLET ORAL at 08:38

## 2024-06-13 RX ADMIN — LORATADINE 10 MG: 10 TABLET ORAL at 08:38

## 2024-06-13 RX ADMIN — ENOXAPARIN SODIUM 30 MG: 30 INJECTION SUBCUTANEOUS at 08:39

## 2024-06-13 RX ADMIN — LOSARTAN POTASSIUM 50 MG: 25 TABLET, FILM COATED ORAL at 08:38

## 2024-06-13 RX ADMIN — AZELASTINE HYDROCHLORIDE 2 SPRAY: 137 SPRAY, METERED NASAL at 08:39

## 2024-06-13 RX ADMIN — TRIAMCINOLONE ACETONIDE: 1 OINTMENT TOPICAL at 08:39

## 2024-06-13 RX ADMIN — MONTELUKAST 10 MG: 10 TABLET, FILM COATED ORAL at 08:38

## 2024-06-13 ASSESSMENT — COGNITIVE AND FUNCTIONAL STATUS - GENERAL
PERSONAL GROOMING: A LITTLE
HELP NEEDED FOR BATHING: A LITTLE
DRESSING REGULAR LOWER BODY CLOTHING: A LITTLE
MOVING TO AND FROM BED TO CHAIR: A LITTLE
CLIMB 3 TO 5 STEPS WITH RAILING: A LOT
DRESSING REGULAR UPPER BODY CLOTHING: A LITTLE
TOILETING: A LITTLE
DAILY ACTIVITIY SCORE: 18
MOBILITY SCORE: 17
TURNING FROM BACK TO SIDE WHILE IN FLAT BAD: A LITTLE
EATING MEALS: A LITTLE
WALKING IN HOSPITAL ROOM: A LOT
STANDING UP FROM CHAIR USING ARMS: A LITTLE

## 2024-06-13 ASSESSMENT — PAIN - FUNCTIONAL ASSESSMENT: PAIN_FUNCTIONAL_ASSESSMENT: 0-10

## 2024-06-13 ASSESSMENT — PAIN SCALES - GENERAL
PAINLEVEL_OUTOF10: 0 - NO PAIN
PAINLEVEL_OUTOF10: 0 - NO PAIN

## 2024-06-13 NOTE — CARE PLAN
The patient's goals for the shift include    Problem: Pain  Goal: My pain/discomfort is manageable  Outcome: Progressing     Problem: Safety  Goal: Patient will be injury free during hospitalization  Outcome: Progressing  Goal: I will remain free of falls  Outcome: Progressing     Problem: Daily Care  Goal: Daily care needs are met  Outcome: Progressing     Problem: Psychosocial Needs  Goal: Demonstrates ability to cope with hospitalization/illness  Outcome: Progressing  Goal: Collaborate with me, my family, and caregiver to identify my specific goals  Outcome: Progressing     Problem: Discharge Barriers  Goal: My discharge needs are met  Outcome: Progressing     Problem: Skin  Goal: Prevent/manage excess moisture  Outcome: Progressing  Flowsheets (Taken 6/13/2024 0509)  Prevent/manage excess moisture:   Cleanse incontinence/protect with barrier cream   Moisturize dry skin  Goal: Prevent/minimize sheer/friction injuries  Outcome: Progressing  Flowsheets (Taken 6/13/2024 0503)  Prevent/minimize sheer/friction injuries:   Use pull sheet   HOB 30 degrees or less  Goal: Promote/optimize nutrition  Outcome: Progressing  Flowsheets (Taken 6/13/2024 050)  Promote/optimize nutrition: Monitor/record intake including meals  Goal: Promote skin healing  Outcome: Progressing       The clinical goals for the shift include Pt will remain safe and HDS throughout this shift.

## 2024-06-13 NOTE — NURSING NOTE
Discharge instructions discussed with patient and family.  No further questions.  IV removed.  M2B delivered to [facility]. Belongings with patient.

## 2024-06-13 NOTE — DISCHARGE SUMMARY
Discharge Diagnosis  Disorientation      Test Results Pending At Discharge  Pending Labs       Order Current Status    Cytomegalovirus Antibody, IgM Collected (06/08/24 2019)    Cytomegalovirus IgG Collected (06/08/24 2019)    HHV-6 Antibody,IgG In process    HHV-6 Antibody,IgM In process    CBC and Auto Differential Preliminary result            Hospital Course  Kerry Arevalo is a 84 y.o. female with PMHx of HTN, T2DM, OA, RA, and spinal stenosis c/b lumbar radiculopathy who presented to the ED with slurred speech and confusion.  Initially suspected to be medication induced (recently started on medrol at urgent care for back pain and family suspects inappropriate usage by pt) vs less likely TIA (given recurrence of AMS and new rash).  Initial labs s/f new transaminitis. Dermatology consulted, thought that presentation c/w DRESS given timeline of symptoms (pt had started sulfasalazine ~6 weeks prior to presentation) with urticarial rash, new transaminitis and altered mental status. Punch biopsy performed which showed lymphocytic infiltrate c/w DRESS. Pt initiated on prednisone and triamcinolone ointment BID for treatment per dermatology recs. Course c/b steroid-induced hyperglycemia and encephalopathy. Pt evaluated by PT/OT and skilled for SNF. Pt discharged to home with  referral for outpatient PT per family's wishes. Follow up appointment arranged with PCP, dermatology, rheumatology, and podiatry. Pt instructed to continue steroids and ointment until derm follow up 2 weeks from discharge. Order also placed for follow up labs to trend LFTs, derm to follow. Sufasalazine listed as allergy in chart.    Derm- Follow labs, pred taper- need for PJP prophylaxis depending on taper?  PCP- Restarting pravastatin-need for PJP prophylaxis depending on taper?  Podiatry- Foot cysts, chronic pain  Rheumatology-RA follow up, sulfasalazine discussion         Pertinent Physical Exam At Time of Discharge  Vitals:    06/13/24 0538    BP: 154/67   Pulse: 93   Resp: 18   Temp: 37 °C (98.6 °F)   SpO2: 96%     Constitutional: Thin female in no acute distress.  HEENT: NCAT. EOMI.   Respiratory: CTAB. No wheezes, crackles, or rhonchi. Normal respiratory effort on RA.  Cardiovascular: RRR, normal S1/S2, No murmurs, rubs, or gallops.   Abdominal: Soft, nondistended, nontender to palpation. No rebound or guarding  Neuro: CN II-XII grossly intact. Moving all extremities with no focal deficits.   MSK: WWP, LE edema  Skin: erythematous/violaceous patches  Psych: Appropriate mood and affect.    Home Medications     Medication List      START taking these medications     predniSONE 10 mg tablet; Commonly known as: Deltasone; Take 4 tablets   (40 mg) by mouth once daily daily for 14 days.; Start taking on: June 14, 2024   triamcinolone 0.1 % ointment; Commonly known as: Kenalog; Apply   topically 2 times a day for 14 days.     CONTINUE taking these medications     acetaminophen 500 mg/15 mL liquid   aspirin 81 mg EC tablet   azelastine 137 mcg (0.1 %) nasal spray; Commonly known as: Astelin   calcium citrate 200 mg (950 mg) tablet; Commonly known as: Calcitrate   Caltrate 600 plus D 600 mg-20 mcg (800 unit) tablet,chewable; Generic   drug: calcium carbonate-vitamin D3   celecoxib 200 mg capsule; Commonly known as: CeleBREX; TAKE 1 CAPSULE BY   MOUTH ONCE  DAILY FOR PAIN AS NEEDED   Centrum Silver 0.4 mg-300 mcg- 250 mcg; Generic drug: multivitamin with   minerals iron-free   diclofenac sodium 1 % gel; Commonly known as: Voltaren; APPLY 4 GRAMS TO   AFFECTED  AREA(S) TOPICALLY TWICE DAILY AS NEEDED FOR PAIN   losartan 50 mg tablet; Commonly known as: Cozaar; Take 1 tablet (50 mg)   by mouth once daily.   LYCOPENE ORAL   montelukast 10 mg tablet; Commonly known as: Singulair; Take 1 tablet   (10 mg) by mouth once daily.   sennosides-docusate sodium 8.6-50 mg tablet; Commonly known as:   Brittny-Colace     STOP taking these medications     methylPREDNISolone 4  mg tablets; Commonly known as: Medrol Dospak   pravastatin 40 mg tablet; Commonly known as: Pravachol   sulfaSALAzine 500 mg DR tablet; Commonly known as: Azulfidine   tiZANidine 4 mg tablet; Commonly known as: Zanaflex       Outpatient Follow-Up  Future Appointments   Date Time Provider Department [place]   9/17/2024  1:40 PM Jony Goncalves MD [id number] [facility]   5/20/2025  1:00 PM Terry Cardenas MD [id number] [facility]       Safia Abdullahi MD

## 2024-06-15 LAB
ATRIAL RATE: 62 BPM
HHV6 IGG TITR SER IF: ABNORMAL {TITER}
HHV6 IGM TITR SER IF: NORMAL {TITER}
P AXIS: 44 DEGREES
P OFFSET: 184 MS
P ONSET: 128 MS
PR INTERVAL: 186 MS
Q ONSET: 221 MS
QRS COUNT: 10 BEATS
QRS DURATION: 98 MS
QT INTERVAL: 416 MS
QTC CALCULATION(BAZETT): 422 MS
QTC FREDERICIA: 420 MS
R AXIS: -30 DEGREES
T AXIS: 49 DEGREES
T OFFSET: 429 MS
VENTRICULAR RATE: 62 BPM

## 2024-06-17 ENCOUNTER — LAB (OUTPATIENT)
Dept: LAB | Facility: LAB | Age: 84
End: 2024-06-17
Payer: MEDICARE

## 2024-06-17 DIAGNOSIS — T50.905A DRESS SYNDROME: Primary | ICD-10-CM

## 2024-06-17 DIAGNOSIS — R21 RASH AND OTHER NONSPECIFIC SKIN ERUPTION: ICD-10-CM

## 2024-06-17 DIAGNOSIS — D72.12 DRESS SYNDROME: Primary | ICD-10-CM

## 2024-06-17 LAB
ALBUMIN SERPL BCP-MCNC: 3.3 G/DL (ref 3.4–5)
ALP SERPL-CCNC: 135 U/L (ref 33–136)
ALT SERPL W P-5'-P-CCNC: 77 U/L (ref 7–45)
ANION GAP SERPL CALC-SCNC: 14 MMOL/L (ref 10–20)
AST SERPL W P-5'-P-CCNC: 55 U/L (ref 9–39)
BILIRUB SERPL-MCNC: 0.8 MG/DL (ref 0–1.2)
BUN SERPL-MCNC: 10 MG/DL (ref 6–23)
CALCIUM SERPL-MCNC: 9.5 MG/DL (ref 8.6–10.6)
CHLORIDE SERPL-SCNC: 106 MMOL/L (ref 98–107)
CO2 SERPL-SCNC: 26 MMOL/L (ref 21–32)
CREAT SERPL-MCNC: 0.75 MG/DL (ref 0.5–1.05)
EGFRCR SERPLBLD CKD-EPI 2021: 79 ML/MIN/1.73M*2
GLUCOSE SERPL-MCNC: 93 MG/DL (ref 74–99)
POTASSIUM SERPL-SCNC: 4.2 MMOL/L (ref 3.5–5.3)
PROT SERPL-MCNC: 5.5 G/DL (ref 6.4–8.2)
SODIUM SERPL-SCNC: 142 MMOL/L (ref 136–145)

## 2024-06-17 PROCEDURE — 36415 COLL VENOUS BLD VENIPUNCTURE: CPT

## 2024-06-17 PROCEDURE — 80053 COMPREHEN METABOLIC PANEL: CPT

## 2024-06-18 ENCOUNTER — APPOINTMENT (OUTPATIENT)
Dept: PRIMARY CARE | Facility: CLINIC | Age: 84
End: 2024-06-18
Payer: MEDICARE

## 2024-06-18 ENCOUNTER — TELEPHONE (OUTPATIENT)
Dept: HOME HEALTH SERVICES | Facility: HOME HEALTH | Age: 84
End: 2024-06-18

## 2024-06-18 VITALS — BODY MASS INDEX: 26.73 KG/M2 | WEIGHT: 115 LBS | SYSTOLIC BLOOD PRESSURE: 130 MMHG | DIASTOLIC BLOOD PRESSURE: 60 MMHG

## 2024-06-18 DIAGNOSIS — D72.12 DRESS SYNDROME: Primary | ICD-10-CM

## 2024-06-18 DIAGNOSIS — R53.81 PHYSICAL DECONDITIONING: ICD-10-CM

## 2024-06-18 DIAGNOSIS — T50.905A DRESS SYNDROME: Primary | ICD-10-CM

## 2024-06-18 PROCEDURE — 1111F DSCHRG MED/CURRENT MED MERGE: CPT | Performed by: INTERNAL MEDICINE

## 2024-06-18 PROCEDURE — 3075F SYST BP GE 130 - 139MM HG: CPT | Performed by: INTERNAL MEDICINE

## 2024-06-18 PROCEDURE — 99215 OFFICE O/P EST HI 40 MIN: CPT | Performed by: INTERNAL MEDICINE

## 2024-06-18 PROCEDURE — 3078F DIAST BP <80 MM HG: CPT | Performed by: INTERNAL MEDICINE

## 2024-06-18 RX ORDER — LIDOCAINE 40 MG/G
CREAM TOPICAL 4 TIMES DAILY PRN
Qty: 28 G | Refills: 0 | Status: SHIPPED | OUTPATIENT
Start: 2024-06-18 | End: 2025-06-18

## 2024-06-18 NOTE — PROGRESS NOTES
Subjective   Patient ID: Kerry Arevalo is a 84 y.o. female who presents for Hospital Follow-up. She has a PMHx of HTN, OA, RA, and spinal stenosis c/b lumbar radiculopathy. She was recently hospitalized for AMS and new rash. Initially suspected to be drug induced (solumedrol dose pack) and less likely TIA given the recurrence of AMS. Patient was also seen by Dermatology for the rash. There was a concern for DRESS per Dermatology given the timeline of urticarial rash and new transaminitis (patient was started on Sulfasalazine 6 weeks prior to presentation). Punch biopsy was performed which was consistent with DRESS. Patient was discharged home with PO Prednisone taper regimen and follow up appointments with Dermatology and Rheumatology.    Objective   /60   Wt 52.2 kg (115 lb)   BMI 26.73 kg/m²     Physical Exam  Constitutional: Weak appearing  HEENT: clear sclera, moist mucous membranes  CV: RRR  PULM: CTAB, no coughing or wheezing  ABDOMEN: Soft, NT/ND.   SKIN:Dry, diffuse urticarial rash subsiding   EXTREMITIES: Full ROM, No Pedal Edema  NEURO: A&O x 4, nonfocal neurological exam.    Assessment/Plan   Problem List Items Addressed This Visit    None  Visit Diagnoses       DRESS syndrome    -  Primary    Relevant Medications    lidocaine (LMX 4) 4 % cream    Other Relevant Orders    Referral to Home Care    CBC and Auto Differential    Comprehensive metabolic panel    Physical deconditioning        Relevant Medications    lidocaine (LMX 4) 4 % cream    Other Relevant Orders    Referral to Home Care    CBC and Auto Differential    Comprehensive metabolic panel          Kerry Arevalo is a 84 y.o. female who presents for Hospital Follow-up. She has a PMHx of recently diagnosed DRESS syndrome, HTN, OA, RA, and spinal stenosis c/b lumbar radiculopathy.    #Physical deconditioning   - Referral to home health care provided for PT services    #Biopsy proven DRESS   - Likely from sulfasalazine use  - Continue  Prednisone taper dose and kenalog cream  - Will repeat labs in one week to follow up on transaminitis   - Patient will follow up with Derm and rheumatology as outpatient    #HTN  - BP well controlled   - Continue Losartan 50mg daily    Follow up in 6 weeks

## 2024-06-18 NOTE — TELEPHONE ENCOUNTER
Hello,     [facility] received your referral for home health care.  In order for us to move forward with processing the referral, your visit note dated for 6/18/24 needs to be completed & signed. Per policy, we will close the referral if the visit note is not signed by 6/21/24. Thank you    Dinora An LPN  [facility]  [facility]  [address] [place] [address]  [phone]

## 2024-06-18 NOTE — PROGRESS NOTES
I reviewed the resident/fellow's documentation and discussed the patient with the resident/fellow. I agree with the resident/fellow's medical decision making as documented in the note.  An interactive audio and video telecommunication system which permits real time communications between the patient (at the originating site) and provider (at the distant site) was utilized to provide this telehealth service.    Verbal consent was requested and obtained from the patient on the day of encounter.  This is a virtual visit using HIPAA compliant video platform. It required patient-provider interaction for the medical decision making as documented.     I have communicated my name and active licensure. The patient's identity and physical location were verified at the time of this visit.   Either the patient or their legal representative has been informed of the risks and benefits of -- and alternatives to -- treatment through a remote evaluation and consents to proceed with the evaluation remotely.     Hospital follow up visit  Hospital notes, records, consultation reports, bx reports reviewed, meds rec completed, medications reviewed in details, hospital instructions reviewed.  Recommend [facility] services due to severe deconditioning, frailty and declining functional status, as well as balance issues.  She is unable to leave the house on her own and needs assistance from other persons in the family.      Ivy Brown MD

## 2024-06-19 ENCOUNTER — HOME HEALTH ADMISSION (OUTPATIENT)
Dept: HOME HEALTH SERVICES | Facility: HOME HEALTH | Age: 84
End: 2024-06-19
Payer: MEDICARE

## 2024-06-19 ENCOUNTER — DOCUMENTATION (OUTPATIENT)
Dept: HOME HEALTH SERVICES | Facility: HOME HEALTH | Age: 84
End: 2024-06-19
Payer: MEDICARE

## 2024-06-19 NOTE — HH CARE COORDINATION
Home Care received a Referral for Physical Therapy, Occupational Therapy, and Home Health Aide. We have processed the referral for a Start of Care on 24-48 HOURS .     If you have any questions or concerns, please feel free to contact us at [phone]. Follow the prompts, enter your five digit zip code, and you will be directed to your care team on CENTL 3.

## 2024-06-20 ENCOUNTER — HOME CARE VISIT (OUTPATIENT)
Dept: HOME HEALTH SERVICES | Facility: HOME HEALTH | Age: 84
End: 2024-06-20
Payer: MEDICARE

## 2024-06-20 VITALS
DIASTOLIC BLOOD PRESSURE: 66 MMHG | SYSTOLIC BLOOD PRESSURE: 144 MMHG | RESPIRATION RATE: 16 BRPM | TEMPERATURE: 99 F | HEART RATE: 98 BPM

## 2024-06-20 DIAGNOSIS — Z00.00 HEALTHCARE MAINTENANCE: ICD-10-CM

## 2024-06-20 PROCEDURE — G0151 HHCP-SERV OF PT,EA 15 MIN: HCPCS

## 2024-06-20 RX ORDER — CALCIUM CARBONATE/VITAMIN D3 600 MG-20
1 TABLET,CHEWABLE ORAL 2 TIMES DAILY
Qty: 180 TABLET | Refills: 2 | Status: SHIPPED | OUTPATIENT
Start: 2024-06-20

## 2024-06-20 ASSESSMENT — ENCOUNTER SYMPTOMS
PAIN LOCATION: RIGHT LEG
PAIN LOCATION - PAIN SEVERITY: 10/10
SUBJECTIVE PAIN PROGRESSION: UNCHANGED
PAIN LOCATION: LEFT LEG
PAIN: 1
LOWEST PAIN SEVERITY IN PAST 24 HOURS: 2/10
PERSON REPORTING PAIN: PATIENT
HIGHEST PAIN SEVERITY IN PAST 24 HOURS: 10/10
PAIN LOCATION - PAIN SEVERITY: 10/10
PAIN SEVERITY GOAL: 0/10

## 2024-06-20 ASSESSMENT — GAIT ASSESSMENTS
WALKING STANCE: 0 - HEELS APART
TRUNK SCORE: 0
TRUNK: 0 - MARKED SWAY OR USES WALKING AID
STEP CONTINUITY: 0 - STOPPING OR DISCONTINUITY BETWEEN STEPS
INITIATION OF GAIT IMMEDIATELY AFTER GO: 0 - ANY HESITANCY OR MULTIPLE ATTEMPTS TO START
PATH: 1 - MILD/MODERATE DEVIATION OR USES WALKING AID
GAIT SCORE: 4
PATH SCORE: 1
STEP SYMMETRY: 1 - RIGHT AND LEFT STEP LENGTH APPEAR EQUAL
BALANCE AND GAIT SCORE: 12

## 2024-06-20 ASSESSMENT — BALANCE ASSESSMENTS
STANDING BALANCE: 1 - STEADY BUT WIDE STANCE AND USES CANE OR OTHER SUPPORT
ARISING SCORE: 1
ATTEMPTS TO ARISE: 1 - ABLE, REQUIRES MORE THAN ONE ATTEMPT
BALANCE SCORE: 8
NUDGED: 2 - STEADY
SITTING DOWN: 1 - USES ARMS OR NOT SMOOTH MOTION
SITTING BALANCE: 1 - STEADY, SAFE
IMMEDIATE STANDING BALANCE FIRST 5 SECONDS: 1 - STEADY BUT USES WALKER OR OTHER SUPPORT
NUDGED SCORE: 2
ARISES: 1 - ABLE, USES ARMS TO HELP
EYES CLOSED AT MAXIMUM POSITION NUDGED: 0 - UNSTEADY
TURNING 360 DEGREES STEPS: 0 - DISCONTINUOUS STEPS

## 2024-06-20 ASSESSMENT — ACTIVITIES OF DAILY LIVING (ADL)
ENTERING_EXITING_HOME: STAND BY ASSIST
OASIS_M1830: 03
AMBULATION ASSISTANCE ON FLAT SURFACES: 1

## 2024-06-20 NOTE — Clinical Note
Start of care completed for home health PT today. Pt presents with B LE pain and weakness affecting her mobility. Pt issued phone numbers for 2 different podiatrists who make house calls. We will see her twice weekly for 3 weeks starting next week. She did score 14 on the PHQ-9 signifying moderate depression, although this appears to be situational due to her current condition. She also stated she has not been eating. I recommended Ensure but she said she was not interested as she had to give that to her  and she swore she would never use it herself. OT to follow for ADL training. Pt is declining home health aide service at this time.

## 2024-06-20 NOTE — HOME HEALTH
S: This pt was referred to home health PT following hospitalization due to reaction to medication issued at urgent care, which caused altered mental status and B LE pain. She is demonstrating increased difficulty with mobility as a result.    B: Pt lives with her daughter in a duplex with 6 stairs to exit the building. Her bedroom is on the main floor without stairs to navigate. Pt is using a cane with SBA for all ambulation but was independent with ambulation without use of assistive device prior to current episode of care. Pt reports no recent falls and rates current pain level at 10/10. Medications reconciled in the home and educated on the risks involved with use of aspirin.    A: Pt presents with B LE weakness affecting transfers, gait / stairs and balance, as well as gait and balance impairment as illustrated by Tinetti score of 12/28. Pt ambulates using cane demonstrating shortened strides, inconsistent rhythm and instability. Pt issued phone numbers for 2 different podiatrists who will visit her in her home. Pictures with written instructions of seated B LE AROM exercises issued, practiced and reviewed for HEP with good return demo and good understanding.    R: Pt will benefit from skilled PT for LE strengthening to facilitate transfers, gait / stairs and balance, as well as transfer, gait and balance training to improve functional mobility and independence. OT to follow for ADL training. Pt is declining HHA service at this time.

## 2024-06-20 NOTE — Clinical Note
Pt opened. She had gone to urgent care due to not feeling well, and then had severe reaction to a medication they gave her and ended up in the hospital. She is now having B LE pain and weakness affecting balance and mobility.

## 2024-06-20 NOTE — SIGNIFICANT EVENT
Called patient 6/19/24. Patient reports that her rash has continued to improved and she has now noticed some peeling of her skin. Reassured patient that this is the normal progression of the rash.     Discussed with patient and her daughter that liver enzymes were mildly elevated (AST 55 ALT 77), but overall improved from when she was first admitted.     Patient was started on prednisone 50mg during her hospitalization (received 6/8-6/12) and tapered to 40mg (6/13). Discussed further tapering recommendations:   -Prednisone 30mg x 7 days (6/20-6/26)  -Prednisone 20mg x 7 days (6/27-7/4)    Advised patient to stop by the lab 7/1-7/3 to recheck her  labs (CMP). Patient has follow up scheduled with Dr. Mullins 7/5/23. Patient's daughter expressed understanding and agreement with plan.     Lauryn Frausto, DO

## 2024-06-21 ENCOUNTER — HOME CARE VISIT (OUTPATIENT)
Dept: HOME HEALTH SERVICES | Facility: HOME HEALTH | Age: 84
End: 2024-06-21
Payer: MEDICARE

## 2024-06-21 DIAGNOSIS — M85.80 OSTEOPENIA, UNSPECIFIED LOCATION: ICD-10-CM

## 2024-06-21 PROCEDURE — G0152 HHCP-SERV OF OT,EA 15 MIN: HCPCS

## 2024-06-21 RX ORDER — IBUPROFEN 200 MG
1 CAPSULE ORAL DAILY
Qty: 90 TABLET | Refills: 3 | Status: SHIPPED | OUTPATIENT
Start: 2024-06-21

## 2024-06-21 ASSESSMENT — ACTIVITIES OF DAILY LIVING (ADL)
BATHING ASSESSED: 1
SHOPPING: DEPENDENT
GROOMING ASSESSED: 1
TOILETING: 1
CURRENT_FUNCTION: CONTACT GUARD ASSIST
FEEDING: INDEPENDENT
PHYSICAL TRANSFERS ASSESSED: 1
LAUNDRY ASSESSED: 1
DRESSING_UB_CURRENT_FUNCTION: STAND BY ASSIST
BATHING_CURRENT_FUNCTION: MINIMUM ASSIST
LAUNDRY: DEPENDENT
LIGHT HOUSEKEEPING: DEPENDENT
HOUSEKEEPING ASSESSED: 1
FEEDING ASSESSED: 1
PREPARING MEALS: DEPENDENT
GROOMING_CURRENT_FUNCTION: INDEPENDENT
SHOPPING ASSESSED: 1
TRANSPORTATION ASSESSED: 1
TRANSPORTATION: DEPENDENT
TOILETING: CONTACT GUARD ASSIST
DRESSING_LB_CURRENT_FUNCTION: CONTACT GUARD ASSIST

## 2024-06-21 ASSESSMENT — ENCOUNTER SYMPTOMS
PAIN LOCATION - PAIN FREQUENCY: CONSTANT
PAIN LOCATION - PAIN QUALITY: BURNING
PAIN LOCATION: LEFT LEG
PAIN LOCATION: RIGHT FOOT
PAIN LOCATION - PAIN SEVERITY: 10/10
PAIN LOCATION - PAIN SEVERITY: 10/10
LOWEST PAIN SEVERITY IN PAST 24 HOURS: 9/10
PAIN LOCATION - PAIN QUALITY: "
HIGHEST PAIN SEVERITY IN PAST 24 HOURS: 10/10
PAIN LOCATION - PAIN SEVERITY: 10/10
PAIN LOCATION - PAIN QUALITY: "
PAIN LOCATION: RIGHT LEG
PAIN LOCATION - PAIN FREQUENCY: CONSTANT
PAIN LOCATION - PAIN SEVERITY: 10/10
PAIN LOCATION - PAIN QUALITY: "
PAIN LOCATION - PAIN FREQUENCY: CONSTANT
PAIN SEVERITY GOAL: 0/10
SUBJECTIVE PAIN PROGRESSION: UNCHANGED
PAIN LOCATION: LEFT FOOT
PERSON REPORTING PAIN: PATIENT
PAIN: 1
PAIN LOCATION - PAIN FREQUENCY: CONSTANT

## 2024-06-21 NOTE — DOCUMENTATION CLARIFICATION NOTE
"    PATIENT:               CATALINA SCHULER  ACCT #:                  [id number]  MRN:                       [id number]  :                       1940  ADMIT DATE:       2024 9:11 PM  DISCH DATE:        2024 12:28 PM  RESPONDING PROVIDER #:        [id number]          PROVIDER RESPONSE TEXT:    Toxic Encephalopathy 2/2 Poisoning due to inappropriate use of Medrol    CDI QUERY TEXT:    Clarification    Instruction:    Based on your assessment of the patient and the clinical information, please provide the requested documentation by clicking on the appropriate radio button and enter any additional information if prompted.    Question: Please further clarify the type of Encephalopathy as    When answering this query, please exercise your independent professional judgment. The fact that a question is being asked, does not imply that any particular answer is desired or expected.    The patient's clinical indicators include:  Clinical Information: The patient is an 84 year old female who presented to the ED with slurred speech and confusion.    Per the Discharge Summary from 2024 at 10:31 am:  \"Initially suspected to be medication induced (recently started on medrol at urgent care for back pain and family suspects inappropriate usage by pt) vs less likely TIA (given recurrence of AMS and new rash).\"    Per the Progress note from 2024 at 9:11 pm:  \"He was initially a STEMI alert which was negative and now being worked up for metabolic encephalopathy likely infectious with source unknown.\"    Per the H&P from 2024 at 5:26 am:  \"There is a broad differential for this patient; however, given her symptoms of slurred speech and confusion both completely resolved without intervention suspect that this is related to medication side effect (steroids or tizanidine) versus TIA.  Other considerations include infection though without localizing symptoms, fever, or leukocytosis this is less likely.\"    Clinical " Indicators: slurred speech, confusion    Treatment: Steroids and Tizanidine held    Risk Factors: medrol and tizanidine use  Options provided:  -- Toxic Encephalopathy 2/2 Poisoning due to inappropriate use of Medrol  -- Toxic Encephalopathy 2/2 Adverse Effect of Tizanidine  -- Other - I will add my own diagnosis  -- Refer to Clinical Documentation Reviewer    Query created by: Liz Noguera on 6/19/2024 11:18 AM      Electronically signed by:  MONSE BELLO MD 6/21/2024 11:42 AM

## 2024-06-22 ENCOUNTER — TELEPHONE (OUTPATIENT)
Dept: DERMATOLOGY | Facility: HOSPITAL | Age: 84
End: 2024-06-22

## 2024-06-22 ASSESSMENT — ACTIVITIES OF DAILY LIVING (ADL)
ORAL_CARE_CURRENT_FUNCTION: INDEPENDENT
ORAL_CARE_ASSESSED: 1
TELEPHONE USE ASSESSED: 1
USING THE TELPHONE: INDEPENDENT

## 2024-06-24 ENCOUNTER — HOME CARE VISIT (OUTPATIENT)
Dept: HOME HEALTH SERVICES | Facility: HOME HEALTH | Age: 84
End: 2024-06-24
Payer: MEDICARE

## 2024-06-24 VITALS — OXYGEN SATURATION: 99 % | HEART RATE: 88 BPM | RESPIRATION RATE: 14 BRPM | TEMPERATURE: 98.6 F

## 2024-06-24 PROCEDURE — G0151 HHCP-SERV OF PT,EA 15 MIN: HCPCS

## 2024-06-24 SDOH — HEALTH STABILITY: PHYSICAL HEALTH: EXERCISE TYPE: INITIATED AND UPDATED HEP

## 2024-06-24 ASSESSMENT — ENCOUNTER SYMPTOMS
MUSCLE WEAKNESS: 1
PAIN LOCATION: GENERALIZED
PAIN: 1
PERSON REPORTING PAIN: PATIENT
PAIN LOCATION - PAIN QUALITY: ACHING
AGITATION: 1
HIGHEST PAIN SEVERITY IN PAST 24 HOURS: 5/10
SUBJECTIVE PAIN PROGRESSION: GRADUALLY IMPROVING
PAIN SEVERITY GOAL: 0/10
ARTHRALGIAS: 1
LOWEST PAIN SEVERITY IN PAST 24 HOURS: 3/10
PAIN LOCATION - PAIN SEVERITY: 5/10

## 2024-06-24 ASSESSMENT — BALANCE ASSESSMENTS
TURNING 360 DEGREES STEPS: 0 - DISCONTINUOUS STEPS
ARISES: 1 - ABLE, USES ARMS TO HELP
SITTING DOWN: 1 - USES ARMS OR NOT SMOOTH MOTION
EYES CLOSED AT MAXIMUM POSITION NUDGED: 0 - UNSTEADY
IMMEDIATE STANDING BALANCE FIRST 5 SECONDS: 1 - STEADY BUT USES WALKER OR OTHER SUPPORT
ARISING SCORE: 1
NUDGED SCORE: 1
BALANCE SCORE: 7
NUDGED: 1 - STAGGERS, GRABS, CATCHES SELF
STANDING BALANCE: 0 - UNSTEADY
ATTEMPTS TO ARISE: 2 - ABLE TO RISE, ONE ATTEMPT
SITTING BALANCE: 1 - STEADY, SAFE

## 2024-06-24 ASSESSMENT — GAIT ASSESSMENTS
WALKING STANCE: 1 - HEELS ALMOST TOUCHING WHILE WALKING
PATH: 1 - MILD/MODERATE DEVIATION OR USES WALKING AID
BALANCE AND GAIT SCORE: 14
STEP SYMMETRY: 0 - RIGHT AND LEFT STEP LENGTH NOT EQUAL
TRUNK: 0 - MARKED SWAY OR USES WALKING AID
STEP CONTINUITY: 1 - STEPS APPEAR CONTINUOUS
INITIATION OF GAIT IMMEDIATELY AFTER GO: 0 - ANY HESITANCY OR MULTIPLE ATTEMPTS TO START
TRUNK SCORE: 0
GAIT SCORE: 7
PATH SCORE: 1

## 2024-06-24 ASSESSMENT — PAIN SCALES - PAIN ASSESSMENT IN ADVANCED DEMENTIA (PAINAD)
BODYLANGUAGE: 1 - TENSE. DISTRESSED PACING. FIDGETING.
BODYLANGUAGE: 1
TOTALSCORE: 4
CONSOLABILITY: 0 - NO NEED TO CONSOLE.
NEGVOCALIZATION: 1
FACIALEXPRESSION: 2
FACIALEXPRESSION: 2 - FACIAL GRIMACING.
BREATHING: 0
CONSOLABILITY: 0
NEGVOCALIZATION: 1 - OCCASIONAL MOAN OR GROAN. LOW-LEVEL SPEECH WITH A NEGATIVE OR DISAPPROVING QUALITY.

## 2024-06-24 ASSESSMENT — ACTIVITIES OF DAILY LIVING (ADL)
AMBULATION ASSISTANCE ON FLAT SURFACES: 1
CURRENT_FUNCTION: STAND BY ASSIST
AMBULATION ASSISTANCE: STAND BY ASSIST

## 2024-06-26 ENCOUNTER — HOME CARE VISIT (OUTPATIENT)
Dept: HOME HEALTH SERVICES | Facility: HOME HEALTH | Age: 84
End: 2024-06-26
Payer: MEDICARE

## 2024-06-26 VITALS — TEMPERATURE: 98.6 F | OXYGEN SATURATION: 98 % | RESPIRATION RATE: 14 BRPM | HEART RATE: 68 BPM

## 2024-06-26 PROCEDURE — G0151 HHCP-SERV OF PT,EA 15 MIN: HCPCS

## 2024-06-26 SDOH — HEALTH STABILITY: PHYSICAL HEALTH: EXERCISE TYPE: GENERAL STRENGTH AND BALANCE ACTIVITIES

## 2024-06-26 ASSESSMENT — PAIN SCALES - PAIN ASSESSMENT IN ADVANCED DEMENTIA (PAINAD)
BODYLANGUAGE: 0 - RELAXED.
NEGVOCALIZATION: 0 - NONE.
BODYLANGUAGE: 0
FACIALEXPRESSION: 0 - SMILING OR INEXPRESSIVE.
FACIALEXPRESSION: 0
NEGVOCALIZATION: 0
TOTALSCORE: 0
CONSOLABILITY: 0 - NO NEED TO CONSOLE.
CONSOLABILITY: 0
BREATHING: 0

## 2024-06-26 ASSESSMENT — ENCOUNTER SYMPTOMS
OCCASIONAL FEELINGS OF UNSTEADINESS: 0
MUSCLE WEAKNESS: 1
DENIES PAIN: 1
PERSON REPORTING PAIN: PATIENT

## 2024-06-26 ASSESSMENT — ACTIVITIES OF DAILY LIVING (ADL)
AMBULATION ASSISTANCE ON FLAT SURFACES: 1
AMBULATION ASSISTANCE: STAND BY ASSIST

## 2024-06-28 ENCOUNTER — LAB (OUTPATIENT)
Dept: LAB | Facility: LAB | Age: 84
End: 2024-06-28
Payer: MEDICARE

## 2024-06-28 ENCOUNTER — HOME CARE VISIT (OUTPATIENT)
Dept: HOME HEALTH SERVICES | Facility: HOME HEALTH | Age: 84
End: 2024-06-28
Payer: MEDICARE

## 2024-06-28 DIAGNOSIS — D72.12 DRESS SYNDROME: ICD-10-CM

## 2024-06-28 DIAGNOSIS — T50.905A DRESS SYNDROME: ICD-10-CM

## 2024-06-28 DIAGNOSIS — R53.81 PHYSICAL DECONDITIONING: ICD-10-CM

## 2024-06-28 LAB
ALBUMIN SERPL BCP-MCNC: 3.3 G/DL (ref 3.4–5)
ALP SERPL-CCNC: 193 U/L (ref 33–136)
ALT SERPL W P-5'-P-CCNC: 223 U/L (ref 7–45)
ANION GAP SERPL CALC-SCNC: 14 MMOL/L (ref 10–20)
AST SERPL W P-5'-P-CCNC: 46 U/L (ref 9–39)
BASOPHILS # BLD AUTO: 0 X10*3/UL (ref 0–0.1)
BASOPHILS NFR BLD AUTO: 0 %
BILIRUB SERPL-MCNC: 1.3 MG/DL (ref 0–1.2)
BUN SERPL-MCNC: 13 MG/DL (ref 6–23)
CALCIUM SERPL-MCNC: 10.1 MG/DL (ref 8.6–10.6)
CHLORIDE SERPL-SCNC: 108 MMOL/L (ref 98–107)
CO2 SERPL-SCNC: 25 MMOL/L (ref 21–32)
CREAT SERPL-MCNC: 0.69 MG/DL (ref 0.5–1.05)
EGFRCR SERPLBLD CKD-EPI 2021: 86 ML/MIN/1.73M*2
EOSINOPHIL # BLD AUTO: 0 X10*3/UL (ref 0–0.4)
EOSINOPHIL NFR BLD AUTO: 0 %
ERYTHROCYTE [DISTWIDTH] IN BLOOD BY AUTOMATED COUNT: 16.6 % (ref 11.5–14.5)
GLUCOSE SERPL-MCNC: 87 MG/DL (ref 74–99)
HCT VFR BLD AUTO: 27.1 % (ref 36–46)
HGB BLD-MCNC: 9.1 G/DL (ref 12–16)
IMM GRANULOCYTES # BLD AUTO: 0 X10*3/UL (ref 0–0.5)
IMM GRANULOCYTES NFR BLD AUTO: 0 % (ref 0–0.9)
LYMPHOCYTES # BLD AUTO: 1.41 X10*3/UL (ref 0.8–3)
LYMPHOCYTES NFR BLD AUTO: 48.5 %
MCH RBC QN AUTO: 28.8 PG (ref 26–34)
MCHC RBC AUTO-ENTMCNC: 33.6 G/DL (ref 32–36)
MCV RBC AUTO: 86 FL (ref 80–100)
MONOCYTES # BLD AUTO: 0.18 X10*3/UL (ref 0.05–0.8)
MONOCYTES NFR BLD AUTO: 6.2 %
NEUTROPHILS # BLD AUTO: 1.32 X10*3/UL (ref 1.6–5.5)
NEUTROPHILS NFR BLD AUTO: 45.3 %
NRBC BLD-RTO: 0 /100 WBCS (ref 0–0)
PLATELET # BLD AUTO: 209 X10*3/UL (ref 150–450)
POTASSIUM SERPL-SCNC: 3.7 MMOL/L (ref 3.5–5.3)
PROT SERPL-MCNC: 5.7 G/DL (ref 6.4–8.2)
RBC # BLD AUTO: 3.16 X10*6/UL (ref 4–5.2)
SODIUM SERPL-SCNC: 143 MMOL/L (ref 136–145)
WBC # BLD AUTO: 2.9 X10*3/UL (ref 4.4–11.3)

## 2024-06-28 PROCEDURE — 80053 COMPREHEN METABOLIC PANEL: CPT

## 2024-06-28 PROCEDURE — 36415 COLL VENOUS BLD VENIPUNCTURE: CPT

## 2024-06-28 PROCEDURE — 85025 COMPLETE CBC W/AUTO DIFF WBC: CPT

## 2024-06-28 PROCEDURE — G0152 HHCP-SERV OF OT,EA 15 MIN: HCPCS

## 2024-06-28 RX ORDER — PREDNISONE 10 MG/1
TABLET ORAL
Qty: 10 TABLET | Refills: 0 | Status: SHIPPED | OUTPATIENT
Start: 2024-06-30

## 2024-06-28 ASSESSMENT — ENCOUNTER SYMPTOMS
PAIN LOCATION - PAIN QUALITY: ACHE
PAIN LOCATION - PAIN QUALITY: ACHE
PAIN: 1
PAIN LOCATION - PAIN FREQUENCY: CONSTANT
PAIN LOCATION: LEFT FOOT
PAIN LOCATION - PAIN FREQUENCY: CONSTANT
PAIN LOCATION - PAIN SEVERITY: 3/10
PAIN LOCATION - PAIN SEVERITY: 5/10
PAIN LOCATION - PAIN QUALITY: ACHE
PAIN LOCATION - PAIN FREQUENCY: CONSTANT
PAIN LOCATION: RIGHT LEG
PAIN LOCATION - PAIN SEVERITY: 3/10
PAIN LOCATION: RIGHT FOOT
PAIN LOCATION - PAIN FREQUENCY: CONSTANT
PAIN LOCATION - PAIN QUALITY: ACHE
PAIN LOCATION: LEFT KNEE
PERSON REPORTING PAIN: PATIENT
PAIN LOCATION - PAIN SEVERITY: 5/10

## 2024-06-28 ASSESSMENT — ACTIVITIES OF DAILY LIVING (ADL)
TELEPHONE USE ASSESSED: 1
ORAL_CARE_CURRENT_FUNCTION: DEPENDENT
DRESSING_UB_CURRENT_FUNCTION: STAND BY ASSIST
LAUNDRY ASSESSED: 1
BATHING_CURRENT_FUNCTION: MINIMUM ASSIST
ORAL_CARE_ASSESSED: 1
TOILETING: 1
TRANSPORTATION: DEPENDENT
BATHING ASSESSED: 1
FEEDING ASSESSED: 1
TRANSPORTATION ASSESSED: 1
DRESSING_LB_CURRENT_FUNCTION: STAND BY ASSIST
GROOMING ASSESSED: 1
USING THE TELPHONE: DEPENDENT
LAUNDRY: DEPENDENT
SHOPPING: DEPENDENT
CURRENT_FUNCTION: MINIMUM ASSIST
FEEDING: INDEPENDENT
PHYSICAL TRANSFERS ASSESSED: 1
SHOPPING ASSESSED: 1
LIGHT HOUSEKEEPING: DEPENDENT
PREPARING MEALS: NEEDS ASSISTANCE
CURRENT_FUNCTION: CONTACT GUARD ASSIST
GROOMING_CURRENT_FUNCTION: STAND BY ASSIST
TOILETING: INDEPENDENT
HOUSEKEEPING ASSESSED: 1

## 2024-06-29 ASSESSMENT — ENCOUNTER SYMPTOMS: SUBJECTIVE PAIN PROGRESSION: WAXING AND WANING

## 2024-07-01 ENCOUNTER — TELEPHONE (OUTPATIENT)
Dept: DERMATOLOGY | Facility: HOSPITAL | Age: 84
End: 2024-07-01
Payer: MEDICARE

## 2024-07-01 ENCOUNTER — HOME CARE VISIT (OUTPATIENT)
Dept: HOME HEALTH SERVICES | Facility: HOME HEALTH | Age: 84
End: 2024-07-01
Payer: MEDICARE

## 2024-07-01 VITALS — TEMPERATURE: 98.6 F | RESPIRATION RATE: 16 BRPM | OXYGEN SATURATION: 100 % | HEART RATE: 78 BPM

## 2024-07-01 PROCEDURE — G0151 HHCP-SERV OF PT,EA 15 MIN: HCPCS

## 2024-07-01 SDOH — HEALTH STABILITY: PHYSICAL HEALTH: EXERCISE TYPE: GENERAL STRENGTHENING TO MAJOR MUSCLE GROUPS

## 2024-07-01 ASSESSMENT — ACTIVITIES OF DAILY LIVING (ADL)
AMBULATION ASSISTANCE ON UNEVEN SURFACES: 1
AMBULATION ASSISTANCE ON FLAT SURFACES: 1
AMBULATION ASSISTANCE: STAND BY ASSIST
CURRENT_FUNCTION: STAND BY ASSIST

## 2024-07-01 ASSESSMENT — ENCOUNTER SYMPTOMS
MUSCLE WEAKNESS: 1
OCCASIONAL FEELINGS OF UNSTEADINESS: 0
DEPRESSION: 0
DENIES PAIN: 1
ARTHRALGIAS: 1
PERSON REPORTING PAIN: PATIENT
LOSS OF SENSATION IN FEET: 0

## 2024-07-01 NOTE — TELEPHONE ENCOUNTER
Called patient for update on rash and symptoms and left a message. Called patient's daughter, Aruna at [phone] who reported that her mother's skin is not as scaly and there is no rash present. She feels fine except for gum pain but has a dentist appointment tomorrow.    Labs ordered by PCP completed 6/28/24 note an increased ALT at 223 (increased from 77 2 weeks prior). Patient is currently taking prednisone 20 mg through 7/4 and due to not endorsing symptoms, will continue this dose and adjust as necessary at follow up appointment on 7/5 with Dr. Mullins. Will also discuss follow up lab monitoring at visit.

## 2024-07-02 DIAGNOSIS — R79.89 ELEVATED LFTS: Primary | ICD-10-CM

## 2024-07-03 ENCOUNTER — HOME CARE VISIT (OUTPATIENT)
Dept: HOME HEALTH SERVICES | Facility: HOME HEALTH | Age: 84
End: 2024-07-03
Payer: MEDICARE

## 2024-07-03 VITALS — HEART RATE: 86 BPM | RESPIRATION RATE: 14 BRPM | TEMPERATURE: 98.6 F | OXYGEN SATURATION: 99 %

## 2024-07-03 PROCEDURE — G0151 HHCP-SERV OF PT,EA 15 MIN: HCPCS

## 2024-07-03 PROCEDURE — G0152 HHCP-SERV OF OT,EA 15 MIN: HCPCS

## 2024-07-03 SDOH — HEALTH STABILITY: PHYSICAL HEALTH: EXERCISE TYPE: GENERAL STRENGTHENING AND BALANCE TRAINING

## 2024-07-03 ASSESSMENT — ENCOUNTER SYMPTOMS
ARTHRALGIAS: 1
HIGHEST PAIN SEVERITY IN PAST 24 HOURS: 7/10
PAIN LOCATION - PAIN QUALITY: NEUROPATHIC
PAIN LOCATION: MOUTH
PERSON REPORTING PAIN: PATIENT
PAIN SEVERITY GOAL: 0/10
PAIN LOCATION: RIGHT FOOT
PAIN LOCATION - PAIN FREQUENCY: CONSTANT
PAIN LOCATION - PAIN FREQUENCY: CONSTANT
PAIN SEVERITY GOAL: 0/10
LOWEST PAIN SEVERITY IN PAST 24 HOURS: 2/10
PAIN LOCATION - PAIN SEVERITY: 5/10
SUBJECTIVE PAIN PROGRESSION: WAXING AND WANING
LOWEST PAIN SEVERITY IN PAST 24 HOURS: 4/10
PAIN LOCATION - PAIN FREQUENCY: CONSTANT
LOSS OF SENSATION IN FEET: 1
PAIN: 1
PAIN LOCATION: LEFT FOOT
PAIN LOCATION: LEFT FOOT
HIGHEST PAIN SEVERITY IN PAST 24 HOURS: 4/10
PAIN LOCATION: RIGHT FOOT
PAIN LOCATION - PAIN QUALITY: SORENESS
SUBJECTIVE PAIN PROGRESSION: GRADUALLY IMPROVING
PAIN LOCATION - PAIN QUALITY: "
OCCASIONAL FEELINGS OF UNSTEADINESS: 1
MUSCLE WEAKNESS: 1
DEPRESSION: 0
PAIN LOCATION - PAIN SEVERITY: 4/10
PAIN LOCATION - PAIN SEVERITY: 5/10

## 2024-07-03 ASSESSMENT — PAIN SCALES - PAIN ASSESSMENT IN ADVANCED DEMENTIA (PAINAD)
FACIALEXPRESSION: 0
BODYLANGUAGE: 0 - RELAXED.
CONSOLABILITY: 0 - NO NEED TO CONSOLE.
TOTALSCORE: 2
FACIALEXPRESSION: 0 - SMILING OR INEXPRESSIVE.
BODYLANGUAGE: 0
NEGVOCALIZATION: 1 - OCCASIONAL MOAN OR GROAN. LOW-LEVEL SPEECH WITH A NEGATIVE OR DISAPPROVING QUALITY.
CONSOLABILITY: 0
BREATHING: 1
NEGVOCALIZATION: 1

## 2024-07-03 ASSESSMENT — ACTIVITIES OF DAILY LIVING (ADL)
AMBULATION ASSISTANCE ON UNEVEN SURFACES: 1
AMBULATION ASSISTANCE: STAND BY ASSIST
CURRENT_FUNCTION: STAND BY ASSIST
AMBULATION ASSISTANCE ON FLAT SURFACES: 1

## 2024-07-04 ASSESSMENT — ACTIVITIES OF DAILY LIVING (ADL)
BATHING ASSESSED: 1
BATHING_CURRENT_FUNCTION: SUPERVISION

## 2024-07-05 ENCOUNTER — APPOINTMENT (OUTPATIENT)
Dept: DERMATOLOGY | Facility: CLINIC | Age: 84
End: 2024-07-05
Payer: MEDICARE

## 2024-07-05 ENCOUNTER — LAB (OUTPATIENT)
Dept: LAB | Facility: LAB | Age: 84
End: 2024-07-05
Payer: MEDICARE

## 2024-07-05 DIAGNOSIS — L29.9 PRURITUS, UNSPECIFIED: ICD-10-CM

## 2024-07-05 DIAGNOSIS — Z48.00 ENCOUNTER FOR CHANGE OR REMOVAL OF NONSURGICAL WOUND DRESSING: ICD-10-CM

## 2024-07-05 DIAGNOSIS — Z48.00 ENCOUNTER FOR CHANGE OR REMOVAL OF NONSURGICAL WOUND DRESSING: Primary | ICD-10-CM

## 2024-07-05 DIAGNOSIS — L21.9 SEBORRHEIC DERMATITIS: ICD-10-CM

## 2024-07-05 LAB
ALBUMIN SERPL BCP-MCNC: 3.4 G/DL (ref 3.4–5)
ALP SERPL-CCNC: 124 U/L (ref 33–136)
ALT SERPL W P-5'-P-CCNC: 43 U/L (ref 7–45)
ANION GAP SERPL CALC-SCNC: 13 MMOL/L (ref 10–20)
AST SERPL W P-5'-P-CCNC: 17 U/L (ref 9–39)
BILIRUB SERPL-MCNC: 1.2 MG/DL (ref 0–1.2)
BUN SERPL-MCNC: 10 MG/DL (ref 6–23)
CALCIUM SERPL-MCNC: 11.1 MG/DL (ref 8.6–10.6)
CHLORIDE SERPL-SCNC: 105 MMOL/L (ref 98–107)
CO2 SERPL-SCNC: 26 MMOL/L (ref 21–32)
CREAT SERPL-MCNC: 0.62 MG/DL (ref 0.5–1.05)
EGFRCR SERPLBLD CKD-EPI 2021: 88 ML/MIN/1.73M*2
GLUCOSE SERPL-MCNC: 98 MG/DL (ref 74–99)
POTASSIUM SERPL-SCNC: 4.1 MMOL/L (ref 3.5–5.3)
PROT SERPL-MCNC: 6 G/DL (ref 6.4–8.2)
SODIUM SERPL-SCNC: 140 MMOL/L (ref 136–145)
TSH SERPL-ACNC: 1.22 MIU/L (ref 0.44–3.98)

## 2024-07-05 PROCEDURE — 36415 COLL VENOUS BLD VENIPUNCTURE: CPT

## 2024-07-05 PROCEDURE — 1111F DSCHRG MED/CURRENT MED MERGE: CPT | Performed by: STUDENT IN AN ORGANIZED HEALTH CARE EDUCATION/TRAINING PROGRAM

## 2024-07-05 PROCEDURE — 84443 ASSAY THYROID STIM HORMONE: CPT

## 2024-07-05 PROCEDURE — 99214 OFFICE O/P EST MOD 30 MIN: CPT | Performed by: STUDENT IN AN ORGANIZED HEALTH CARE EDUCATION/TRAINING PROGRAM

## 2024-07-05 PROCEDURE — 80053 COMPREHEN METABOLIC PANEL: CPT

## 2024-07-05 RX ORDER — TRIAMCINOLONE ACETONIDE 1 MG/G
OINTMENT TOPICAL DAILY
Qty: 453 G | Refills: 0 | Status: SHIPPED | OUTPATIENT
Start: 2024-07-05

## 2024-07-05 ASSESSMENT — DERMATOLOGY QUALITY OF LIFE (QOL) ASSESSMENT
RATE HOW BOTHERED YOU ARE BY EFFECTS OF YOUR SKIN PROBLEMS ON YOUR ACTIVITIES (EG, GOING OUT, ACCOMPLISHING WHAT YOU WANT, WORK ACTIVITIES OR YOUR RELATIONSHIPS WITH OTHERS): 6 - ALWAYS BOTHERED
WHAT SINGLE SKIN CONDITION LISTED BELOW IS THE PATIENT ANSWERING THE QUALITY-OF-LIFE ASSESSMENT QUESTIONS ABOUT: NONE OF THE ABOVE
DATE THE QUALITY-OF-LIFE ASSESSMENT WAS COMPLETED: 67026
RATE HOW BOTHERED YOU ARE BY SYMPTOMS OF YOUR SKIN PROBLEM (EG, ITCHING, STINGING BURNING, HURTING OR SKIN IRRITATION): 6 - ALWAYS BOTHERED
RATE HOW EMOTIONALLY BOTHERED YOU ARE BY YOUR SKIN PROBLEM (FOR EXAMPLE, WORRY, EMBARRASSMENT, FRUSTRATION): 6 - ALWAYS BOTHERED
ARE THERE EXCLUSIONS OR EXCEPTIONS FOR THE QUALITY OF LIFE ASSESSMENT: NO

## 2024-07-05 ASSESSMENT — PATIENT GLOBAL ASSESSMENT (PGA): PATIENT GLOBAL ASSESSMENT: PATIENT GLOBAL ASSESSMENT:  2 - MILD

## 2024-07-05 ASSESSMENT — DERMATOLOGY PATIENT ASSESSMENT
DO YOU HAVE ANY NEW OR CHANGING LESIONS: YES
DO YOU USE A TANNING BED: NO
ARE YOU ON BIRTH CONTROL: NO
ARE YOU AN ORGAN TRANSPLANT RECIPIENT: NO
DO YOU HAVE IRREGULAR MENSTRUAL CYCLES: NO
HAVE YOU HAD OR DO YOU HAVE VASCULAR DISEASE: NO
HAVE YOU HAD OR DO YOU HAVE A STAPH INFECTION: NO
ARE YOU TRYING TO GET PREGNANT: NO
DO YOU USE SUNSCREEN: OCCASIONALLY

## 2024-07-05 ASSESSMENT — ITCH NUMERIC RATING SCALE: HOW SEVERE IS YOUR ITCHING?: 9

## 2024-07-05 NOTE — PROGRESS NOTES
Subjective     Kerry Arevalo is a 84 y.o. female who presents for the following: DRESS (Severe medication reaction. Pt accompanied by daughter).   Kerry Arevalo is a 84 y.o. female with a past medical history of HTN, T2DM, OA, RA, and spinal stenosis c/b lumbar radiculopathy presented on 6/7/2024 with slurred speech and altered mental status and was admitted for workup of confusion . Dermatology was consulted for rash on July 8    Recently hospitalization for DRESS  Drug Reaction with Eosinophilia and Systemic Symptoms (DRESS) 2/2 sulfasalazine.   Kerry's rash has improved on oral prednisone 50mg and topical triamcinolone 0.1% ointment. Significantly less edema noted on 6/12, lesions have evolved into erythematous patches. Liver enzymes have continued to trend down   Patient was started on slow taper of prednisone  Tomorrow is her last day of predisone      Review of Systems:  No other skin or systemic complaints other than what is documented elsewhere in the note.    The following portions of the chart were reviewed this encounter and updated as appropriate:          Skin Cancer History  No skin cancer on file.      Specialty Problems    None       Objective   Well appearing patient in no apparent distress; mood and affect are within normal limits.    A focused skin examination was performed. All findings within normal limits unless otherwise noted below.    Assessment/Plan   1. Encounter for change or removal of nonsurgical wound dressing    Related Procedures  Thyroid Stimulating Hormone  Comprehensive metabolic panel    2. Pruritus, unspecified/DRESS    On exam, fading erythema on trunk/extremities  No lymphadenopathy  Patient scratches arms during exam    Related Procedures  Thyroid Stimulating Hormone  Comprehensive metabolic panel  Her pruritus is still moderate to severe   I asked she get liver enzymes today and then we will check TSH in 2-3 weeks   No other systemic symptoms  The itch is the main symptom  at this time    I dont think additional doses of prednisone are needed, especially if her rash is improving  If her itch continues to be moderate or severe, I am happy to re load prednisone but dont think will be necessary  Recommended to use triamciniolone ointment daily for 2-3 weeks if needed- she was worried about using it every day but I Explained it should be ok for a few more weeks especially if she is so very itchy    It seems that your skin is still recovering from the drug rash  This sort of rash DRESS does take about 6 weeks to fully go away  and so this is likely why you are still experiencing itch  The redness must be getter better, but its fully gone yet  It may take a few more weeks  I suspect the itch would fully go away in a few weeks  Please check your liver enzymes today by going to the lab  In 2 weeks, please check your thyroid hormone by again going to the lab  Finish your prednisone pills as prescribed  Keep using the triamcinolone ointment- I know its been already 2 weeks but you can keep using it longer for another 2 weeks given how itchy you are  If the itch is intolerable,please message me and ill talk to you about an alternative treatment  Please consider also using a zyrtec in the morning in addition to the Singulair    She will be seing rheum since she cannot take sulfasalazine any longer due to potentially it having caused DRESS

## 2024-07-05 NOTE — PATIENT INSTRUCTIONS
It seems that your skin is still recovering from the drug rash  This sort of rash DRESS does take about 6 weeks to fully go away  and so this is likely why you are still experiencing itch  The redness must be getter better, but its fully gone yet  It may take a few more weeks  I suspect the itch would fully go away in a few weeks  Please check your liver enzymes today by going to the lab  In 2 weeks, please check your thyroid hormone by again going to the lab  Finish your prednisone pills as prescribed  Keep using the triamcinolone ointment- I know its been already 2 weeks but you can keep using it longer for another 2 weeks given how itchy you are  If the itch is intolerable,please message me and ill talk to you about an alternative treatment  Please consider also using a zyrtec in the morning in addition to the Singulair  Please use pramoxine lotion once daily for itch

## 2024-07-08 ENCOUNTER — HOME CARE VISIT (OUTPATIENT)
Dept: HOME HEALTH SERVICES | Facility: HOME HEALTH | Age: 84
End: 2024-07-08
Payer: MEDICARE

## 2024-07-08 VITALS — OXYGEN SATURATION: 98 % | TEMPERATURE: 98.6 F | RESPIRATION RATE: 14 BRPM | HEART RATE: 72 BPM

## 2024-07-08 PROCEDURE — G0151 HHCP-SERV OF PT,EA 15 MIN: HCPCS

## 2024-07-08 SDOH — HEALTH STABILITY: PHYSICAL HEALTH: EXERCISE TYPE: PROFICIENT IN HEP,STRENGTHENING PROGRAM

## 2024-07-08 ASSESSMENT — PAIN SCALES - PAIN ASSESSMENT IN ADVANCED DEMENTIA (PAINAD)
BREATHING: 0
NEGVOCALIZATION: 0 - NONE.
BODYLANGUAGE: 0
NEGVOCALIZATION: 0
FACIALEXPRESSION: 0 - SMILING OR INEXPRESSIVE.
FACIALEXPRESSION: 0
TOTALSCORE: 0
CONSOLABILITY: 0 - NO NEED TO CONSOLE.
CONSOLABILITY: 0
BODYLANGUAGE: 0 - RELAXED.

## 2024-07-08 ASSESSMENT — ENCOUNTER SYMPTOMS
DEPRESSION: 0
OCCASIONAL FEELINGS OF UNSTEADINESS: 0
HIGHEST PAIN SEVERITY IN PAST 24 HOURS: 4/10
MUSCLE WEAKNESS: 1
PAIN LOCATION - PAIN SEVERITY: 3/10
PAIN LOCATION - PAIN FREQUENCY: INFREQUENT
PAIN LOCATION - PAIN QUALITY: BOTH FEET
PAIN: 1
PAIN SEVERITY GOAL: 0/10
LOSS OF SENSATION IN FEET: 1
PERSON REPORTING PAIN: PATIENT
PAIN LOCATION: GENERALIZED
LOWEST PAIN SEVERITY IN PAST 24 HOURS: 3/10
SUBJECTIVE PAIN PROGRESSION: GRADUALLY IMPROVING

## 2024-07-08 ASSESSMENT — ACTIVITIES OF DAILY LIVING (ADL)
CURRENT_FUNCTION: STAND BY ASSIST
AMBULATION ASSISTANCE: STAND BY ASSIST

## 2024-07-08 NOTE — CASE COMMUNICATION
Patient was seen in home for PT discipline discharge.Patient has met goals,motivated to continue HEP,walking program with family..

## 2024-07-10 ENCOUNTER — HOME CARE VISIT (OUTPATIENT)
Dept: HOME HEALTH SERVICES | Facility: HOME HEALTH | Age: 84
End: 2024-07-10
Payer: MEDICARE

## 2024-07-10 PROCEDURE — G0152 HHCP-SERV OF OT,EA 15 MIN: HCPCS

## 2024-07-10 ASSESSMENT — ENCOUNTER SYMPTOMS
LOWEST PAIN SEVERITY IN PAST 24 HOURS: 5/10
PAIN LOCATION: RIGHT LEG
PERSON REPORTING PAIN: PATIENT
PAIN LOCATION - PAIN SEVERITY: 5/10
PAIN LOCATION - PAIN SEVERITY: 5/10
PAIN LOCATION: LEFT LEG
PAIN LOCATION - PAIN SEVERITY: 5/10
PAIN LOCATION: LEFT FOOT
PAIN SEVERITY GOAL: 0/10
PAIN LOCATION - PAIN SEVERITY: 5/10
PAIN: 1
PAIN LOCATION: RIGHT FOOT
HIGHEST PAIN SEVERITY IN PAST 24 HOURS: 5/10
SUBJECTIVE PAIN PROGRESSION: UNCHANGED

## 2024-07-11 ASSESSMENT — ACTIVITIES OF DAILY LIVING (ADL)
AMBULATION ASSISTANCE: 1
TOILETING: INDEPENDENT
ORAL_CARE_CURRENT_FUNCTION: INDEPENDENT
SHOPPING: DEPENDENT
LIGHT HOUSEKEEPING: NEEDS ASSISTANCE
DRESSING_LB_CURRENT_FUNCTION: INDEPENDENT
FEEDING ASSESSED: 1
FEEDING: INDEPENDENT
ORAL_CARE_ASSESSED: 1
LAUNDRY: DEPENDENT
GROOMING_CURRENT_FUNCTION: INDEPENDENT
GROOMING ASSESSED: 1
BATHING_CURRENT_FUNCTION: INDEPENDENT
CURRENT_FUNCTION: CONTACT GUARD ASSIST
BATHING ASSESSED: 1
PREPARING MEALS: NEEDS ASSISTANCE
HOUSEKEEPING ASSESSED: 1
SHOPPING ASSESSED: 1
USING THE TELPHONE: INDEPENDENT
TRANSPORTATION: DEPENDENT
AMBULATION ASSISTANCE: CONTACT GUARD ASSIST
LAUNDRY ASSESSED: 1
DRESSING_UB_CURRENT_FUNCTION: INDEPENDENT
TRANSPORTATION ASSESSED: 1
TOILETING: 1
TELEPHONE USE ASSESSED: 1
PHYSICAL TRANSFERS ASSESSED: 1

## 2024-07-15 DIAGNOSIS — R79.89 ELEVATED LFTS: ICD-10-CM

## 2024-07-16 ENCOUNTER — HOME CARE VISIT (OUTPATIENT)
Dept: HOME HEALTH SERVICES | Facility: HOME HEALTH | Age: 84
End: 2024-07-16
Payer: MEDICARE

## 2024-07-16 PROCEDURE — G0152 HHCP-SERV OF OT,EA 15 MIN: HCPCS

## 2024-07-17 ASSESSMENT — ENCOUNTER SYMPTOMS
SUBJECTIVE PAIN PROGRESSION: WAXING AND WANING
PAIN: 1
PERSON REPORTING PAIN: PATIENT
PAIN SEVERITY GOAL: 0/10
PAIN LOCATION: LEFT FOOT
PAIN LOCATION: RIGHT FOOT
HIGHEST PAIN SEVERITY IN PAST 24 HOURS: 10/10
PAIN LOCATION - PAIN SEVERITY: 9/10
LOWEST PAIN SEVERITY IN PAST 24 HOURS: 8/10

## 2024-07-18 ENCOUNTER — LAB (OUTPATIENT)
Dept: LAB | Facility: LAB | Age: 84
End: 2024-07-18
Payer: MEDICARE

## 2024-07-18 DIAGNOSIS — D72.12 DRESS SYNDROME: ICD-10-CM

## 2024-07-18 DIAGNOSIS — R79.89 ELEVATED LFTS: ICD-10-CM

## 2024-07-18 DIAGNOSIS — T50.905A DRESS SYNDROME: ICD-10-CM

## 2024-07-18 LAB
ALBUMIN SERPL BCP-MCNC: 3.3 G/DL (ref 3.4–5)
ALP SERPL-CCNC: 79 U/L (ref 33–136)
ALT SERPL W P-5'-P-CCNC: 23 U/L (ref 7–45)
ANION GAP SERPL CALC-SCNC: 11 MMOL/L (ref 10–20)
AST SERPL W P-5'-P-CCNC: 26 U/L (ref 9–39)
BASOPHILS # BLD AUTO: 0 X10*3/UL (ref 0–0.1)
BASOPHILS NFR BLD AUTO: 0 %
BILIRUB DIRECT SERPL-MCNC: 0.3 MG/DL (ref 0–0.3)
BILIRUB SERPL-MCNC: 1 MG/DL (ref 0–1.2)
BUN SERPL-MCNC: 12 MG/DL (ref 6–23)
BURR CELLS BLD QL SMEAR: NORMAL
CALCIUM SERPL-MCNC: 11.6 MG/DL (ref 8.6–10.6)
CHLORIDE SERPL-SCNC: 113 MMOL/L (ref 98–107)
CO2 SERPL-SCNC: 28 MMOL/L (ref 21–32)
CREAT SERPL-MCNC: 0.8 MG/DL (ref 0.5–1.05)
EGFRCR SERPLBLD CKD-EPI 2021: 73 ML/MIN/1.73M*2
EOSINOPHIL # BLD AUTO: 0 X10*3/UL (ref 0–0.4)
EOSINOPHIL NFR BLD AUTO: 0 %
ERYTHROCYTE [DISTWIDTH] IN BLOOD BY AUTOMATED COUNT: 20.4 % (ref 11.5–14.5)
GLUCOSE SERPL-MCNC: 163 MG/DL (ref 74–99)
HCT VFR BLD AUTO: 24.1 % (ref 36–46)
HGB BLD-MCNC: 7.6 G/DL (ref 12–16)
IMM GRANULOCYTES # BLD AUTO: 0.02 X10*3/UL (ref 0–0.5)
IMM GRANULOCYTES NFR BLD AUTO: 0.3 % (ref 0–0.9)
LYMPHOCYTES # BLD AUTO: 1.06 X10*3/UL (ref 0.8–3)
LYMPHOCYTES NFR BLD AUTO: 16.2 %
MCH RBC QN AUTO: 29 PG (ref 26–34)
MCHC RBC AUTO-ENTMCNC: 31.5 G/DL (ref 32–36)
MCV RBC AUTO: 92 FL (ref 80–100)
MONOCYTES # BLD AUTO: 0.99 X10*3/UL (ref 0.05–0.8)
MONOCYTES NFR BLD AUTO: 15.1 %
NEUTROPHILS # BLD AUTO: 4.48 X10*3/UL (ref 1.6–5.5)
NEUTROPHILS NFR BLD AUTO: 68.4 %
NRBC BLD-RTO: 0 /100 WBCS (ref 0–0)
OVALOCYTES BLD QL SMEAR: NORMAL
PLATELET # BLD AUTO: 230 X10*3/UL (ref 150–450)
POTASSIUM SERPL-SCNC: 3.8 MMOL/L (ref 3.5–5.3)
PROT SERPL-MCNC: 5.7 G/DL (ref 6.4–8.2)
RBC # BLD AUTO: 2.62 X10*6/UL (ref 4–5.2)
RBC MORPH BLD: NORMAL
SODIUM SERPL-SCNC: 148 MMOL/L (ref 136–145)
WBC # BLD AUTO: 6.6 X10*3/UL (ref 4.4–11.3)

## 2024-07-18 PROCEDURE — 36415 COLL VENOUS BLD VENIPUNCTURE: CPT

## 2024-07-18 PROCEDURE — 85025 COMPLETE CBC W/AUTO DIFF WBC: CPT

## 2024-07-18 PROCEDURE — 82248 BILIRUBIN DIRECT: CPT

## 2024-07-18 PROCEDURE — 80053 COMPREHEN METABOLIC PANEL: CPT

## 2024-07-20 ENCOUNTER — APPOINTMENT (OUTPATIENT)
Dept: RADIOLOGY | Facility: HOSPITAL | Age: 84
End: 2024-07-20
Payer: MEDICARE

## 2024-07-20 ENCOUNTER — HOSPITAL ENCOUNTER (INPATIENT)
Facility: HOSPITAL | Age: 84
LOS: 1 days | Discharge: HOME | End: 2024-07-21
Attending: EMERGENCY MEDICINE | Admitting: INTERNAL MEDICINE
Payer: MEDICARE

## 2024-07-20 ENCOUNTER — APPOINTMENT (OUTPATIENT)
Dept: CARDIOLOGY | Facility: HOSPITAL | Age: 84
End: 2024-07-20
Payer: MEDICARE

## 2024-07-20 DIAGNOSIS — I62.00 SUBDURAL BLEEDING (MULTI): ICD-10-CM

## 2024-07-20 DIAGNOSIS — S09.90XA INJURY OF HEAD, INITIAL ENCOUNTER: ICD-10-CM

## 2024-07-20 DIAGNOSIS — S06.4XAA EPIDURAL HEMATOMA (MULTI): Primary | ICD-10-CM

## 2024-07-20 DIAGNOSIS — E83.42 HYPOMAGNESEMIA: ICD-10-CM

## 2024-07-20 LAB
ABO GROUP (TYPE) IN BLOOD: NORMAL
ALBUMIN SERPL BCP-MCNC: 2.7 G/DL (ref 3.4–5)
ALBUMIN SERPL BCP-MCNC: 3.3 G/DL (ref 3.4–5)
ALP SERPL-CCNC: 69 U/L (ref 33–136)
ALP SERPL-CCNC: 84 U/L (ref 33–136)
ALT SERPL W P-5'-P-CCNC: 18 U/L (ref 7–45)
ALT SERPL W P-5'-P-CCNC: 23 U/L (ref 7–45)
ANION GAP SERPL CALC-SCNC: 12 MMOL/L (ref 10–20)
ANION GAP SERPL CALC-SCNC: 15 MMOL/L (ref 10–20)
ANTIBODY SCREEN: NORMAL
AST SERPL W P-5'-P-CCNC: 26 U/L (ref 9–39)
AST SERPL W P-5'-P-CCNC: 36 U/L (ref 9–39)
BASOPHILS # BLD AUTO: 0 X10*3/UL (ref 0–0.1)
BASOPHILS # BLD AUTO: 0.01 X10*3/UL (ref 0–0.1)
BASOPHILS NFR BLD AUTO: 0 %
BASOPHILS NFR BLD AUTO: 0.1 %
BILIRUB SERPL-MCNC: 0.7 MG/DL (ref 0–1.2)
BILIRUB SERPL-MCNC: 0.9 MG/DL (ref 0–1.2)
BUN SERPL-MCNC: 12 MG/DL (ref 6–23)
BUN SERPL-MCNC: 13 MG/DL (ref 6–23)
CALCIUM SERPL-MCNC: 10.2 MG/DL (ref 8.6–10.3)
CALCIUM SERPL-MCNC: 11.2 MG/DL (ref 8.6–10.3)
CARDIAC TROPONIN I PNL SERPL HS: 7 NG/L (ref 0–13)
CHLORIDE SERPL-SCNC: 110 MMOL/L (ref 98–107)
CHLORIDE SERPL-SCNC: 112 MMOL/L (ref 98–107)
CO2 SERPL-SCNC: 23 MMOL/L (ref 21–32)
CO2 SERPL-SCNC: 23 MMOL/L (ref 21–32)
CREAT SERPL-MCNC: 0.45 MG/DL (ref 0.5–1.05)
CREAT SERPL-MCNC: 0.5 MG/DL (ref 0.5–1.05)
CRP SERPL-MCNC: 2.07 MG/DL
EGFRCR SERPLBLD CKD-EPI 2021: >90 ML/MIN/1.73M*2
EGFRCR SERPLBLD CKD-EPI 2021: >90 ML/MIN/1.73M*2
EOSINOPHIL # BLD AUTO: 0 X10*3/UL (ref 0–0.4)
EOSINOPHIL # BLD AUTO: 0 X10*3/UL (ref 0–0.4)
EOSINOPHIL NFR BLD AUTO: 0 %
EOSINOPHIL NFR BLD AUTO: 0 %
ERYTHROCYTE [DISTWIDTH] IN BLOOD BY AUTOMATED COUNT: 20.9 % (ref 11.5–14.5)
ERYTHROCYTE [DISTWIDTH] IN BLOOD BY AUTOMATED COUNT: 21 % (ref 11.5–14.5)
ERYTHROCYTE [DISTWIDTH] IN BLOOD BY AUTOMATED COUNT: 21.5 % (ref 11.5–14.5)
EST. AVERAGE GLUCOSE BLD GHB EST-MCNC: 108 MG/DL
GLUCOSE SERPL-MCNC: 103 MG/DL (ref 74–99)
GLUCOSE SERPL-MCNC: 85 MG/DL (ref 74–99)
HBA1C MFR BLD: 5.4 %
HCT VFR BLD AUTO: 21.5 % (ref 36–46)
HCT VFR BLD AUTO: 24.2 % (ref 36–46)
HCT VFR BLD AUTO: 24.9 % (ref 36–46)
HGB BLD-MCNC: 6.9 G/DL (ref 12–16)
HGB BLD-MCNC: 7.5 G/DL (ref 12–16)
HGB BLD-MCNC: 7.8 G/DL (ref 12–16)
HYPOCHROMIA BLD QL SMEAR: NORMAL
IMM GRANULOCYTES # BLD AUTO: 0.02 X10*3/UL (ref 0–0.5)
IMM GRANULOCYTES # BLD AUTO: 0.03 X10*3/UL (ref 0–0.5)
IMM GRANULOCYTES NFR BLD AUTO: 0.3 % (ref 0–0.9)
IMM GRANULOCYTES NFR BLD AUTO: 0.4 % (ref 0–0.9)
INR PPP: 1.2 (ref 0.9–1.1)
INR PPP: 1.2 (ref 0.9–1.1)
IRON SATN MFR SERPL: 37 % (ref 25–45)
IRON SERPL-MCNC: 80 UG/DL (ref 35–150)
LYMPHOCYTES # BLD AUTO: 1.08 X10*3/UL (ref 0.8–3)
LYMPHOCYTES # BLD AUTO: 1.16 X10*3/UL (ref 0.8–3)
LYMPHOCYTES NFR BLD AUTO: 13.4 %
LYMPHOCYTES NFR BLD AUTO: 16.4 %
MAGNESIUM SERPL-MCNC: 1.4 MG/DL (ref 1.6–2.4)
MAGNESIUM SERPL-MCNC: 1.6 MG/DL (ref 1.6–2.4)
MAGNESIUM SERPL-MCNC: 6.5 MG/DL (ref 1.6–2.4)
MCH RBC QN AUTO: 30 PG (ref 26–34)
MCH RBC QN AUTO: 30.1 PG (ref 26–34)
MCH RBC QN AUTO: 30.1 PG (ref 26–34)
MCHC RBC AUTO-ENTMCNC: 30.1 G/DL (ref 32–36)
MCHC RBC AUTO-ENTMCNC: 32.1 G/DL (ref 32–36)
MCHC RBC AUTO-ENTMCNC: 32.2 G/DL (ref 32–36)
MCV RBC AUTO: 100 FL (ref 80–100)
MCV RBC AUTO: 93 FL (ref 80–100)
MCV RBC AUTO: 94 FL (ref 80–100)
MONOCYTES # BLD AUTO: 1.16 X10*3/UL (ref 0.05–0.8)
MONOCYTES # BLD AUTO: 1.25 X10*3/UL (ref 0.05–0.8)
MONOCYTES NFR BLD AUTO: 14.4 %
MONOCYTES NFR BLD AUTO: 17.7 %
NEUTROPHILS # BLD AUTO: 4.65 X10*3/UL (ref 1.6–5.5)
NEUTROPHILS # BLD AUTO: 5.78 X10*3/UL (ref 1.6–5.5)
NEUTROPHILS NFR BLD AUTO: 65.6 %
NEUTROPHILS NFR BLD AUTO: 71.7 %
NRBC BLD-RTO: 0 /100 WBCS (ref 0–0)
OVALOCYTES BLD QL SMEAR: NORMAL
PLATELET # BLD AUTO: 145 X10*3/UL (ref 150–450)
PLATELET # BLD AUTO: 181 X10*3/UL (ref 150–450)
PLATELET # BLD AUTO: 239 X10*3/UL (ref 150–450)
POTASSIUM SERPL-SCNC: 3.2 MMOL/L (ref 3.5–5.3)
POTASSIUM SERPL-SCNC: 3.6 MMOL/L (ref 3.5–5.3)
PROT SERPL-MCNC: 4.9 G/DL (ref 6.4–8.2)
PROT SERPL-MCNC: 6.1 G/DL (ref 6.4–8.2)
PROTHROMBIN TIME: 13.2 SECONDS (ref 9.8–12.8)
PROTHROMBIN TIME: 13.9 SECONDS (ref 9.8–12.8)
RBC # BLD AUTO: 2.29 X10*6/UL (ref 4–5.2)
RBC # BLD AUTO: 2.5 X10*6/UL (ref 4–5.2)
RBC # BLD AUTO: 2.59 X10*6/UL (ref 4–5.2)
RBC MORPH BLD: NORMAL
RH FACTOR (ANTIGEN D): NORMAL
SODIUM SERPL-SCNC: 144 MMOL/L (ref 136–145)
SODIUM SERPL-SCNC: 144 MMOL/L (ref 136–145)
TIBC SERPL-MCNC: 219 UG/DL (ref 240–445)
TSH SERPL-ACNC: 1.61 MIU/L (ref 0.44–3.98)
UIBC SERPL-MCNC: 139 UG/DL (ref 110–370)
WBC # BLD AUTO: 7.1 X10*3/UL (ref 4.4–11.3)
WBC # BLD AUTO: 7.2 X10*3/UL (ref 4.4–11.3)
WBC # BLD AUTO: 8.1 X10*3/UL (ref 4.4–11.3)

## 2024-07-20 PROCEDURE — 83540 ASSAY OF IRON: CPT | Performed by: INTERNAL MEDICINE

## 2024-07-20 PROCEDURE — 36415 COLL VENOUS BLD VENIPUNCTURE: CPT | Performed by: NURSE PRACTITIONER

## 2024-07-20 PROCEDURE — 85025 COMPLETE CBC W/AUTO DIFF WBC: CPT | Performed by: INTERNAL MEDICINE

## 2024-07-20 PROCEDURE — 85025 COMPLETE CBC W/AUTO DIFF WBC: CPT | Performed by: NURSE PRACTITIONER

## 2024-07-20 PROCEDURE — 86901 BLOOD TYPING SEROLOGIC RH(D): CPT | Performed by: INTERNAL MEDICINE

## 2024-07-20 PROCEDURE — 96366 THER/PROPH/DIAG IV INF ADDON: CPT

## 2024-07-20 PROCEDURE — 96365 THER/PROPH/DIAG IV INF INIT: CPT

## 2024-07-20 PROCEDURE — 85610 PROTHROMBIN TIME: CPT | Performed by: NURSE PRACTITIONER

## 2024-07-20 PROCEDURE — 99285 EMERGENCY DEPT VISIT HI MDM: CPT | Mod: 25

## 2024-07-20 PROCEDURE — 86140 C-REACTIVE PROTEIN: CPT | Performed by: INTERNAL MEDICINE

## 2024-07-20 PROCEDURE — 70450 CT HEAD/BRAIN W/O DYE: CPT | Performed by: RADIOLOGY

## 2024-07-20 PROCEDURE — 83735 ASSAY OF MAGNESIUM: CPT | Performed by: NURSE PRACTITIONER

## 2024-07-20 PROCEDURE — 85027 COMPLETE CBC AUTOMATED: CPT | Performed by: INTERNAL MEDICINE

## 2024-07-20 PROCEDURE — 84484 ASSAY OF TROPONIN QUANT: CPT | Performed by: NURSE PRACTITIONER

## 2024-07-20 PROCEDURE — 76937 US GUIDE VASCULAR ACCESS: CPT

## 2024-07-20 PROCEDURE — 83735 ASSAY OF MAGNESIUM: CPT | Performed by: INTERNAL MEDICINE

## 2024-07-20 PROCEDURE — 71045 X-RAY EXAM CHEST 1 VIEW: CPT

## 2024-07-20 PROCEDURE — 2500000001 HC RX 250 WO HCPCS SELF ADMINISTERED DRUGS (ALT 637 FOR MEDICARE OP): Performed by: INTERNAL MEDICINE

## 2024-07-20 PROCEDURE — 82607 VITAMIN B-12: CPT | Mod: AHULAB | Performed by: INTERNAL MEDICINE

## 2024-07-20 PROCEDURE — 99221 1ST HOSP IP/OBS SF/LOW 40: CPT | Performed by: NEUROLOGICAL SURGERY

## 2024-07-20 PROCEDURE — 1200000002 HC GENERAL ROOM WITH TELEMETRY DAILY

## 2024-07-20 PROCEDURE — 80053 COMPREHEN METABOLIC PANEL: CPT | Mod: MUEWO | Performed by: INTERNAL MEDICINE

## 2024-07-20 PROCEDURE — 84443 ASSAY THYROID STIM HORMONE: CPT | Performed by: NURSE PRACTITIONER

## 2024-07-20 PROCEDURE — 2500000004 HC RX 250 GENERAL PHARMACY W/ HCPCS (ALT 636 FOR OP/ED): Performed by: NURSE PRACTITIONER

## 2024-07-20 PROCEDURE — 96375 TX/PRO/DX INJ NEW DRUG ADDON: CPT

## 2024-07-20 PROCEDURE — 82746 ASSAY OF FOLIC ACID SERUM: CPT | Mod: AHULAB | Performed by: INTERNAL MEDICINE

## 2024-07-20 PROCEDURE — 36415 COLL VENOUS BLD VENIPUNCTURE: CPT | Performed by: INTERNAL MEDICINE

## 2024-07-20 PROCEDURE — 83036 HEMOGLOBIN GLYCOSYLATED A1C: CPT | Mod: AHULAB | Performed by: INTERNAL MEDICINE

## 2024-07-20 PROCEDURE — 71045 X-RAY EXAM CHEST 1 VIEW: CPT | Mod: FOREIGN READ | Performed by: RADIOLOGY

## 2024-07-20 PROCEDURE — 70450 CT HEAD/BRAIN W/O DYE: CPT

## 2024-07-20 PROCEDURE — 99223 1ST HOSP IP/OBS HIGH 75: CPT | Performed by: INTERNAL MEDICINE

## 2024-07-20 PROCEDURE — 99291 CRITICAL CARE FIRST HOUR: CPT | Mod: 25

## 2024-07-20 PROCEDURE — G0378 HOSPITAL OBSERVATION PER HR: HCPCS

## 2024-07-20 PROCEDURE — 93005 ELECTROCARDIOGRAM TRACING: CPT

## 2024-07-20 PROCEDURE — C9113 INJ PANTOPRAZOLE SODIUM, VIA: HCPCS | Performed by: INTERNAL MEDICINE

## 2024-07-20 PROCEDURE — 72125 CT NECK SPINE W/O DYE: CPT

## 2024-07-20 PROCEDURE — 72125 CT NECK SPINE W/O DYE: CPT | Performed by: RADIOLOGY

## 2024-07-20 PROCEDURE — 85610 PROTHROMBIN TIME: CPT | Mod: 91 | Performed by: INTERNAL MEDICINE

## 2024-07-20 PROCEDURE — 2500000004 HC RX 250 GENERAL PHARMACY W/ HCPCS (ALT 636 FOR OP/ED): Performed by: INTERNAL MEDICINE

## 2024-07-20 PROCEDURE — 80053 COMPREHEN METABOLIC PANEL: CPT | Performed by: NURSE PRACTITIONER

## 2024-07-20 RX ORDER — HYDROMORPHONE HYDROCHLORIDE 0.2 MG/ML
0.2 INJECTION INTRAMUSCULAR; INTRAVENOUS; SUBCUTANEOUS EVERY 8 HOURS PRN
Status: DISCONTINUED | OUTPATIENT
Start: 2024-07-20 | End: 2024-07-20

## 2024-07-20 RX ORDER — DICLOFENAC SODIUM 10 MG/G
4 GEL TOPICAL 2 TIMES DAILY PRN
Status: DISCONTINUED | OUTPATIENT
Start: 2024-07-20 | End: 2024-07-20

## 2024-07-20 RX ORDER — LOSARTAN POTASSIUM 50 MG/1
50 TABLET ORAL DAILY
Status: DISCONTINUED | OUTPATIENT
Start: 2024-07-20 | End: 2024-07-21 | Stop reason: HOSPADM

## 2024-07-20 RX ORDER — LIDOCAINE 40 MG/G
CREAM TOPICAL 4 TIMES DAILY PRN
Status: DISCONTINUED | OUTPATIENT
Start: 2024-07-20 | End: 2024-07-21 | Stop reason: HOSPADM

## 2024-07-20 RX ORDER — METOPROLOL TARTRATE 25 MG/1
12.5 TABLET, FILM COATED ORAL 2 TIMES DAILY
Status: DISCONTINUED | OUTPATIENT
Start: 2024-07-20 | End: 2024-07-21

## 2024-07-20 RX ORDER — SCOLOPAMINE TRANSDERMAL SYSTEM 1 MG/1
1 PATCH, EXTENDED RELEASE TRANSDERMAL
Status: DISCONTINUED | OUTPATIENT
Start: 2024-07-20 | End: 2024-07-20

## 2024-07-20 RX ORDER — MAGNESIUM SULFATE HEPTAHYDRATE 40 MG/ML
2 INJECTION, SOLUTION INTRAVENOUS ONCE
Status: COMPLETED | OUTPATIENT
Start: 2024-07-20 | End: 2024-07-20

## 2024-07-20 RX ORDER — PRAVASTATIN SODIUM 40 MG/1
40 TABLET ORAL NIGHTLY
COMMUNITY

## 2024-07-20 RX ORDER — HEPARIN SODIUM 5000 [USP'U]/ML
5000 INJECTION, SOLUTION INTRAVENOUS; SUBCUTANEOUS EVERY 8 HOURS SCHEDULED
Status: DISCONTINUED | OUTPATIENT
Start: 2024-07-20 | End: 2024-07-21 | Stop reason: HOSPADM

## 2024-07-20 RX ORDER — ACETAMINOPHEN 650 MG/1
650 SUPPOSITORY RECTAL EVERY 4 HOURS PRN
Status: DISCONTINUED | OUTPATIENT
Start: 2024-07-20 | End: 2024-07-21 | Stop reason: HOSPADM

## 2024-07-20 RX ORDER — AZELASTINE 1 MG/ML
2 SPRAY, METERED NASAL 2 TIMES DAILY
Status: DISCONTINUED | OUTPATIENT
Start: 2024-07-20 | End: 2024-07-21

## 2024-07-20 RX ORDER — ACETAMINOPHEN 160 MG/5ML
650 SOLUTION ORAL EVERY 4 HOURS PRN
Status: DISCONTINUED | OUTPATIENT
Start: 2024-07-20 | End: 2024-07-21 | Stop reason: HOSPADM

## 2024-07-20 RX ORDER — ONDANSETRON HYDROCHLORIDE 2 MG/ML
4 INJECTION, SOLUTION INTRAVENOUS EVERY 8 HOURS PRN
Status: DISCONTINUED | OUTPATIENT
Start: 2024-07-20 | End: 2024-07-21 | Stop reason: HOSPADM

## 2024-07-20 RX ORDER — TALC
3 POWDER (GRAM) TOPICAL NIGHTLY PRN
Status: DISCONTINUED | OUTPATIENT
Start: 2024-07-20 | End: 2024-07-21 | Stop reason: HOSPADM

## 2024-07-20 RX ORDER — SODIUM CHLORIDE, SODIUM LACTATE, POTASSIUM CHLORIDE, CALCIUM CHLORIDE 600; 310; 30; 20 MG/100ML; MG/100ML; MG/100ML; MG/100ML
75 INJECTION, SOLUTION INTRAVENOUS CONTINUOUS
Status: ACTIVE | OUTPATIENT
Start: 2024-07-20 | End: 2024-07-20

## 2024-07-20 RX ORDER — ACETAMINOPHEN 325 MG/1
650 TABLET ORAL EVERY 4 HOURS PRN
Status: DISCONTINUED | OUTPATIENT
Start: 2024-07-20 | End: 2024-07-21 | Stop reason: HOSPADM

## 2024-07-20 RX ORDER — IBUPROFEN 200 MG
200 CAPSULE ORAL DAILY
Status: DISCONTINUED | OUTPATIENT
Start: 2024-07-20 | End: 2024-07-21 | Stop reason: HOSPADM

## 2024-07-20 RX ORDER — ASPIRIN 81 MG/1
81 TABLET ORAL DAILY
Status: DISCONTINUED | OUTPATIENT
Start: 2024-07-20 | End: 2024-07-21 | Stop reason: HOSPADM

## 2024-07-20 RX ORDER — ONDANSETRON 4 MG/1
4 TABLET, FILM COATED ORAL EVERY 8 HOURS PRN
Status: DISCONTINUED | OUTPATIENT
Start: 2024-07-20 | End: 2024-07-21 | Stop reason: HOSPADM

## 2024-07-20 RX ORDER — PANTOPRAZOLE SODIUM 40 MG/10ML
40 INJECTION, POWDER, LYOPHILIZED, FOR SOLUTION INTRAVENOUS 2 TIMES DAILY
Status: DISCONTINUED | OUTPATIENT
Start: 2024-07-20 | End: 2024-07-21 | Stop reason: HOSPADM

## 2024-07-20 RX ORDER — AMOXICILLIN 250 MG
1 CAPSULE ORAL
Status: DISCONTINUED | OUTPATIENT
Start: 2024-07-20 | End: 2024-07-21

## 2024-07-20 RX ORDER — POLYETHYLENE GLYCOL 3350 17 G/17G
17 POWDER, FOR SOLUTION ORAL DAILY
Status: DISCONTINUED | OUTPATIENT
Start: 2024-07-20 | End: 2024-07-21 | Stop reason: HOSPADM

## 2024-07-20 RX ADMIN — SODIUM CHLORIDE 1000 ML: 9 INJECTION, SOLUTION INTRAVENOUS at 09:35

## 2024-07-20 RX ADMIN — SODIUM CHLORIDE, POTASSIUM CHLORIDE, SODIUM LACTATE AND CALCIUM CHLORIDE 75 ML/HR: 600; 310; 30; 20 INJECTION, SOLUTION INTRAVENOUS at 13:51

## 2024-07-20 RX ADMIN — MAGNESIUM SULFATE HEPTAHYDRATE 2 G: 40 INJECTION, SOLUTION INTRAVENOUS at 12:07

## 2024-07-20 RX ADMIN — Medication 3 MG: at 21:05

## 2024-07-20 RX ADMIN — METOPROLOL TARTRATE 12.5 MG: 25 TABLET, FILM COATED ORAL at 21:05

## 2024-07-20 RX ADMIN — PANTOPRAZOLE SODIUM 40 MG: 40 INJECTION, POWDER, FOR SOLUTION INTRAVENOUS at 21:06

## 2024-07-20 RX ADMIN — ACETAMINOPHEN 650 MG: 325 TABLET ORAL at 21:05

## 2024-07-20 RX ADMIN — ASPIRIN 81 MG: 81 TABLET, COATED ORAL at 16:55

## 2024-07-20 SDOH — SOCIAL STABILITY: SOCIAL INSECURITY: ARE YOU OR HAVE YOU BEEN THREATENED OR ABUSED PHYSICALLY, EMOTIONALLY, OR SEXUALLY BY ANYONE?: NO

## 2024-07-20 SDOH — SOCIAL STABILITY: SOCIAL INSECURITY: HAVE YOU HAD THOUGHTS OF HARMING ANYONE ELSE?: NO

## 2024-07-20 SDOH — SOCIAL STABILITY: SOCIAL INSECURITY: DO YOU FEEL ANYONE HAS EXPLOITED OR TAKEN ADVANTAGE OF YOU FINANCIALLY OR OF YOUR PERSONAL PROPERTY?: NO

## 2024-07-20 SDOH — ECONOMIC STABILITY: HOUSING INSECURITY: AT ANY TIME IN THE PAST 12 MONTHS, WERE YOU HOMELESS OR LIVING IN A SHELTER (INCLUDING NOW)?: NO

## 2024-07-20 SDOH — SOCIAL STABILITY: SOCIAL INSECURITY: DOES ANYONE TRY TO KEEP YOU FROM HAVING/CONTACTING OTHER FRIENDS OR DOING THINGS OUTSIDE YOUR HOME?: NO

## 2024-07-20 SDOH — ECONOMIC STABILITY: INCOME INSECURITY: IN THE LAST 12 MONTHS, WAS THERE A TIME WHEN YOU WERE NOT ABLE TO PAY THE MORTGAGE OR RENT ON TIME?: NO

## 2024-07-20 SDOH — ECONOMIC STABILITY: INCOME INSECURITY: HOW HARD IS IT FOR YOU TO PAY FOR THE VERY BASICS LIKE FOOD, HOUSING, MEDICAL CARE, AND HEATING?: NOT VERY HARD

## 2024-07-20 SDOH — SOCIAL STABILITY: SOCIAL INSECURITY: DO YOU FEEL UNSAFE GOING BACK TO THE PLACE WHERE YOU ARE LIVING?: NO

## 2024-07-20 SDOH — SOCIAL STABILITY: SOCIAL INSECURITY: HAVE YOU HAD ANY THOUGHTS OF HARMING ANYONE ELSE?: NO

## 2024-07-20 SDOH — SOCIAL STABILITY: SOCIAL INSECURITY: ARE THERE ANY APPARENT SIGNS OF INJURIES/BEHAVIORS THAT COULD BE RELATED TO ABUSE/NEGLECT?: NO

## 2024-07-20 SDOH — SOCIAL STABILITY: SOCIAL INSECURITY: HAS ANYONE EVER THREATENED TO HURT YOUR FAMILY OR YOUR PETS?: NO

## 2024-07-20 SDOH — ECONOMIC STABILITY: HOUSING INSECURITY: IN THE PAST 12 MONTHS, HOW MANY TIMES HAVE YOU MOVED WHERE YOU WERE LIVING?: 1

## 2024-07-20 SDOH — HEALTH STABILITY: MENTAL HEALTH: HOW MANY STANDARD DRINKS CONTAINING ALCOHOL DO YOU HAVE ON A TYPICAL DAY?: PATIENT DOES NOT DRINK

## 2024-07-20 SDOH — ECONOMIC STABILITY: TRANSPORTATION INSECURITY
IN THE PAST 12 MONTHS, HAS LACK OF TRANSPORTATION KEPT YOU FROM MEETINGS, WORK, OR FROM GETTING THINGS NEEDED FOR DAILY LIVING?: NO

## 2024-07-20 SDOH — HEALTH STABILITY: MENTAL HEALTH: HOW OFTEN DO YOU HAVE A DRINK CONTAINING ALCOHOL?: NEVER

## 2024-07-20 SDOH — HEALTH STABILITY: MENTAL HEALTH: HOW OFTEN DO YOU HAVE 6 OR MORE DRINKS ON ONE OCCASION?: NEVER

## 2024-07-20 SDOH — SOCIAL STABILITY: SOCIAL INSECURITY: ABUSE: ADULT

## 2024-07-20 SDOH — SOCIAL STABILITY: SOCIAL INSECURITY: WERE YOU ABLE TO COMPLETE ALL THE BEHAVIORAL HEALTH SCREENINGS?: YES

## 2024-07-20 SDOH — ECONOMIC STABILITY: TRANSPORTATION INSECURITY
IN THE PAST 12 MONTHS, HAS THE LACK OF TRANSPORTATION KEPT YOU FROM MEDICAL APPOINTMENTS OR FROM GETTING MEDICATIONS?: NO

## 2024-07-20 ASSESSMENT — ACTIVITIES OF DAILY LIVING (ADL)
DRESSING YOURSELF: DEPENDENT
PATIENT'S MEMORY ADEQUATE TO SAFELY COMPLETE DAILY ACTIVITIES?: NO
ADEQUATE_TO_COMPLETE_ADL: YES
ASSISTIVE_DEVICE: CANE;DENTURES UPPER;EYEGLASSES
GROOMING: NEEDS ASSISTANCE
TOILETING: NEEDS ASSISTANCE
BATHING: DEPENDENT
HEARING - RIGHT EAR: DIFFICULTY WITH NOISE
FEEDING YOURSELF: NEEDS ASSISTANCE
JUDGMENT_ADEQUATE_SAFELY_COMPLETE_DAILY_ACTIVITIES: NO
HEARING - LEFT EAR: DIFFICULTY WITH NOISE
WALKS IN HOME: NEEDS ASSISTANCE

## 2024-07-20 ASSESSMENT — COGNITIVE AND FUNCTIONAL STATUS - GENERAL
CLIMB 3 TO 5 STEPS WITH RAILING: A LOT
MOVING TO AND FROM BED TO CHAIR: A LITTLE
DAILY ACTIVITIY SCORE: 16
STANDING UP FROM CHAIR USING ARMS: A LITTLE
PERSONAL GROOMING: A LITTLE
WALKING IN HOSPITAL ROOM: A LOT
TOILETING: A LOT
TURNING FROM BACK TO SIDE WHILE IN FLAT BAD: A LITTLE
DRESSING REGULAR LOWER BODY CLOTHING: A LOT
EATING MEALS: A LITTLE
MOVING FROM LYING ON BACK TO SITTING ON SIDE OF FLAT BED WITH BEDRAILS: A LITTLE
PATIENT BASELINE BEDBOUND: NO
DRESSING REGULAR UPPER BODY CLOTHING: A LITTLE
MOBILITY SCORE: 16
HELP NEEDED FOR BATHING: A LITTLE

## 2024-07-20 ASSESSMENT — PAIN SCALES - GENERAL
PAINLEVEL_OUTOF10: 10 - WORST POSSIBLE PAIN
PAINLEVEL_OUTOF10: 0 - NO PAIN
PAINLEVEL_OUTOF10: 0 - NO PAIN

## 2024-07-20 ASSESSMENT — COLUMBIA-SUICIDE SEVERITY RATING SCALE - C-SSRS
6. HAVE YOU EVER DONE ANYTHING, STARTED TO DO ANYTHING, OR PREPARED TO DO ANYTHING TO END YOUR LIFE?: NO
1. IN THE PAST MONTH, HAVE YOU WISHED YOU WERE DEAD OR WISHED YOU COULD GO TO SLEEP AND NOT WAKE UP?: NO
2. HAVE YOU ACTUALLY HAD ANY THOUGHTS OF KILLING YOURSELF?: NO

## 2024-07-20 ASSESSMENT — PAIN DESCRIPTION - LOCATION: LOCATION: LEG

## 2024-07-20 ASSESSMENT — PAIN - FUNCTIONAL ASSESSMENT
PAIN_FUNCTIONAL_ASSESSMENT: 0-10
PAIN_FUNCTIONAL_ASSESSMENT: 0-10

## 2024-07-20 ASSESSMENT — LIFESTYLE VARIABLES
AUDIT-C TOTAL SCORE: 0
SKIP TO QUESTIONS 9-10: 1

## 2024-07-20 ASSESSMENT — PAIN DESCRIPTION - ORIENTATION: ORIENTATION: RIGHT;LEFT

## 2024-07-20 ASSESSMENT — PAIN DESCRIPTION - PAIN TYPE: TYPE: ACUTE PAIN

## 2024-07-20 NOTE — H&P
History Of Present Illness  Kerry Arevalo is a 84 y.o. female presenting with mechanical fall.     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), Other specified disorders of left ear (12/27/2016), Personal history of diseases of the blood and blood-forming organs and certain disorders involving the immune mechanism (02/19/2013), Personal history of diseases of the skin and subcutaneous tissue (05/12/2013), Personal history of other diseases of the musculoskeletal system and connective tissue, Personal history of other diseases of the nervous system and sense organs, Personal history of other diseases of the respiratory system (03/04/2019), Personal history of other infectious and parasitic diseases (03/09/2017), Personal history of other specified conditions (04/11/2019), and Unspecified asthma, uncomplicated ([facility]) (12/19/2022).    Surgical History  She has a past surgical history that includes Colonoscopy (02/19/2013); Total abdominal hysterectomy w/ bilateral salpingoophorectomy (02/19/2013); Other surgical history (05/12/2013); Hernia repair (09/26/2016); and Cataract extraction (10/14/2014).     Social History  She reports that she has never smoked. She has never used smokeless tobacco. She reports that she does not currently use alcohol. She reports that she does not use drugs.    Family History  Family History   Problem Relation Name Age of  Onset    Heart failure Mother      Osteoporosis Mother      Other (cerebral hemorrhage) Father      Alzheimer's disease Sister      Heart attack Brother      Breast cancer Other       Results for orders placed or performed during the hospital encounter of 07/20/24 (from the past 24 hour(s))   CBC and Auto Differential   Result Value Ref Range    WBC 8.1 4.4 - 11.3 x10*3/uL    nRBC 0.0 0.0 - 0.0 /100 WBCs    RBC 2.59 (L) 4.00 - 5.20 x10*6/uL    Hemoglobin 7.8 (L) 12.0 - 16.0 g/dL    Hematocrit 24.2 (L) 36.0 - 46.0 %    MCV 93 80 - 100 fL    MCH 30.1 26.0 - 34.0 pg    MCHC 32.2 32.0 - 36.0 g/dL    RDW 20.9 (H) 11.5 - 14.5 %    Platelets 239 150 - 450 x10*3/uL    Neutrophils % 71.7 40.0 - 80.0 %    Immature Granulocytes %, Automated 0.4 0.0 - 0.9 %    Lymphocytes % 13.4 13.0 - 44.0 %    Monocytes % 14.4 2.0 - 10.0 %    Eosinophils % 0.0 0.0 - 6.0 %    Basophils % 0.1 0.0 - 2.0 %    Neutrophils Absolute 5.78 (H) 1.60 - 5.50 x10*3/uL    Immature Granulocytes Absolute, Automated 0.03 0.00 - 0.50 x10*3/uL    Lymphocytes Absolute 1.08 0.80 - 3.00 x10*3/uL    Monocytes Absolute 1.16 (H) 0.05 - 0.80 x10*3/uL    Eosinophils Absolute 0.00 0.00 - 0.40 x10*3/uL    Basophils Absolute 0.01 0.00 - 0.10 x10*3/uL   Comprehensive metabolic panel   Result Value Ref Range    Glucose 103 (H) 74 - 99 mg/dL    Sodium 144 136 - 145 mmol/L    Potassium 3.6 3.5 - 5.3 mmol/L    Chloride 110 (H) 98 - 107 mmol/L    Bicarbonate 23 21 - 32 mmol/L    Anion Gap 15 10 - 20 mmol/L    Urea Nitrogen 13 6 - 23 mg/dL    Creatinine 0.45 (L) 0.50 - 1.05 mg/dL    eGFR >90 >60 mL/min/1.73m*2    Calcium 11.2 (H) 8.6 - 10.3 mg/dL    Albumin 3.3 (L) 3.4 - 5.0 g/dL    Alkaline Phosphatase 84 33 - 136 U/L    Total Protein 6.1 (L) 6.4 - 8.2 g/dL    AST 36 9 - 39 U/L    Bilirubin, Total 0.9 0.0 - 1.2 mg/dL    ALT 23 7 - 45 U/L   Magnesium   Result Value Ref Range    Magnesium 1.40 (L) 1.60 - 2.40 mg/dL   Protime-INR   Result Value Ref Range    Protime 13.2 (H) 9.8 -  12.8 seconds    INR 1.2 (H) 0.9 - 1.1   Troponin I, High Sensitivity   Result Value Ref Range    Troponin I, High Sensitivity 7 0 - 13 ng/L   TSH with reflex to Free T4 if abnormal   Result Value Ref Range    Thyroid Stimulating Hormone 1.61 0.44 - 3.98 mIU/L   Morphology   Result Value Ref Range    RBC Morphology No significant RBC morphology present     Hypochromia Marked     Ovalocytes Few         Allergies  Sulfasalazine, Duloxetine, and Topiramate    Review of Systems     Physical Exam     Last Recorded Vitals  /55   Pulse 95   Temp 36.7 °C (98.1 °F)   Resp 14   Wt 52.2 kg (115 lb)   SpO2 98%     Relevant Results  Scheduled medications  magnesium sulfate, 2 g, intravenous, Once      Continuous medications  lactated Ringer's, 75 mL/hr      PRN medications  Kerry Arevalo is a 84 y.o. female on day 0 of admission presenting with Epidural hematoma (Multi).      Dietary Orders (From admission, onward)               Oral nutritional supplements  Until discontinued        Question Answer Comment   Deliver with All meals    Select supplement: Glucerna Shake                          Objective     Vitals  Temperature:  [36.7 °C (98.1 °F)] 36.7 °C (98.1 °F)  Heart Rate:  [] 95  Respirations:  [12-18] 14  BP: (112-156)/(54-95) 138/55       0-10 (Numeric) Pain Score: 0 - No pain         Peripheral IV 07/20/24 20 G Left Antecubital (Active)   Number of days: 0       Peripheral IV 07/20/24 20 G Left Antecubital (Active)   Number of days: 0       Vent Settings     No intake or output data in the 24 hours ending 07/20/24 1235    Relevant Results  Scheduled medications  aspirin, 81 mg, oral, Daily  azelastine, 2 spray, Each Nostril, BID  calcium citrate, 200 mg, oral, Daily  heparin (porcine), 5,000 Units, subcutaneous, q8h ELYSE  losartan, 50 mg, oral, Daily  magnesium sulfate, 2 g, intravenous, Once  metoprolol tartrate, 12.5 mg, oral, BID  polyethylene glycol, 17 g, oral, Daily  sennosides-docusate sodium, 1  tablet, oral, Daily      Continuous medications  lactated Ringer's, 75 mL/hr, Last Rate: 75 mL/hr (07/20/24 1351)      PRN medications  PRN medications: acetaminophen **OR** acetaminophen **OR** acetaminophen, benzocaine-menthol, diclofenac sodium, lidocaine, melatonin, ondansetron **OR** ondansetron             Assessment/Plan                    S/  See and examined  No new complaints no new event over the night   ROS: Negative    O/   General Appearance: Alert, Oriented X3, Cooperative, Not in Acute Distress  HEENT: no eye redness, not pale, no jaundice, Throat: not congested, few oral ulcers    Chest: Good AE bilateral, No crackles, no ronchies, no wheezes.   Heart: RHR, Normal heart sounds S1, S2, no murmur or gallop,   Abdomen: Soft, lax, no hepatosplenomegaly, intact hernia orifices, negative khan sign, no tenderness,   Genitalia: no abnormal discharge, no ulcers, no swelling.  Lymphatics: no lymphadenopathy, supraclavicular, inguinal trochlear, or axilla.   Neurology: Intact cranial nerves 2 to 12, intact sensation, intact motor function (Power, tone and reflexes). Normal DTR reflexes.   Extremities: no signs of PAD, no swelling no ulcers   Back: no deformity, no SI tenderness, no ulcers.   Skin: no signs of dehydration, no Rash, Bruises, or purpura.      AS:    Ms. Kerry Arevalo is a 84 y.o. female with PMHx of HTN, T2DM, OA, RA, and spinal stenosis c/b lumbar radiculopathy, recently dx with DRESS syndrome completed course of steroid, who presented to the ED for what suspected to be a mechanical fall, head Rhea reported that the patient was standing on the front of the fridge when she fell imbalance unsteady she tried to help her but then the patient fall and hit back minimal head trauma with no injury, patient feels weak denied any loss of consciousness, denied any headache nausea vomiting chest pain dyspnea.  Seen and evaluated at the ED CT head scan showed no new events.  Neurology and neurosurgery  the plan to repeat CT scan after 6 hours.    #.  Mechanical fall.  -Vitals stable  -Mild signs of dehydration and malnutrition  -Brain CT, no signs of new event or bleed  -Neurology and neurosurgery on board the plan to repeat the CT scan after 6 hours.  -Continue with IV fluid maintenance hydration and encourage oral intake.  #.  History of dress syndrome  -Was diagnosed 2 months ago.  -Completed course of steroid  -Rash and itching improved with some minimal oral ulcers but she able to tolerate oral diet.  -She had an outpatient follow-up with dermatology Dr. Combs.    # Anemia  - macrocytic v MDS  - Hgb 9.5 > 7.8  - No evidence of bleeding   - pending Iron and b12, folic level   - will need hematology f/p as outpatient   # HTN  - resume losartan  - orthostatic   - continue monitoring       # chronic Sinusitis  -C/w Azelastine spray   -C/w Montelukast 10 mg   #. Sinus tachy with PACs, PVS  - seen on previous admissions  - consider small dos eof BB  - to be f/p with outpatient PCP and cardiology   Metoprolol 12.5 bid as tolerated  # hx of Steroid induced hyperglycemia  - HbA1c 5.8 on admission  - c/w monitoring   - no insulin   Code Status: Full  DVT ppx:  heparin sc  Disp: PT/ot     Spoke to my patient, Discussed the medical, social conditions, the reason for this admission explained our plan and recommendations.  Time spent on the assessment of patient, gathering and interpreting data, review of medical record/patient history, personally reviewing radiographic imaging. With greater than 50% spent in personal discussion with patient.  Time > 55min      Raymundo Dodson MD

## 2024-07-20 NOTE — CARE PLAN
The patient's goals for the shift include      The clinical goals for the shift include Patient will remain safe throughout the shift    Over the shift, the patient did make progress toward the following goals.

## 2024-07-20 NOTE — ED TRIAGE NOTES
Pt to ED from home for increased weakness, fatigue, loss of appetite and weight loss. Pt's daughter states that pt was admitted about a month ago for an allergic reaction to sulfa meds and has been confused and weak since that admission.

## 2024-07-20 NOTE — ED PROVIDER NOTES
HPI   Chief Complaint   Patient presents with    Weakness, Gen    Weight Loss    Altered Mental Status       84-year-old female presents today with weakness and she was discharged from the hospital 1 month ago.  She is unable to ambulate on her own.  She fell backwards yesterday from her weakness, striking the back of her head.  She does not remember if she lost consciousness.  Daughter who is bedside and lives 8 minutes away indicates that patient has no history of seizures.  She does appreciate a hematoma to the posterior scalp.  Daughter notes that she was admitted to the hospital a month ago when she had an allergic reaction to sulfa drugs and she was diagnosed with dress.  She indicates that the patient is not eating and she had a rash in her mouth went to dentistry and they gave her a compounded swish which seems to be helping.  Daughter notes that the eyes have become crusty and patient has a history of cataracts but they have not removed the cataracts and patient's vision is limited.  Patient indicates she feels very weak and disorganized.  She lives with her other daughter.  She has good family support.  When she arrived in the emergency department she was tachycardic.  She appears severely dehydrated.      History provided by:  Patient and EMS personnel   used: No            Patient History   Past Medical History:   Diagnosis Date    Body mass index (BMI) 25.0-25.9, adult 12/18/2019    Body mass index (BMI) of 25.0 to 25.9 in adult    Body mass index (BMI) 27.0-27.9, adult 04/26/2022    Body mass index (BMI) of 27.0 to 27.9 in adult    Body mass index (BMI) 28.0-28.9, adult 06/14/2022    Body mass index (BMI) of 28.0 to 28.9 in adult    Body mass index (BMI) 29.0-29.9, adult 08/16/2021    Body mass index (BMI) of 29.0 to 29.9 in adult    Body mass index (BMI)30.0-30.9, adult 01/27/2021    Body mass index (BMI) of 30.0 to 30.9 in adult    Disappearance and death of family member  10/31/2013    Bereavement, uncomplicated    Encounter for screening mammogram for malignant neoplasm of breast 06/27/2016    Encounter for screening mammogram for breast cancer    Macular cyst, hole, or pseudohole, right eye     Macular hole, right    Menopausal and female climacteric states 11/04/2015    Menopausal symptoms    Other postherpetic nervous system involvement 07/24/2017    Postherpetic neuralgia    Other specified disorders of left ear 12/27/2016    Fullness in ear, left    Personal history of diseases of the blood and blood-forming organs and certain disorders involving the immune mechanism 02/19/2013    History of anemia    Personal history of diseases of the skin and subcutaneous tissue 05/12/2013    History of hair or hair follicle disorder    Personal history of other diseases of the musculoskeletal system and connective tissue     History of arthritis    Personal history of other diseases of the nervous system and sense organs     History of cataract    Personal history of other diseases of the respiratory system 03/04/2019    History of acute bronchitis    Personal history of other infectious and parasitic diseases 03/09/2017    History of herpes zoster    Personal history of other specified conditions 04/11/2019    History of weight loss    Unspecified asthma, uncomplicated ([facility]) 12/19/2022    Asthma     Past Surgical History:   Procedure Laterality Date    CATARACT EXTRACTION  10/14/2014    Cataract Surgery    COLONOSCOPY  02/19/2013    Complete Colonoscopy    HERNIA REPAIR  09/26/2016    Hernia Repair    OTHER SURGICAL HISTORY  05/12/2013    Repair Of Retinal Detachment With Macular Detachment    TOTAL ABDOMINAL HYSTERECTOMY W/ BILATERAL SALPINGOOPHORECTOMY  02/19/2013    Total Abdominal Hysterectomy With Removal Of Both Ovaries     Family History   Problem Relation Name Age of Onset    Heart failure Mother      Osteoporosis Mother      Other (cerebral hemorrhage) Father      Alzheimer's  disease Sister      Heart attack Brother      Breast cancer Other       Social History     Tobacco Use    Smoking status: Never    Smokeless tobacco: Never   Substance Use Topics    Alcohol use: Not Currently    Drug use: Never       Physical Exam   ED Triage Vitals   Temperature Heart Rate Respirations BP   07/20/24 0912 07/20/24 0906 07/20/24 0906 07/20/24 0906   36.7 °C (98.1 °F) (!) 111 17 (!) 133/95      Pulse Ox Temp src Heart Rate Source Patient Position   07/20/24 0906 -- -- --   100 %         BP Location FiO2 (%)     -- --             Physical Exam  Constitutional:       Appearance: She is ill-appearing.   HENT:      Head: Normocephalic and atraumatic.      Mouth/Throat:      Mouth: Mucous membranes are moist.      Pharynx: Oropharynx is clear.   Eyes:      General: No visual field deficit.     Extraocular Movements: Extraocular movements intact.      Pupils: Pupils are equal, round, and reactive to light.   Cardiovascular:      Rate and Rhythm: Regular rhythm. Tachycardia present.      Heart sounds: Normal heart sounds.   Pulmonary:      Effort: Pulmonary effort is normal.      Breath sounds: Normal breath sounds.   Abdominal:      General: Bowel sounds are normal.      Palpations: Abdomen is soft.   Musculoskeletal:         General: Normal range of motion.      Cervical back: Normal range of motion and neck supple.   Skin:     General: Skin is warm.      Capillary Refill: Capillary refill takes less than 2 seconds.   Neurological:      Mental Status: She is alert.      GCS: GCS eye subscore is 4. GCS verbal subscore is 5. GCS motor subscore is 6.      Cranial Nerves: No cranial nerve deficit, dysarthria or facial asymmetry.      Sensory: No sensory deficit.      Motor: No weakness.      Coordination: Romberg sign negative. Coordination normal.      Gait: Gait normal.      Deep Tendon Reflexes: Reflexes normal. Babinski sign absent on the right side. Babinski sign absent on the left side.   Psychiatric:          Mood and Affect: Mood normal.           ED Course & MDM   Diagnoses as of 07/20/24 1210   Epidural hematoma (Multi)   Subdural bleeding (Multi)   Hypomagnesemia   Injury of head, initial encounter                       Dupo Coma Scale Score: 15         NIH Stroke Scale: 0                Medical Decision Making  Patient received a liter of fluid.  I was concerned that she was dehydrated.  Daughter and indicates she had no history of congestive heart failure.  Because she fell and struck the back of her head I ordered a CT head and cervical spine.  Chest x-ray was ordered.  TSH with free T4 was ordered for her weakness and urinalysis was ordered.  CBC CMP and troponin were ordered.  She was staffed with attending.    Vital signs rechecked multiple times and stable.  Patient remains alert and oriented and a second NIH was completed at 12 PM that remains 0.  Patient was repleted originally with 1 L normal saline with concern for dehydration.  Her INR was 1.2.  Her TSH was normal.  Her CBC had a hemoglobin hematocrit of 7.8 and 24.2 but this is actually stable compared to her labs from 2 days ago.  Her troponin was 7.  Her metabolic panel had a chloride of 110 and a creatinine of 0.45 with a glucose of 103.  Her calcium was 11.2 and albumin was 3.3 with a total protein of 6.1.  Her magnesium was 1.40 and we repleted with 2 g magnesium IV.  Her chest x-ray showed no radiographic evidence of an acute cardiopulmonary process.  The CT of the head confirmed an acute right temporal lentiform shaped extra-axial hemorrhage either acute epidural hematoma or less likely acute subdural hematoma.  This could also represent a combination of an epidural bleed and a subdural bleed.  We spoke with transfer center and they confirmed that Dr. Valdez at neurosurgery main [place] felt patient would be safe to keep at [facility].  I then paged neurosurgery and spoke both with Dr. Claudio, and Dr Larry. Dr Claudio recommended a  follow-up CT 6 hours from the first CT and I ordered a follow-up CT to be completed at 4 PM.  I wrote to hospital service and they accepted patient with an understanding that we will have a follow-up CT before patient goes to the floor.  Patient was seen and staffed with attending and she continued to be monitored by nursing staff.    Amount and/or Complexity of Data Reviewed  Labs: ordered.  Radiology: ordered and independent interpretation performed.  ECG/medicine tests: ordered and independent interpretation performed.     Details: 2 EKGs completed the first EKG is 103 bpm sinus tach without any ST elevation or depression.  Normal axis.  CT intervals normal QT corrected is normal.  Interpreted by attending and myself    Second EKG is 104 bpm sinus tachycardia without any ST elevation or depression.  CT interval is normal QT corrected is normal.  Interpreted both by attending and myself.        Procedure  Critical Care    Performed by: MARILOU Valadez-CNP  Authorized by: Maximo Mak MD    Critical care provider statement:     Critical care time (minutes):  45    Critical care time was exclusive of:  Separately billable procedures and treating other patients    Critical care was necessary to treat or prevent imminent or life-threatening deterioration of the following conditions:  CNS failure or compromise    Critical care was time spent personally by me on the following activities:  Discussions with consultants, evaluation of patient's response to treatment, examination of patient, obtaining history from patient or surrogate, ordering and performing treatments and interventions, ordering and review of laboratory studies, ordering and review of radiographic studies, pulse oximetry and re-evaluation of patient's condition    Care discussed with: admitting provider and accepting provider at another facility    Comments:      Consult with 3 neurosurgeons and hospital service excepted patient after full  consult with specialties       Jassi Perez, MARILOU-CNP  07/20/24 7918

## 2024-07-20 NOTE — PROGRESS NOTES
Pharmacy Medication History Review~~~~FAMILY AT BEDSIDE VERIFIED ALL MEDICATIONS AND DOSES.~~~~    Kerry Arevalo is a 84 y.o. female admitted for Epidural hematoma (Multi). Pharmacy reviewed the patient's [id number]-[id number] medications and allergies for accuracy.    The list below reflectives the updated PTA list. Please review each medication in order reconciliation for additional clarification and justification.  Prior to Admission Medications   Prescriptions Last Dose Informant     aspirin 81 mg EC tablet 7/19/2024      Sig: Take 1 tablet (81 mg) by mouth once daily.   azelastine (Astelin) 137 mcg (0.1 %) nasal spray Past Week      Sig: Administer 2 sprays into each nostril 2 times a day.   calcium citrate (Calcitrate) 200 mg (950 mg) tablet 7/19/2024      Sig: Take 1 tablet (200 mg) by mouth once daily.                lidocaine (LMX 4) 4 % cream Past Month Self     Sig: Apply topically 4 times a day as needed for mild pain (1 - 3). On the feet   losartan (Cozaar) 50 mg tablet 7/19/2024      Sig: Take 1 tablet (50 mg) by mouth once daily.   Patient taking differently: Take 1 tablet (50 mg) by mouth once daily. Hold oif SBP less 100 mmhg   montelukast (Singulair) 10 mg tablet 7/18/2024      Sig: Take 1 tablet (10 mg) by mouth once daily.   multivit-minerals/folic acid (ONE-A-DAY WOMEN'S 50 PLUS ORAL) 7/19/2024      Sig: Take 1 tablet by mouth once daily.   pravastatin (Pravachol) 40 mg tablet 7/19/2024      Sig: Take 1 tablet (40 mg) by mouth once daily at bedtime.   sennosides-docusate sodium (Brittny-Colace) 8.6-50 mg tablet 7/19/2024      Sig: Take 1 tablet by mouth once daily.   triamcinolone (Kenalog) 0.1 % ointment Past Week      Sig: Apply topically once daily. Small anything if needed for itch      Facility-Administered Medications: None      Allergies  Reviewed by Sonia Quevedo RN on 7/20/2024        Severity Reactions Comments    Sulfasalazine High Dermatitis, Other DRESS syndrome    Duloxetine Not  Specified Other Weight loss    Topiramate Not Specified Other Appetite and  weight loss            Below are additional concerns with the patient's PTA list.  Family at bedside verified all medications and doses.    Angeles Pace

## 2024-07-20 NOTE — CONSULTS
Reason For Consult  EDH    History Of Present Illness  Kerry Arevalo is a 84 y.o. female presenting with EDH.    84 F h/o HTN, T2DM, OA, RA, lumbar radiculopathy. DRESS s/p mechanical fall, CTH small R temporal EDH, rCTH stable, rrCTH stable    Per patient she was standing at her fridge and felt unsteady, she fell back and hit her head. No LOC. No HA, n/v, visual changes, numbness, weakness, paresthesias, fevers, chills. Family at bedside and said she is at her baseline    No AC. Takes daily ASA for heart health.     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), Other specified disorders of left ear (12/27/2016), Personal history of diseases of the blood and blood-forming organs and certain disorders involving the immune mechanism (02/19/2013), Personal history of diseases of the skin and subcutaneous tissue (05/12/2013), Personal history of other diseases of the musculoskeletal system and connective tissue, Personal history of other diseases of the nervous system and sense organs, Personal history of other diseases of the respiratory system (03/04/2019), Personal history of other infectious and parasitic diseases (03/09/2017), Personal history of other specified conditions (04/11/2019), and Unspecified asthma, uncomplicated ([facility]) (12/19/2022).    Surgical History  She has a past surgical history that includes Colonoscopy (02/19/2013); Total abdominal  "hysterectomy w/ bilateral salpingoophorectomy (02/19/2013); Other surgical history (05/12/2013); Hernia repair (09/26/2016); and Cataract extraction (10/14/2014).     Social History  She reports that she has never smoked. She has never used smokeless tobacco. She reports that she does not currently use alcohol. She reports that she does not use drugs.    Family History  Family History   Problem Relation Name Age of Onset    Heart failure Mother      Osteoporosis Mother      Other (cerebral hemorrhage) Father      Alzheimer's disease Sister      Heart attack Brother      Breast cancer Other          Allergies  Sulfasalazine, Duloxetine, and Topiramate    Review of Systems  Negative other than above     Physical Exam  NAD  Well perfused  Appropriate affect  NELLIE  No skin lesions  Equal chest rise b/l    Aox2-3   PERRL, EOMI, FS  BUE prox 5 dist 5  BLE prox 5 dist 5  SILT     Last Recorded Vitals  Blood pressure 145/63, pulse 62, temperature 36.7 °C (98.1 °F), resp. rate 18, height 1.397 m (4' 7\"), weight 52.2 kg (115 lb), SpO2 97%.    Relevant Results  Imaging reviewed and as above     Assessment/Plan     84 F h/o HTN, T2DM, OA, RA, lumbar radiculopathy. DRESS s/p mechanical fall, CTH small R temporal EDH, rCTH stable, rrCTH stable    Patient overall neurologically stable in the setting of a small traumatic bleed.     -no acute neurosurgical intervention  -no further imaging needed from a neurosurgical perspective  -ok for DVT chemoppx post bleed day 2  -will arrange for 2 week follow up with repeat CT head w/ Dr. Larry's staff  -hold ASA until follow up with Dr. Larry  -neurosurgery will sign off at this time, please page with any questions or concerns    Imaging and plan staffed with Dr. Larry who agrees with management    Shane Claudio MD    "

## 2024-07-21 ENCOUNTER — APPOINTMENT (OUTPATIENT)
Dept: RADIOLOGY | Facility: HOSPITAL | Age: 84
End: 2024-07-21
Payer: MEDICARE

## 2024-07-21 VITALS
RESPIRATION RATE: 17 BRPM | BODY MASS INDEX: 26.61 KG/M2 | HEART RATE: 72 BPM | SYSTOLIC BLOOD PRESSURE: 160 MMHG | WEIGHT: 115 LBS | DIASTOLIC BLOOD PRESSURE: 84 MMHG | HEIGHT: 55 IN | TEMPERATURE: 97.9 F | OXYGEN SATURATION: 100 %

## 2024-07-21 LAB
ANION GAP SERPL CALC-SCNC: 11 MMOL/L (ref 10–20)
BUN SERPL-MCNC: 9 MG/DL (ref 6–23)
CALCIUM SERPL-MCNC: 9.3 MG/DL (ref 8.6–10.3)
CHLORIDE SERPL-SCNC: 112 MMOL/L (ref 98–107)
CO2 SERPL-SCNC: 24 MMOL/L (ref 21–32)
CREAT SERPL-MCNC: 0.48 MG/DL (ref 0.5–1.05)
EGFRCR SERPLBLD CKD-EPI 2021: >90 ML/MIN/1.73M*2
FOLATE SERPL-MCNC: >24 NG/ML
GLUCOSE SERPL-MCNC: 68 MG/DL (ref 74–99)
HOLD SPECIMEN: NORMAL
POTASSIUM SERPL-SCNC: 3.3 MMOL/L (ref 3.5–5.3)
SODIUM SERPL-SCNC: 144 MMOL/L (ref 136–145)
VIT B12 SERPL-MCNC: 1056 PG/ML (ref 211–911)

## 2024-07-21 PROCEDURE — 99232 SBSQ HOSP IP/OBS MODERATE 35: CPT | Performed by: STUDENT IN AN ORGANIZED HEALTH CARE EDUCATION/TRAINING PROGRAM

## 2024-07-21 PROCEDURE — 36415 COLL VENOUS BLD VENIPUNCTURE: CPT | Performed by: INTERNAL MEDICINE

## 2024-07-21 PROCEDURE — 2500000004 HC RX 250 GENERAL PHARMACY W/ HCPCS (ALT 636 FOR OP/ED): Performed by: INTERNAL MEDICINE

## 2024-07-21 PROCEDURE — 2500000001 HC RX 250 WO HCPCS SELF ADMINISTERED DRUGS (ALT 637 FOR MEDICARE OP): Performed by: INTERNAL MEDICINE

## 2024-07-21 PROCEDURE — 70450 CT HEAD/BRAIN W/O DYE: CPT | Performed by: RADIOLOGY

## 2024-07-21 PROCEDURE — 70450 CT HEAD/BRAIN W/O DYE: CPT

## 2024-07-21 PROCEDURE — G0378 HOSPITAL OBSERVATION PER HR: HCPCS

## 2024-07-21 PROCEDURE — 80048 BASIC METABOLIC PNL TOTAL CA: CPT | Performed by: INTERNAL MEDICINE

## 2024-07-21 RX ORDER — FLUTICASONE PROPIONATE 50 MCG
2 SPRAY, SUSPENSION (ML) NASAL 2 TIMES DAILY PRN
Status: DISCONTINUED | OUTPATIENT
Start: 2024-07-21 | End: 2024-07-21 | Stop reason: HOSPADM

## 2024-07-21 RX ORDER — POTASSIUM CHLORIDE 1.5 G/1.58G
40 POWDER, FOR SOLUTION ORAL 2 TIMES DAILY
Status: DISCONTINUED | OUTPATIENT
Start: 2024-07-21 | End: 2024-07-21 | Stop reason: HOSPADM

## 2024-07-21 RX ORDER — AMOXICILLIN 250 MG
1 CAPSULE ORAL DAILY
Status: DISCONTINUED | OUTPATIENT
Start: 2024-07-22 | End: 2024-07-21 | Stop reason: HOSPADM

## 2024-07-21 RX ORDER — PRAVASTATIN SODIUM 40 MG/1
40 TABLET ORAL NIGHTLY
Status: DISCONTINUED | OUTPATIENT
Start: 2024-07-21 | End: 2024-07-21 | Stop reason: HOSPADM

## 2024-07-21 RX ORDER — MONTELUKAST SODIUM 10 MG/1
10 TABLET ORAL DAILY
Status: DISCONTINUED | OUTPATIENT
Start: 2024-07-21 | End: 2024-07-21 | Stop reason: HOSPADM

## 2024-07-21 RX ADMIN — LOSARTAN POTASSIUM 50 MG: 50 TABLET, FILM COATED ORAL at 09:16

## 2024-07-21 RX ADMIN — ASPIRIN 81 MG: 81 TABLET, COATED ORAL at 09:16

## 2024-07-21 RX ADMIN — Medication 200 MG: at 09:16

## 2024-07-21 RX ADMIN — POLYETHYLENE GLYCOL 3350 17 G: 17 POWDER, FOR SOLUTION ORAL at 09:16

## 2024-07-21 ASSESSMENT — COGNITIVE AND FUNCTIONAL STATUS - GENERAL
MOVING FROM LYING ON BACK TO SITTING ON SIDE OF FLAT BED WITH BEDRAILS: A LITTLE
TURNING FROM BACK TO SIDE WHILE IN FLAT BAD: A LITTLE
DRESSING REGULAR UPPER BODY CLOTHING: A LITTLE
TOILETING: A LOT
WALKING IN HOSPITAL ROOM: A LOT
STANDING UP FROM CHAIR USING ARMS: A LITTLE
MOVING TO AND FROM BED TO CHAIR: A LITTLE
HELP NEEDED FOR BATHING: A LITTLE
PERSONAL GROOMING: A LITTLE
CLIMB 3 TO 5 STEPS WITH RAILING: A LOT
DAILY ACTIVITIY SCORE: 16
EATING MEALS: A LITTLE
MOBILITY SCORE: 16
DRESSING REGULAR LOWER BODY CLOTHING: A LOT

## 2024-07-21 ASSESSMENT — PAIN - FUNCTIONAL ASSESSMENT: PAIN_FUNCTIONAL_ASSESSMENT: 0-10

## 2024-07-21 ASSESSMENT — PAIN SCALES - GENERAL: PAINLEVEL_OUTOF10: 0 - NO PAIN

## 2024-07-21 NOTE — CARE PLAN
Problem: Fall/Injury  Goal: Not fall by end of shift  Outcome: Progressing  Goal: Be free from injury by end of the shift  Outcome: Progressing  Goal: Verbalize understanding of risk factor reduction measures to prevent injury from fall in the home  Outcome: Progressing  Goal: Use assistive devices by end of the shift  Outcome: Progressing  Goal: Pace activities to prevent fatigue by end of the shift  Outcome: Progressing

## 2024-07-21 NOTE — DISCHARGE SUMMARY
[facility] Hospitalist Discharge Summary        Kerry WangB: 1940MRN: [id number]    ADMIT DATE: ISCHARGE DATE: 2024    PRIMARYCARE PHYSICIAN: Ivy Brown MD    VISIT STATUS: Inpatient    CODE STATUS: Full Code    DISCHARGE DIAGNOSES:    Principal Problem:    Epidural hematoma (Multi)      CONSULTANTS:  [facility]    HOSPITAL COURSE:    #epidural/subdural hematoma  #mechanical fall  - NSGY consult -> no acute surgical intervention, follow up in 2 weeks for repeat imaging and eval  - repeat head CT stable bleed  - not on anticoag/nothing to reverse  - no seizure ppx or steroids indicated currently    Family wants to take patient home today    DAY OF DISCHARGE:      Patient Vitals for the past 24 hrs:   BP Temp Temp src Pulse Resp SpO2   24 0856 160/84 36.6 °C (97.9 °F) Oral 72 17 100 %   24 0451 157/72 36.6 °C (97.8 °F) Temporal 82 20 99 %   24 0046 122/71 36.9 °C (98.5 °F) Temporal 90 16 97 %   24 2204 103/70 37 °C (98.6 °F) Oral -- 20 97 %   24 -- -- -- 92 -- --   24 1501 145/63 36.7 °C (98.1 °F) -- 62 18 97 %   24 1415 118/74 -- -- (!) 101 16 99 %       Average, Min, and Max forlast 24 hours Vitals:  TEMPERATURE: Temp  Av.8 °C (98.2 °F)  Min: 36.6 °C (97.8 °F)  Max: 37 °C (98.6 °F)    RESPIRATIONS RANGE: Resp  Av.8  Min: 16  Max: 20    PULSE RANGE: Pulse  Av.2  Min: 62  Max: 101    BLOOD PRESSURE RANGE: Systolic (24hrs), Av , Min:103 , Max:160   ; Diastolic (24hrs), Av, Min:63, Max:84      PULSE OXIMETRYRANGE: SpO2  Av.2 %  Min: 97 %  Max: 100 %    I/O last 3 completed shifts:  In: 1545 (29.6 mL/kg) [P.O.:480; I.V.:1065 (20.4 mL/kg)]  Out: - (0 mL/kg)   Weight: 52.2 kg           Discharge Meds       Your medication list        CONTINUE taking these medications        Instructions Last Dose Given Next Dose Due   azelastine 137 mcg (0.1 %) nasal spray  Commonly known as: Astelin           calcium citrate 200 mg (950 mg)  tablet  Commonly known as: Calcitrate      Take 1 tablet (200 mg) by mouth once daily.       LMX 4 4 % cream  Generic drug: lidocaine      Apply topically 4 times a day as needed for mild pain (1 - 3). On the feet       losartan 50 mg tablet  Commonly known as: Cozaar      Take 1 tablet (50 mg) by mouth once daily.       montelukast 10 mg tablet  Commonly known as: Singulair      Take 1 tablet (10 mg) by mouth once daily.       ONE-A-DAY WOMEN'S 50 PLUS ORAL           pravastatin 40 mg tablet  Commonly known as: Pravachol           sennosides-docusate sodium 8.6-50 mg tablet  Commonly known as: Brittny-Colace           triamcinolone 0.1 % ointment  Commonly known as: Kenalog      Apply topically once daily. Small anything if needed for itch              STOP taking these medications      aspirin 81 mg EC tablet        diclofenac sodium 1 % gel  Commonly known as: Voltaren                   FOLLOW UP TESTING, PENDING RESULTS OR REFERRALS AT FOLLOW UP VISIT:   [X] Yes as outlined above   [ ] No    DISPOSITION: Home    FACILITY NAME:     Follow up with PCP Ivy Brown MD    Outpatient Follow-Up Appointments  Future Appointments   Date Time Provider Department Center   7/23/2024 To Be Determined Helen Saini OT [facility]   7/31/2024 To Be Determined Helen Saini OT [facility]   8/12/2024 10:00 AM Ivy Brown MD [id number] [facility]   9/17/2024  1:40 PM Jony Goncalves MD [id number] [facility]   9/25/2024 10:00 AM Ayan Danielle DPM [id number] [place]   5/20/2025  1:00 PM Terry Cardenas MD [id number] [facility] personally examined the patient and reviewed chart.  Plan of care was discussed with patient and family, all questions answered.    DISCHARGE TIME: < 30 minutes providing counseling or in coordination of care. Total time on this day of visit includes record and documentation review before and after visit including documentation and time not explicitly included on EMR time  ivan.    Gissel Cai MD  [facility]

## 2024-07-21 NOTE — PROGRESS NOTES
"Physical Therapy                 Therapy Communication Note    Patient Name: Kerry Arevalo  MRN: [id number]  Today's Date: 7/21/2024     Discipline: Physical Therapy    Missed Visit Reason: Other (Comment) (RN cleared pt for PT evaluation. Pt agitating and stating \"today is not the day\" over and over. Not able to redirect despite education on purpose of PT services. Pt's daughter present and requests holding off on PT at this time due to agitation/confusion)    Missed Time: Attempt    "

## 2024-07-21 NOTE — CARE PLAN
The patient's goals for the shift include      The clinical goals for the shift include Pt to remain safe    Over the shift, the patient did make progress toward the following goals.   Problem: Fall/Injury  Goal: Not fall by end of shift  Outcome: Progressing     Problem: Fall/Injury  Goal: Be free from injury by end of the shift  Outcome: Progressing     Problem: Fall/Injury  Goal: Verbalize understanding of personal risk factors for fall in the hospital  Outcome: Progressing     Problem: Fall/Injury  Goal: Verbalize understanding of risk factor reduction measures to prevent injury from fall in the home  Outcome: Progressing     Problem: Fall/Injury  Goal: Use assistive devices by end of the shift  Outcome: Progressing     Problem: Fall/Injury  Goal: Pace activities to prevent fatigue by end of the shift  Outcome: Progressing

## 2024-07-21 NOTE — PROGRESS NOTES
[facility] Hospitalist Progress Note        Between 7AM-7PM please message me via Epic Secure Chat.  After 7PM please page Nocturnist on call.        Assessment/Plan     #epidural/subdural hematoma  #mechanical fall  - NSGY consult -> no acute surgical intervention, follow up in 2 weeks for repeat imaging and eval  - repeat head CT stable bleed  - not on anticoag/nothing to reverse  - no seizure ppx or steroids indicated currently    #recent DRESS syndrome following sulfa exposure  - has been slowly improving, finished steroid course    #anemia  #thrombocytopenia  - stable    #deconditioning  #poor nutritional intake  - PT/OT consults  - dietary consult      DVT Prophylaxis:  Heparin subq - hold until tomorrow      Discharge Planning: pending PT/OT consults    I personally examined the patient and reviewed chart.  Plan of care was discussed with patient and family, all questions answered.    Total time spent: At least 38 minutes, providing counseling or in coordination of care. Total time on this day of visit includes record and documentation review before and after visit including documentation and time not explicitly included on EMR time stamp.      Subjective     Kerry Arevalo is a 84 y.o. female on day 1 of admission presenting with Epidural hematoma (Multi).    NAEON. Patient unable to provide meaningful hx. Discussed care with family    Review of Systems   Unable to perform ROS: Acuity of condition       Objective     Physical Exam  Constitutional:       General: She is not in acute distress.  Cardiovascular:      Rate and Rhythm: Normal rate and regular rhythm.   Pulmonary:      Effort: Pulmonary effort is normal.      Breath sounds: Normal breath sounds.   Abdominal:      General: There is no distension.      Palpations: Abdomen is soft.   Neurological:      General: No focal deficit present.      Mental Status: She is alert. She is disoriented.      Comments:   Can follow commands         Last Recorded  "Vitals  Blood pressure 160/84, pulse 72, temperature 36.6 °C (97.9 °F), temperature source Oral, resp. rate 17, height 1.397 m (4' 7\"), weight 52.2 kg (115 lb), SpO2 100%.  Intake/Output last 3 Shifts:  I/O last 3 completed shifts:  In: 1545 (29.6 mL/kg) [P.O.:480; I.V.:1065 (20.4 mL/kg)]  Out: - (0 mL/kg)   Weight: 52.2 kg     Relevant Results  Results for orders placed or performed during the hospital encounter of 07/20/24 (from the past 24 hour(s))   Folate   Result Value Ref Range    Folate, Serum >24.0 >5.0 ng/mL   Vitamin B12   Result Value Ref Range    Vitamin B12 1,056 (H) 211 - 911 pg/mL   Comprehensive metabolic panel   Result Value Ref Range    Glucose 85 74 - 99 mg/dL    Sodium 144 136 - 145 mmol/L    Potassium 3.2 (L) 3.5 - 5.3 mmol/L    Chloride 112 (H) 98 - 107 mmol/L    Bicarbonate 23 21 - 32 mmol/L    Anion Gap 12 10 - 20 mmol/L    Urea Nitrogen 12 6 - 23 mg/dL    Creatinine 0.50 0.50 - 1.05 mg/dL    eGFR >90 >60 mL/min/1.73m*2    Calcium 10.2 8.6 - 10.3 mg/dL    Albumin 2.7 (L) 3.4 - 5.0 g/dL    Alkaline Phosphatase 69 33 - 136 U/L    Total Protein 4.9 (L) 6.4 - 8.2 g/dL    AST 26 9 - 39 U/L    Bilirubin, Total 0.7 0.0 - 1.2 mg/dL    ALT 18 7 - 45 U/L   CBC and Auto Differential   Result Value Ref Range    WBC 7.1 4.4 - 11.3 x10*3/uL    nRBC 0.0 0.0 - 0.0 /100 WBCs    RBC 2.29 (L) 4.00 - 5.20 x10*6/uL    Hemoglobin 6.9 (L) 12.0 - 16.0 g/dL    Hematocrit 21.5 (L) 36.0 - 46.0 %    MCV 94 80 - 100 fL    MCH 30.1 26.0 - 34.0 pg    MCHC 32.1 32.0 - 36.0 g/dL    RDW 21.0 (H) 11.5 - 14.5 %    Platelets 181 150 - 450 x10*3/uL    Neutrophils % 65.6 40.0 - 80.0 %    Immature Granulocytes %, Automated 0.3 0.0 - 0.9 %    Lymphocytes % 16.4 13.0 - 44.0 %    Monocytes % 17.7 2.0 - 10.0 %    Eosinophils % 0.0 0.0 - 6.0 %    Basophils % 0.0 0.0 - 2.0 %    Neutrophils Absolute 4.65 1.60 - 5.50 x10*3/uL    Immature Granulocytes Absolute, Automated 0.02 0.00 - 0.50 x10*3/uL    Lymphocytes Absolute 1.16 0.80 - 3.00 " x10*3/uL    Monocytes Absolute 1.25 (H) 0.05 - 0.80 x10*3/uL    Eosinophils Absolute 0.00 0.00 - 0.40 x10*3/uL    Basophils Absolute 0.00 0.00 - 0.10 x10*3/uL   Magnesium   Result Value Ref Range    Magnesium 6.50 (HH) 1.60 - 2.40 mg/dL   Type And Screen   Result Value Ref Range    ABO TYPE O     Rh TYPE POS     ANTIBODY SCREEN NEG    Protime-INR   Result Value Ref Range    Protime 13.9 (H) 9.8 - 12.8 seconds    INR 1.2 (H) 0.9 - 1.1   CBC   Result Value Ref Range    WBC 7.2 4.4 - 11.3 x10*3/uL    nRBC 0.0 0.0 - 0.0 /100 WBCs    RBC 2.50 (L) 4.00 - 5.20 x10*6/uL    Hemoglobin 7.5 (L) 12.0 - 16.0 g/dL    Hematocrit 24.9 (L) 36.0 - 46.0 %     80 - 100 fL    MCH 30.0 26.0 - 34.0 pg    MCHC 30.1 (L) 32.0 - 36.0 g/dL    RDW 21.5 (H) 11.5 - 14.5 %    Platelets 145 (L) 150 - 450 x10*3/uL   Magnesium   Result Value Ref Range    Magnesium 1.60 1.60 - 2.40 mg/dL   Lavender Top   Result Value Ref Range    Extra Tube Hold for add-ons.    Basic metabolic panel   Result Value Ref Range    Glucose 68 (L) 74 - 99 mg/dL    Sodium 144 136 - 145 mmol/L    Potassium 3.3 (L) 3.5 - 5.3 mmol/L    Chloride 112 (H) 98 - 107 mmol/L    Bicarbonate 24 21 - 32 mmol/L    Anion Gap 11 10 - 20 mmol/L    Urea Nitrogen 9 6 - 23 mg/dL    Creatinine 0.48 (L) 0.50 - 1.05 mg/dL    eGFR >90 >60 mL/min/1.73m*2    Calcium 9.3 8.6 - 10.3 mg/dL       Imaging Results  CT head wo IV contrast    Result Date: 7/21/2024  Interpreted By:  Jing Saunders, STUDY: CT HEAD WO IV CONTRAST;  7/21/2024 8:50 am   INDICATION: Signs/Symptoms:24 hour bleed stability scan.   COMPARISON: CT brain 07/20/2024.   ACCESSION NUMBER(S): [id number]   ORDERING CLINICIAN: NANCY LOZADA   TECHNIQUE: CT axial images through the Brain were obtained without contrast.   All CT exams are performed with 1 or more of the following dose reduction techniques: Automatic exposure control, adjustment of mA and/or kv according to patient size, or use of iterative reconstruction techniques.    FINDINGS: Again seen is a lentiform shaped right temporoparietal extra-axial hemorrhage most likely epidural hematoma or less likely subdural hematoma. This has a maximum measurement of 9 mm in thickness and is unchanged since the prior exam. Mild cerebral atrophy noted. Small right basal ganglia remote infarct.   Opacified right maxillary sinus again seen.. The visualized portions of the orbits are unremarkable.       1. Stable right temporoparietal extra-axial hemorrhage most likely an epidural hematoma. 2. Unchanged remote tiny right basal ganglia lacunar infarct     MACRO: None.   Signed by: Jing Saunders 7/21/2024 10:34 AM Dictation workstation:   [id number]    CT head wo IV contrast    Result Date: 7/20/2024  Interpreted By:  Randy Resendiz, STUDY: CT HEAD WO IV CONTRAST;  7/20/2024 4:18 pm   INDICATION: Signs/Symptoms:follow up CT head at 4 pm to confirm bleed isn't expanding.   COMPARISON: 07/20/2024   ACCESSION NUMBER(S): [id number]   ORDERING CLINICIAN: KALEB CASTILLO   TECHNIQUE: Noncontrast axial CT images of head were obtained with coronal and sagittal reconstructed images.   FINDINGS: Previously described lentiform shaped extra-axial hemorrhage on the right side, again either epidural hematoma or less likely subdural hematoma is redemonstrated which appears essentially unchanged. Currently measures up to 9 mm in maximum thickness. No significant new mass effect or midline shift. No new hydrocephalus. No new significant effacement of cerebral sulci or basal cisterns.   Opacification right maxillary sinus redemonstrated.       Stable exam.     MACRO: None.   Signed by: Randy Resendiz 7/20/2024 6:55 PM Dictation workstation:   [id number]    XR chest 1 view    Result Date: 7/20/2024  STUDY: Chest Radiograph;  7/20/24 at 9:55 AM INDICATION: Weakness. COMPARISON: Chest XR 6/7/24. ACCESSION NUMBER(S): [id number] ORDERING CLINICIAN: CHARITO HOANG TECHNIQUE:  Frontal chest was obtained at 0954 hours.  FINDINGS: CARDIOMEDIASTINAL SILHOUETTE: Cardiomediastinal silhouette is normal in size and configuration. Vascular calcifications are seen.  LUNGS: Chronic interstitial lung changes are identified.  ABDOMEN: No remarkable upper abdominal findings.  BONES: Postoperative changes of the right shoulder are appreciated.    No radiographic evidence of acute cardiopulmonary process. Signed by Fernanda Kincaid MD    CT cervical spine wo IV contrast    Result Date: 7/20/2024  Interpreted By:  Jing Saunders, STUDY: CT CERVICAL SPINE WO IV CONTRAST;  7/20/2024 9:50 am   INDICATION: Signs/Symptoms:fall with head injury.   COMPARISON: None.   ACCESSION NUMBER(S): [id number]   ORDERING CLINICIAN: CHARITO HOANG   TECHNIQUE: Axial CT images of the cervical spine are obtained. Axial, coronal and sagittal reconstructions are provided for review.   All CT exams are performed with 1 or more of the following dose reduction techniques: Automatic exposure control, adjustment of mA and/or kv according to patient size, or use of iterative reconstruction techniques.   FINDINGS: There is no acute fracture or traumatic subluxation. Multilevel degenerative changes are noted with arthritic pseudo subluxation at C4 on C5 and C5 on C6. Multilevel disc space narrowing and spurring seen. Facet hypertrophy noted. No prevertebral soft tissue swelling. Endplate irregularity at C6-7 is likely due to chronic degenerative change.   Right temporal extra-axial hemorrhage noted described on the CT of the brain.   In the left lung apex there is a 1 cm hazy ill-defined density         1. No fracture or posttraumatic subluxation. 2. Multilevel cervical spondylosis 3. Right temporal extra-axial acute hemorrhage 4. 1 cm hazy ill-defined density in the left lung apex not entirely included on this exam. This may be due to inflammatory or neoplastic condition and a follow-up nonemergent CT of the chest is needed to fully evaluate this density   MACRO: Critical Finding:   See findings. Notification was initiated on 7/20/2024 at 10:07 am by  Jing Saunders.  (**-YCF-**) Instructions:   Signed by: Jing Saunders 7/20/2024 10:07 AM Dictation workstation:   [id number]    CT head wo IV contrast    Result Date: 7/20/2024  Interpreted By:  Jing Saunders, STUDY: CT HEAD WO IV CONTRAST;  7/20/2024 9:50 am   INDICATION: Signs/Symptoms:fall with head injury.   COMPARISON: 06/07/2024.   ACCESSION NUMBER(S): [id number]   ORDERING CLINICIAN: CHARITO HOANG   TECHNIQUE: CT axial images through the Brain were obtained without contrast.   All CT exams are performed with 1 or more of the following dose reduction techniques: Automatic exposure control, adjustment of mA and/or kv according to patient size, or use of iterative reconstruction techniques.   FINDINGS: In the right temporal region there is a 4.9 cm AP by 1.0 cm transverse by 6.5 cm craniocaudal acute lentiform shaped hemorrhage possibly an acute epidural hematoma versus an acute subdural hematoma. The lentiform shape favors an epidural bleed.   There is no midline shift. Cerebral atrophy is noted with nonspecific changes of the white matter probably due to chronic microvascular disease..   There is paranasal sinus mucosal thickening especially involving the right maxillary sinus with right maxillary antrostomy. No depressed skull fracture is noted..       1. Acute right temporal lentiform shaped extra-axial hemorrhage either acute epidural hematoma or less likely acute subdural hematoma. This could also represent a combination of a epidural bleed and a subdural bleed. Surgical consultation is recommended. 2. The findings were discussed with the ordering healthcare provider at 10:01 a.m. on 07/20/2024.   MACRO: Jing Saunders discussed the significance and urgency of this critical finding by telephone with  CHARITO HOANG on 7/20/2024 at 10:01 am. (**-RCF-**) Findings:  See findings.   Signed by: Jing Saunders 7/20/2024 10:02 AM Dictation  workstation:   [id number]      Medications:  aspirin, 81 mg, oral, Daily  calcium citrate, 200 mg, oral, Daily  [Held by provider] heparin (porcine), 5,000 Units, subcutaneous, q8h ELYSE  losartan, 50 mg, oral, Daily  pantoprazole, 40 mg, intravenous, BID  polyethylene glycol, 17 g, oral, Daily  [START ON 7/22/2024] sennosides-docusate sodium, 1 tablet, oral, Daily       PRN medications: acetaminophen **OR** acetaminophen **OR** acetaminophen, benzocaine-menthol, fluticasone, lidocaine, melatonin, ondansetron **OR** ondansetron       Gissel Cai MD  [facility]

## 2024-07-21 NOTE — NURSING NOTE
Upon arrival at the patients bedside the patient was sounding the bed alarm attempting to get up oob despite being confused. The patient is AO+1-2, confused about situation and time, neurological status unchanged throughout the shift. Head to toe findings recorded, denies pain ABC's intact, good cap refill, no sob, c/p, n/v or dizziness rounding ensued care transferred without incidence.

## 2024-07-22 ENCOUNTER — HOME CARE VISIT (OUTPATIENT)
Dept: HOME HEALTH SERVICES | Facility: HOME HEALTH | Age: 84
End: 2024-07-22
Payer: MEDICARE

## 2024-07-22 LAB
ATRIAL RATE: 104 BPM
P AXIS: 64 DEGREES
P OFFSET: 211 MS
P ONSET: 158 MS
PR INTERVAL: 124 MS
Q ONSET: 220 MS
QRS COUNT: 17 BEATS
QRS DURATION: 68 MS
QT INTERVAL: 312 MS
QTC CALCULATION(BAZETT): 410 MS
QTC FREDERICIA: 374 MS
R AXIS: 52 DEGREES
T AXIS: 10 DEGREES
T OFFSET: 376 MS
VENTRICULAR RATE: 104 BPM

## 2024-07-23 ENCOUNTER — HOME CARE VISIT (OUTPATIENT)
Dept: HOME HEALTH SERVICES | Facility: HOME HEALTH | Age: 84
End: 2024-07-23
Payer: MEDICARE

## 2024-07-23 PROCEDURE — G0152 HHCP-SERV OF OT,EA 15 MIN: HCPCS

## 2024-07-23 ASSESSMENT — ACTIVITIES OF DAILY LIVING (ADL): ENTERING_EXITING_HOME: MODERATE ASSIST

## 2024-07-24 ASSESSMENT — ACTIVITIES OF DAILY LIVING (ADL): OASIS_M1830: 02

## 2024-07-25 NOTE — ED PROCEDURE NOTE
Procedure  Critical Care    Performed by: Maximo Mak MD  Authorized by: Maximo Mak MD    Critical care provider statement:     Critical care time (minutes):  45    Critical care time was exclusive of:  Separately billable procedures and treating other patients    Critical care was necessary to treat or prevent imminent or life-threatening deterioration of the following conditions:  CNS failure or compromise    Critical care was time spent personally by me on the following activities:  Blood draw for specimens, development of treatment plan with patient or surrogate, discussions with consultants, evaluation of patient's response to treatment, discussions with primary provider, examination of patient, ordering and performing treatments and interventions, ordering and review of laboratory studies, ordering and review of radiographic studies and pulse oximetry    Care discussed with: admitting provider                 Maximo Mak MD  07/25/24 [id number]

## 2024-07-26 DIAGNOSIS — I10 HYPERTENSION, UNSPECIFIED TYPE: ICD-10-CM

## 2024-07-26 RX ORDER — LOSARTAN POTASSIUM 50 MG/1
50 TABLET ORAL DAILY
Qty: 90 TABLET | Refills: 0 | Status: SHIPPED | OUTPATIENT
Start: 2024-07-26

## 2024-07-29 DIAGNOSIS — H53.8 BLURRED VISION, BILATERAL: ICD-10-CM

## 2024-08-05 ENCOUNTER — HOSPITAL ENCOUNTER (OUTPATIENT)
Dept: RADIOLOGY | Facility: HOSPITAL | Age: 84
Discharge: HOME | End: 2024-08-05
Payer: MEDICARE

## 2024-08-05 DIAGNOSIS — I62.00 SUBDURAL BLEEDING (MULTI): ICD-10-CM

## 2024-08-05 PROCEDURE — 70450 CT HEAD/BRAIN W/O DYE: CPT | Performed by: RADIOLOGY

## 2024-08-05 PROCEDURE — 70450 CT HEAD/BRAIN W/O DYE: CPT

## 2024-08-07 ENCOUNTER — HOME CARE VISIT (OUTPATIENT)
Dept: HOME HEALTH SERVICES | Facility: HOME HEALTH | Age: 84
End: 2024-08-07
Payer: MEDICARE

## 2024-08-07 PROCEDURE — G0152 HHCP-SERV OF OT,EA 15 MIN: HCPCS

## 2024-08-08 ASSESSMENT — ACTIVITIES OF DAILY LIVING (ADL)
FEEDING ASSESSED: 1
TRANSPORTATION ASSESSED: 1
LIGHT HOUSEKEEPING: DEPENDENT
TOILETING: 1
USING THE TELPHONE: NEEDS ASSISTANCE
PREPARING MEALS: DEPENDENT
TELEPHONE USE ASSESSED: 1
PHYSICAL TRANSFERS ASSESSED: 1
SHOPPING: DEPENDENT
HOUSEKEEPING ASSESSED: 1
TOILETING: MAXIMUM ASSIST
ORAL_CARE_CURRENT_FUNCTION: INDEPENDENT
ORAL_CARE_ASSESSED: 1
BATHING ASSESSED: 1
GROOMING_CURRENT_FUNCTION: INDEPENDENT
BATHING_CURRENT_FUNCTION: STAND BY ASSIST
FEEDING: CONTACT GUARD ASSIST
LAUNDRY: DEPENDENT
LAUNDRY ASSESSED: 1
GROOMING ASSESSED: 1
SHOPPING ASSESSED: 1
CURRENT_FUNCTION: MINIMUM ASSIST
TRANSPORTATION: DEPENDENT
DRESSING_UB_CURRENT_FUNCTION: INDEPENDENT

## 2024-08-08 ASSESSMENT — ENCOUNTER SYMPTOMS
PAIN: 1
PERSON REPORTING PAIN: PATIENT
PAIN LOCATION: LEFT FOOT
PAIN SEVERITY GOAL: 0/10
LOWEST PAIN SEVERITY IN PAST 24 HOURS: 4/10
SUBJECTIVE PAIN PROGRESSION: WAXING AND WANING
HIGHEST PAIN SEVERITY IN PAST 24 HOURS: 10/10
PAIN LOCATION - PAIN SEVERITY: 4/10

## 2024-08-09 ASSESSMENT — ENCOUNTER SYMPTOMS
PAIN LOCATION - PAIN FREQUENCY: CONSTANT
PAIN LOCATION - PAIN QUALITY: ACHE

## 2024-08-12 ENCOUNTER — LAB (OUTPATIENT)
Dept: LAB | Facility: LAB | Age: 84
End: 2024-08-12
Payer: MEDICARE

## 2024-08-12 ENCOUNTER — APPOINTMENT (OUTPATIENT)
Dept: PRIMARY CARE | Facility: CLINIC | Age: 84
End: 2024-08-12
Payer: MEDICARE

## 2024-08-12 ENCOUNTER — HOME CARE VISIT (OUTPATIENT)
Dept: HOME HEALTH SERVICES | Facility: HOME HEALTH | Age: 84
End: 2024-08-12
Payer: MEDICARE

## 2024-08-12 VITALS — SYSTOLIC BLOOD PRESSURE: 152 MMHG | DIASTOLIC BLOOD PRESSURE: 72 MMHG

## 2024-08-12 DIAGNOSIS — E87.6 HYPOKALEMIA: ICD-10-CM

## 2024-08-12 DIAGNOSIS — T50.905A DRESS SYNDROME: ICD-10-CM

## 2024-08-12 DIAGNOSIS — R53.81 PHYSICAL DECONDITIONING: ICD-10-CM

## 2024-08-12 DIAGNOSIS — I10 HYPERTENSION, UNSPECIFIED TYPE: ICD-10-CM

## 2024-08-12 DIAGNOSIS — R21 RASH AND OTHER NONSPECIFIC SKIN ERUPTION: ICD-10-CM

## 2024-08-12 DIAGNOSIS — D50.9 IRON DEFICIENCY ANEMIA, UNSPECIFIED IRON DEFICIENCY ANEMIA TYPE: Primary | ICD-10-CM

## 2024-08-12 DIAGNOSIS — D50.9 IRON DEFICIENCY ANEMIA, UNSPECIFIED IRON DEFICIENCY ANEMIA TYPE: ICD-10-CM

## 2024-08-12 DIAGNOSIS — D72.12 DRESS SYNDROME: ICD-10-CM

## 2024-08-12 DIAGNOSIS — S06.4XAA EPIDURAL HEMATOMA (MULTI): ICD-10-CM

## 2024-08-12 LAB
ALBUMIN SERPL BCP-MCNC: 3.4 G/DL (ref 3.4–5)
ALP SERPL-CCNC: 69 U/L (ref 33–136)
ALT SERPL W P-5'-P-CCNC: 10 U/L (ref 7–45)
ANION GAP SERPL CALC-SCNC: 13 MMOL/L (ref 10–20)
AST SERPL W P-5'-P-CCNC: 18 U/L (ref 9–39)
BASOPHILS # BLD AUTO: 0.01 X10*3/UL (ref 0–0.1)
BASOPHILS NFR BLD AUTO: 0.2 %
BILIRUB SERPL-MCNC: 0.4 MG/DL (ref 0–1.2)
BUN SERPL-MCNC: 13 MG/DL (ref 6–23)
CALCIUM SERPL-MCNC: 9.1 MG/DL (ref 8.6–10.6)
CHLORIDE SERPL-SCNC: 106 MMOL/L (ref 98–107)
CO2 SERPL-SCNC: 23 MMOL/L (ref 21–32)
CREAT SERPL-MCNC: 0.5 MG/DL (ref 0.5–1.05)
EGFRCR SERPLBLD CKD-EPI 2021: >90 ML/MIN/1.73M*2
EOSINOPHIL # BLD AUTO: 0 X10*3/UL (ref 0–0.4)
EOSINOPHIL NFR BLD AUTO: 0 %
ERYTHROCYTE [DISTWIDTH] IN BLOOD BY AUTOMATED COUNT: 19.9 % (ref 11.5–14.5)
GLUCOSE SERPL-MCNC: 105 MG/DL (ref 74–99)
HCT VFR BLD AUTO: 29.3 % (ref 36–46)
HGB BLD-MCNC: 9 G/DL (ref 12–16)
IMM GRANULOCYTES # BLD AUTO: 0.02 X10*3/UL (ref 0–0.5)
IMM GRANULOCYTES NFR BLD AUTO: 0.4 % (ref 0–0.9)
LYMPHOCYTES # BLD AUTO: 1.2 X10*3/UL (ref 0.8–3)
LYMPHOCYTES NFR BLD AUTO: 21.7 %
MCH RBC QN AUTO: 31.7 PG (ref 26–34)
MCHC RBC AUTO-ENTMCNC: 30.7 G/DL (ref 32–36)
MCV RBC AUTO: 103 FL (ref 80–100)
MONOCYTES # BLD AUTO: 0.57 X10*3/UL (ref 0.05–0.8)
MONOCYTES NFR BLD AUTO: 10.3 %
NEUTROPHILS # BLD AUTO: 3.73 X10*3/UL (ref 1.6–5.5)
NEUTROPHILS NFR BLD AUTO: 67.4 %
NRBC BLD-RTO: 0 /100 WBCS (ref 0–0)
PLATELET # BLD AUTO: 246 X10*3/UL (ref 150–450)
POTASSIUM SERPL-SCNC: 4 MMOL/L (ref 3.5–5.3)
PROT SERPL-MCNC: 5.9 G/DL (ref 6.4–8.2)
RBC # BLD AUTO: 2.84 X10*6/UL (ref 4–5.2)
SODIUM SERPL-SCNC: 138 MMOL/L (ref 136–145)
WBC # BLD AUTO: 5.5 X10*3/UL (ref 4.4–11.3)

## 2024-08-12 PROCEDURE — 3078F DIAST BP <80 MM HG: CPT | Performed by: INTERNAL MEDICINE

## 2024-08-12 PROCEDURE — G2211 COMPLEX E/M VISIT ADD ON: HCPCS | Performed by: INTERNAL MEDICINE

## 2024-08-12 PROCEDURE — 36415 COLL VENOUS BLD VENIPUNCTURE: CPT

## 2024-08-12 PROCEDURE — 85025 COMPLETE CBC W/AUTO DIFF WBC: CPT

## 2024-08-12 PROCEDURE — 80053 COMPREHEN METABOLIC PANEL: CPT

## 2024-08-12 PROCEDURE — 1159F MED LIST DOCD IN RCRD: CPT | Performed by: INTERNAL MEDICINE

## 2024-08-12 PROCEDURE — 99215 OFFICE O/P EST HI 40 MIN: CPT | Performed by: INTERNAL MEDICINE

## 2024-08-12 PROCEDURE — 3077F SYST BP >= 140 MM HG: CPT | Performed by: INTERNAL MEDICINE

## 2024-08-12 ASSESSMENT — ENCOUNTER SYMPTOMS
RESPIRATORY NEGATIVE: 1
FATIGUE: 1
WEAKNESS: 1
WOUND: 1
GASTROINTESTINAL NEGATIVE: 1
APPETITE CHANGE: 1
CARDIOVASCULAR NEGATIVE: 1

## 2024-08-12 NOTE — PROGRESS NOTES
"[facility]   Neurosurgery    Diagnosis  Kerry \"Kerry Arevalo\" was seen today for follow-up.  Diagnoses and all orders for this visit:  Epidural hematoma (Multi)  -     CT head wo IV contrast; Future      Patient Discussion/Summary  1) Today we discussed the nature of subdural hematomas and epidural hematomas. We reviewed your recent CT of the head and the implications of this.    2) You may resume your aspirin for cardioprotection, at this time. I would also like to see you back in 2-3 weeks, with a repeat head CT, to reassess your hematoma and overall progress. This may be virtual or in person.     3) In the interim, we discussed signs and symptoms to monitor and to notify our office and present to the ER, should they occur.      Provider Impressions  84 y.o. female with findings of a R temporal EDH on 7/20/24, following a mechanical fall backwards while standing at her fridge and felt unsteady, +head strike and -LOC. Today she is doing overall well and is asymptomatic, regarding her head bleed. She does endorse a history of frequent falls, due to imbalance, and is in PT. She takes ASA for prevention, per her PCP, and has not yet resumed this following her hospitalization. The most recent head CT was reviewed today and showed interval evolution and decrease in R EDH. She may resume her ASA for cardioprotection, if advised by her PCP. We will plan to see her back in 2-3 weeks, with repeat head CT, to reassess her head bleed and overall progress. In the interim, we discussed red flag symptoms to monitor and present to the ED, should they occur.       History of Present Illness  Chief Complaint:   Chief Complaint   Patient presents with    Follow-up     Follow up ct scan          HPI: Kerry Arevalo is a 84 y.o. female with PMH of HTN, DM II, OA/RA, spinal stenosis with lumbar radiculopathy, and recent DRESS (hospitalized 6/2024, in setting of sulfa drug), who presented to [facility]24 following a " mechanical fall backwards while standing at her fridge and felt unsteady, +head strike and -LOC; CTH showed small R temporal EDH. Patient does not take OAC, and takes ASA for heart protection. NSGY was consulted, rCTH was stable, and recommendations were to hold ASA until 2 week outpatient follow up with repeat head CT. Patient presents today for hospital follow up of EDH.     Patient reports to be doing overall well since her hospitalization and is relatively asymptomatic, regarding her head bleed. She reports some visual changes at baseline, due to cataracts, but denies headaches, confusion, weakness or numbness. Her daughter states that the patient was complaining of headaches prior to her fall, however, the patient states that she feels this was due to poor food intake, and states these have since resolved. Patient does indorse frequent falls, due to baseline imbalance, but denies dizziness and states she is in PT. Her daughter's state she previously had an Echo, due to cardiac murmur, and was told this was unremarkable (report unavailable). She states she takes aspirin for prevention, per her PCP, and has not yet resumed this.       ROS: As noted in HPI.      Previous History  Past Medical History:   Diagnosis Date    Body mass index (BMI) 25.0-25.9, adult 12/18/2019    Body mass index (BMI) of 25.0 to 25.9 in adult    Body mass index (BMI) 27.0-27.9, adult 04/26/2022    Body mass index (BMI) of 27.0 to 27.9 in adult    Body mass index (BMI) 28.0-28.9, adult 06/14/2022    Body mass index (BMI) of 28.0 to 28.9 in adult    Body mass index (BMI) 29.0-29.9, adult 08/16/2021    Body mass index (BMI) of 29.0 to 29.9 in adult    Body mass index (BMI)30.0-30.9, adult 01/27/2021    Body mass index (BMI) of 30.0 to 30.9 in adult    Disappearance and death of family member 10/31/2013    Bereavement, uncomplicated    Encounter for screening mammogram for malignant neoplasm of breast 06/27/2016    Encounter for screening  mammogram for breast cancer    Macular cyst, hole, or pseudohole, right eye     Macular hole, right    Menopausal and female climacteric states 11/04/2015    Menopausal symptoms    Other postherpetic nervous system involvement 07/24/2017    Postherpetic neuralgia    Other specified disorders of left ear 12/27/2016    Fullness in ear, left    Personal history of diseases of the blood and blood-forming organs and certain disorders involving the immune mechanism 02/19/2013    History of anemia    Personal history of diseases of the skin and subcutaneous tissue 05/12/2013    History of hair or hair follicle disorder    Personal history of other diseases of the musculoskeletal system and connective tissue     History of arthritis    Personal history of other diseases of the nervous system and sense organs     History of cataract    Personal history of other diseases of the respiratory system 03/04/2019    History of acute bronchitis    Personal history of other infectious and parasitic diseases 03/09/2017    History of herpes zoster    Personal history of other specified conditions 04/11/2019    History of weight loss    Unspecified asthma, uncomplicated ([facility]) 12/19/2022    Asthma     Past Surgical History:   Procedure Laterality Date    CATARACT EXTRACTION  10/14/2014    Cataract Surgery    COLONOSCOPY  02/19/2013    Complete Colonoscopy    HERNIA REPAIR  09/26/2016    Hernia Repair    OTHER SURGICAL HISTORY  05/12/2013    Repair Of Retinal Detachment With Macular Detachment    TOTAL ABDOMINAL HYSTERECTOMY W/ BILATERAL SALPINGOOPHORECTOMY  02/19/2013    Total Abdominal Hysterectomy With Removal Of Both Ovaries     Social History     Tobacco Use    Smoking status: Never    Smokeless tobacco: Never   Substance Use Topics    Alcohol use: Not Currently    Drug use: Never     Family History   Problem Relation Name Age of Onset    Heart failure Mother      Osteoporosis Mother      Other (cerebral hemorrhage) Father       "Alzheimer's disease Sister      Heart attack Brother      Breast cancer Other       Allergies   Allergen Reactions    Sulfasalazine Dermatitis and Other     DRESS syndrome    Duloxetine Other     Weight loss    Topiramate Other     Appetite and  weight loss     Current Outpatient Medications   Medication Instructions    azelastine (Astelin) 137 mcg (0.1 %) nasal spray 2 sprays, Each Nostril, 2 times daily    calcium citrate (CALCITRATE) 200 mg, oral, Daily    losartan (COZAAR) 50 mg, oral, Daily    montelukast (SINGULAIR) 10 mg, oral, Daily    multivit-minerals/folic acid (ONE-A-DAY WOMEN'S 50 PLUS ORAL) 1 tablet, oral, Daily    pravastatin (PRAVACHOL) 40 mg, oral, Nightly    sennosides-docusate sodium (Brittny-Colace) 8.6-50 mg tablet 1 tablet, oral, Daily RT         Vitals  /73   Pulse 79   Resp 19   Ht 1.397 m (4' 7\")   Wt 45.8 kg (101 lb)   BMI 23.47 kg/m²       Physical Exam  Well appearing, in no acute distress. CN 2-12 intact. Equal strengths throughout upper and lower extremities. Sensation intact.       Results  I personally reviewed the most recent head CT from 8/5/24 demonstrating interval evolution and improvement in R EDH, now measuring ~8mm (previously 11mm), with mildly improved to stable mass affect and similar MLS, compared to prior imaging.                  "

## 2024-08-12 NOTE — PROGRESS NOTES
Chief Complaint:   Chief Complaint   Patient presents with    Hospital Follow-up      HPI    Kerry Arevalo is a 84 y.o. year old female who presents to the clinic for hospital follow up      Past Medical History   Past Medical History:   Diagnosis Date    Body mass index (BMI) 25.0-25.9, adult 12/18/2019    Body mass index (BMI) of 25.0 to 25.9 in adult    Body mass index (BMI) 27.0-27.9, adult 04/26/2022    Body mass index (BMI) of 27.0 to 27.9 in adult    Body mass index (BMI) 28.0-28.9, adult 06/14/2022    Body mass index (BMI) of 28.0 to 28.9 in adult    Body mass index (BMI) 29.0-29.9, adult 08/16/2021    Body mass index (BMI) of 29.0 to 29.9 in adult    Body mass index (BMI)30.0-30.9, adult 01/27/2021    Body mass index (BMI) of 30.0 to 30.9 in adult    Disappearance and death of family member 10/31/2013    Bereavement, uncomplicated    Encounter for screening mammogram for malignant neoplasm of breast 06/27/2016    Encounter for screening mammogram for breast cancer    Macular cyst, hole, or pseudohole, right eye     Macular hole, right    Menopausal and female climacteric states 11/04/2015    Menopausal symptoms    Other postherpetic nervous system involvement 07/24/2017    Postherpetic neuralgia    Other specified disorders of left ear 12/27/2016    Fullness in ear, left    Personal history of diseases of the blood and blood-forming organs and certain disorders involving the immune mechanism 02/19/2013    History of anemia    Personal history of diseases of the skin and subcutaneous tissue 05/12/2013    History of hair or hair follicle disorder    Personal history of other diseases of the musculoskeletal system and connective tissue     History of arthritis    Personal history of other diseases of the nervous system and sense organs     History of cataract    Personal history of other diseases of the respiratory system 03/04/2019    History of acute bronchitis    Personal history of other infectious and  parasitic diseases 03/09/2017    History of herpes zoster    Personal history of other specified conditions 04/11/2019    History of weight loss    Unspecified asthma, uncomplicated ([facility]) 12/19/2022    Asthma      Past Surgical History:   Past Surgical History:   Procedure Laterality Date    CATARACT EXTRACTION  10/14/2014    Cataract Surgery    COLONOSCOPY  02/19/2013    Complete Colonoscopy    HERNIA REPAIR  09/26/2016    Hernia Repair    OTHER SURGICAL HISTORY  05/12/2013    Repair Of Retinal Detachment With Macular Detachment    TOTAL ABDOMINAL HYSTERECTOMY W/ BILATERAL SALPINGOOPHORECTOMY  02/19/2013    Total Abdominal Hysterectomy With Removal Of Both Ovaries     Family History:   Family History   Problem Relation Name Age of Onset    Heart failure Mother      Osteoporosis Mother      Other (cerebral hemorrhage) Father      Alzheimer's disease Sister      Heart attack Brother      Breast cancer Other       Social History:   Tobacco Use: Low Risk  (6/8/2024)    Patient History     Smoking Tobacco Use: Never     Smokeless Tobacco Use: Never     Passive Exposure: Not on file      Social History     Substance and Sexual Activity   Alcohol Use Not Currently        Allergies:   Allergies   Allergen Reactions    Sulfasalazine Dermatitis and Other     DRESS syndrome    Duloxetine Other     Weight loss    Topiramate Other     Appetite and  weight loss        ROS   Review of Systems   Constitutional:  Positive for appetite change and fatigue.   Respiratory: Negative.     Cardiovascular: Negative.    Gastrointestinal: Negative.    Musculoskeletal:  Positive for gait problem.   Skin:  Positive for rash and wound.   Neurological:  Positive for weakness.        Objective   Vitals:    08/12/24 1002   BP: 152/72      BMI Readings from Last 15 Encounters:   07/20/24 26.73 kg/m²   06/18/24 26.73 kg/m²   06/09/24 28.34 kg/m²   05/20/24 27.89 kg/m²   04/23/24 28.59 kg/m²   02/20/24 30.68 kg/m²   12/22/23 29.90 kg/m²    10/30/23 29.66 kg/m²   09/08/23 29.66 kg/m²   07/31/23 29.66 kg/m²   05/25/23 29.17 kg/m²   03/28/23 28.93 kg/m²   01/23/23 28.45 kg/m²   12/19/22 28.45 kg/m²   09/13/22 27.97 kg/m²      52.2 kg (115 lb) (7/20/2024  9:06 AM)      Physical Exam  Physical Exam  Constitutional:       Appearance: She is well-developed.   Cardiovascular:      Rate and Rhythm: Normal rate and regular rhythm.      Heart sounds: Normal heart sounds. No murmur heard.  Pulmonary:      Effort: Pulmonary effort is normal.      Breath sounds: Normal breath sounds.   Abdominal:      General: Bowel sounds are normal.      Palpations: Abdomen is soft.      There is a generalized healing rash on the body, palms, soles  Trace LE edema      Labs:  CBC:  Recent Labs     07/20/24  1833 07/20/24  1357 07/20/24  1024   WBC 7.2 7.1 8.1   HGB 7.5* 6.9* 7.8*   HCT 24.9* 21.5* 24.2*   * 181 239    94 93     CMP:  Recent Labs     07/21/24  0720 07/20/24  1357 07/20/24  1024    144 144   K 3.3* 3.2* 3.6   * 112* 110*   CO2 24 23 23   ANIONGAP 11 12 15   BUN 9 12 13   CREATININE 0.48* 0.50 0.45*   EGFR >90 >90 >90   GLUCOSE 68* 85 103*     Recent Labs     07/20/24  1357 07/20/24  1024 07/18/24  1045   ALBUMIN 2.7* 3.3* 3.3*   ALKPHOS 69 84 79   ALT 18 23 23   AST 26 36 26   BILITOT 0.7 0.9 1.0     Calcium/Phos:   Lab Results   Component Value Date    CALCIUM 9.3 07/21/2024    PHOS 2.2 (L) 06/13/2024      COAG:   Recent Labs     07/20/24  1357 07/20/24  1024 06/09/24  1746   INR 1.2* 1.2* 1.5*     CRP:   Lab Results   Component Value Date    CRP 2.07 (H) 07/20/2024      [unfilled]   ENDO:  Recent Labs     07/20/24  1024 07/05/24  0958 06/07/24  2136 10/30/23  1343 07/31/23  1410 12/19/22  1152   TSH 1.61 1.22 1.74 1.10 1.41 1.63   HGBA1C 5.4  --   --  5.8* 5.7* 5.6      CARDIAC:   Recent Labs     07/20/24  1024 06/07/24  2136   TROPHS 7 15     Recent Labs     10/30/23  1343 07/31/23  1410 12/19/22  1152 04/26/22  1143   CHOL 198  "191 195 222*   LDLF  --  110* 114* 131*   LDLCALC 105*  --   --   --    HDL 55.3 52.3 49.5 59.1   TRIG 191* 142 158* 160*     No data recorded    Current Medications:  Current Outpatient Medications   Medication Sig Dispense Refill    azelastine (Astelin) 137 mcg (0.1 %) nasal spray Administer 2 sprays into each nostril 2 times a day.      calcium citrate (Calcitrate) 200 mg (950 mg) tablet Take 1 tablet (200 mg) by mouth once daily. 90 tablet 3    losartan (Cozaar) 50 mg tablet TAKE 1 TABLET BY MOUTH ONCE  DAILY 90 tablet 0    montelukast (Singulair) 10 mg tablet Take 1 tablet (10 mg) by mouth once daily. 90 tablet 1    multivit-minerals/folic acid (ONE-A-DAY WOMEN'S 50 PLUS ORAL) Take 1 tablet by mouth once daily.      pravastatin (Pravachol) 40 mg tablet Take 1 tablet (40 mg) by mouth once daily at bedtime.      sennosides-docusate sodium (Brittny-Colace) 8.6-50 mg tablet Take 1 tablet by mouth once daily.       No current facility-administered medications for this visit.       Assessment and Plan  Kerry \"Kerry Arevalo\" was seen today for hospital follow-up.  Diagnoses and all orders for this visit:  Iron deficiency anemia, unspecified iron deficiency anemia type (Primary)  -     CBC; Future  Hypokalemia  -     Comprehensive metabolic panel; Future  DRESS syndrome  Physical deconditioning  Epidural hematoma (Multi)     Hospital follow up visit  Admitted to the hospital s/p fall and epidural/subdural hematoma  Neurosurgery consulted  No intervention indicated, besides PT and OT  ASA discontinued  She was sent home with home care  She is very weak  She loses balance  She falls  Uses a walker, but very unsteady even with the walker  Appetite is improving slowly  BW showed severe anemia, no blood transfusion given at the hospital, cause unknown  Recheck CBC today  She was hypokalemic and malnourished as well, repeat CMP  Needs to take Boost BID    Follow up with neurosurgery, neurology, podiatry "         Immunizations:  Immunization History   Administered Date(s) Administered    Flu vaccine, quadrivalent, high-dose, preservative free, age 65y+ (FLUZONE) 12/10/2020    Pfizer COVID-19 vaccine, Fall 2023, 12 years and older, (30mcg/0.3mL) 10/02/2023    Pfizer COVID-19 vaccine, bivalent, age 12 years and older (30 mcg/0.3 mL) 10/25/2022    Pfizer Gray Cap SARS-CoV-2 05/02/2022    Pfizer Purple Cap SARS-CoV-2 01/29/2021, 02/26/2021, 04/26/2021, 10/05/2021    Pneumococcal conjugate vaccine, 13-valent (PREVNAR 13) 03/17/2016    Td vaccine, age 7 years and older (TDVAX) 01/01/2003    Tdap vaccine, age 7 year and older (BOOSTRIX, ADACEL) 04/21/2014    Zoster vaccine, recombinant, adult (SHINGRIX) 01/01/2014     Please follow up in 1 month

## 2024-08-13 ENCOUNTER — OFFICE VISIT (OUTPATIENT)
Dept: NEUROSURGERY | Facility: HOSPITAL | Age: 84
End: 2024-08-13
Payer: MEDICARE

## 2024-08-13 VITALS
DIASTOLIC BLOOD PRESSURE: 73 MMHG | HEART RATE: 79 BPM | HEIGHT: 55 IN | BODY MASS INDEX: 23.37 KG/M2 | RESPIRATION RATE: 19 BRPM | WEIGHT: 101 LBS | SYSTOLIC BLOOD PRESSURE: 130 MMHG

## 2024-08-13 DIAGNOSIS — S06.4XAA EPIDURAL HEMATOMA (MULTI): Primary | ICD-10-CM

## 2024-08-13 PROCEDURE — 99213 OFFICE O/P EST LOW 20 MIN: CPT | Performed by: NURSE PRACTITIONER

## 2024-08-13 PROCEDURE — 3078F DIAST BP <80 MM HG: CPT | Performed by: NURSE PRACTITIONER

## 2024-08-13 PROCEDURE — 1036F TOBACCO NON-USER: CPT | Performed by: NURSE PRACTITIONER

## 2024-08-13 PROCEDURE — 3075F SYST BP GE 130 - 139MM HG: CPT | Performed by: NURSE PRACTITIONER

## 2024-08-13 PROCEDURE — 1125F AMNT PAIN NOTED PAIN PRSNT: CPT | Performed by: NURSE PRACTITIONER

## 2024-08-13 PROCEDURE — 1159F MED LIST DOCD IN RCRD: CPT | Performed by: NURSE PRACTITIONER

## 2024-08-13 ASSESSMENT — PAIN SCALES - GENERAL: PAINLEVEL: 7

## 2024-08-13 NOTE — PATIENT INSTRUCTIONS
1) Today we discussed the nature of subdural hematomas and epidural hematomas. We reviewed your recent CT of the head and the implications of this.    2) You may resume your aspirin for cardioprotection, at this time. I would also like to see you back in 2-3 weeks, with a repeat head CT, to reassess your hematoma and overall progress. This may be virtual or in person.     3) In the interim, we discussed signs and symptoms to monitor and to notify our office and present to the ER, should they occur.

## 2024-08-13 NOTE — RESULT ENCOUNTER NOTE
Results reviewed.  There are some minor abnormalities, which are not concerning.  No changes in medications are needed at this time.  Please continue with current treatment.    Best,  Dr. Haynes

## 2024-08-14 ENCOUNTER — HOME CARE VISIT (OUTPATIENT)
Dept: HOME HEALTH SERVICES | Facility: HOME HEALTH | Age: 84
End: 2024-08-14
Payer: MEDICARE

## 2024-08-14 PROCEDURE — G0152 HHCP-SERV OF OT,EA 15 MIN: HCPCS

## 2024-08-14 ASSESSMENT — ENCOUNTER SYMPTOMS
PAIN LOCATION - PAIN QUALITY: THROBBING
PAIN LOCATION: RIGHT FOOT
PAIN LOCATION - PAIN SEVERITY: 5/10
PAIN: 1
SUBJECTIVE PAIN PROGRESSION: UNCHANGED
HIGHEST PAIN SEVERITY IN PAST 24 HOURS: 5/10
LOWEST PAIN SEVERITY IN PAST 24 HOURS: 5/10
PERSON REPORTING PAIN: PATIENT
PAIN LOCATION - PAIN SEVERITY: 5/10
PAIN SEVERITY GOAL: 0/10
PAIN LOCATION: LEFT FOOT
PAIN LOCATION - PAIN QUALITY: THROBBING

## 2024-08-14 ASSESSMENT — ACTIVITIES OF DAILY LIVING (ADL): ENTERING_EXITING_HOME: MINIMUM ASSIST

## 2024-08-16 ENCOUNTER — HOME CARE VISIT (OUTPATIENT)
Dept: HOME HEALTH SERVICES | Facility: HOME HEALTH | Age: 84
End: 2024-08-16
Payer: MEDICARE

## 2024-08-16 PROCEDURE — G0151 HHCP-SERV OF PT,EA 15 MIN: HCPCS

## 2024-08-17 VITALS — RESPIRATION RATE: 14 BRPM | TEMPERATURE: 98.6 F

## 2024-08-17 SDOH — HEALTH STABILITY: PHYSICAL HEALTH: EXERCISE TYPE: GENERAL  STRENGTHENING

## 2024-08-17 ASSESSMENT — ENCOUNTER SYMPTOMS
LIMITED RANGE OF MOTION: 1
PAIN LOCATION: GENERALIZED
HIGHEST PAIN SEVERITY IN PAST 24 HOURS: 5/10
SUBJECTIVE PAIN PROGRESSION: WAXING AND WANING
LOSS OF SENSATION IN FEET: 1
PERSON REPORTING PAIN: PATIENT
PAIN SEVERITY GOAL: 0/10
PAIN LOCATION - PAIN QUALITY: FEET
LOWEST PAIN SEVERITY IN PAST 24 HOURS: 2/10
PAIN LOCATION - PAIN FREQUENCY: FREQUENT
OCCASIONAL FEELINGS OF UNSTEADINESS: 1
DEPRESSION: 0
PAIN LOCATION - PAIN SEVERITY: 4/10
ARTHRALGIAS: 1
PAIN: 1
MUSCLE WEAKNESS: 1

## 2024-08-17 ASSESSMENT — PAIN SCALES - PAIN ASSESSMENT IN ADVANCED DEMENTIA (PAINAD)
FACIALEXPRESSION: 0 - SMILING OR INEXPRESSIVE.
NEGVOCALIZATION: 0
BREATHING: 1
BODYLANGUAGE: 0
CONSOLABILITY: 0
TOTALSCORE: 1
NEGVOCALIZATION: 0 - NONE.
CONSOLABILITY: 0 - NO NEED TO CONSOLE.
FACIALEXPRESSION: 0
BODYLANGUAGE: 0 - RELAXED.

## 2024-08-17 ASSESSMENT — ACTIVITIES OF DAILY LIVING (ADL)
AMBULATION ASSISTANCE: ONE PERSON
AMBULATION ASSISTANCE: STAND BY ASSIST
AMBULATION ASSISTANCE ON FLAT SURFACES: 1

## 2024-08-18 ASSESSMENT — ACTIVITIES OF DAILY LIVING (ADL): OASIS_M1830: 02

## 2024-08-20 ENCOUNTER — HOME CARE VISIT (OUTPATIENT)
Dept: HOME HEALTH SERVICES | Facility: HOME HEALTH | Age: 84
End: 2024-08-20
Payer: MEDICARE

## 2024-08-20 PROCEDURE — 400014 HH F/U

## 2024-08-20 PROCEDURE — G0152 HHCP-SERV OF OT,EA 15 MIN: HCPCS

## 2024-08-20 ASSESSMENT — ACTIVITIES OF DAILY LIVING (ADL)
CURRENT_FUNCTION: CONTACT GUARD ASSIST
PHYSICAL TRANSFERS ASSESSED: 1
AMBULATION ASSISTANCE: CONTACT GUARD ASSIST
AMBULATION ASSISTANCE: 1

## 2024-08-20 ASSESSMENT — ENCOUNTER SYMPTOMS
HIGHEST PAIN SEVERITY IN PAST 24 HOURS: 6/10
PAIN LOCATION - PAIN FREQUENCY: CONSTANT
LOWEST PAIN SEVERITY IN PAST 24 HOURS: 4/10
PERSON REPORTING PAIN: PATIENT
PAIN LOCATION - PAIN SEVERITY: 4/10
PAIN LOCATION: RIGHT FOOT
PAIN LOCATION - PAIN QUALITY: ACHE
PAIN LOCATION: LEFT FOOT
SUBJECTIVE PAIN PROGRESSION: WAXING AND WANING
PAIN LOCATION - PAIN QUALITY: ACHE
PAIN LOCATION - PAIN SEVERITY: 4/10
PAIN LOCATION - PAIN FREQUENCY: CONSTANT
PAIN: 1

## 2024-08-21 ENCOUNTER — HOME CARE VISIT (OUTPATIENT)
Dept: HOME HEALTH SERVICES | Facility: HOME HEALTH | Age: 84
End: 2024-08-21
Payer: MEDICARE

## 2024-08-21 DIAGNOSIS — I10 HYPERTENSION, UNSPECIFIED TYPE: ICD-10-CM

## 2024-08-21 PROCEDURE — G0153 HHCP-SVS OF S/L PATH,EA 15MN: HCPCS

## 2024-08-21 ASSESSMENT — PAIN SCALES - PAIN ASSESSMENT IN ADVANCED DEMENTIA (PAINAD)
BODYLANGUAGE: 0
BREATHING: 0
NEGVOCALIZATION: 0 - NONE.
TOTALSCORE: 0
NEGVOCALIZATION: 0
CONSOLABILITY: 0
CONSOLABILITY: 0 - NO NEED TO CONSOLE.
BODYLANGUAGE: 0 - RELAXED.
FACIALEXPRESSION: 0 - SMILING OR INEXPRESSIVE.
FACIALEXPRESSION: 0

## 2024-08-21 ASSESSMENT — ENCOUNTER SYMPTOMS
AGGRESSION WITHIN DEFINED LIMITS: 1
PERSON REPORTING PAIN: PATIENT
DENIES PAIN: 1
SUBJECTIVE PAIN PROGRESSION: UNCHANGED
ANGER WITHIN DEFINED LIMITS: 1

## 2024-08-22 RX ORDER — MONTELUKAST SODIUM 10 MG/1
10 TABLET ORAL DAILY
Qty: 90 TABLET | Refills: 3 | Status: SHIPPED | OUTPATIENT
Start: 2024-08-22

## 2024-08-23 ENCOUNTER — HOME CARE VISIT (OUTPATIENT)
Dept: HOME HEALTH SERVICES | Facility: HOME HEALTH | Age: 84
End: 2024-08-23
Payer: MEDICARE

## 2024-08-23 ENCOUNTER — TELEPHONE (OUTPATIENT)
Dept: PRIMARY CARE | Facility: CLINIC | Age: 84
End: 2024-08-23
Payer: MEDICARE

## 2024-08-23 VITALS — HEART RATE: 90 BPM | RESPIRATION RATE: 14 BRPM | OXYGEN SATURATION: 99 % | TEMPERATURE: 98.6 F

## 2024-08-23 DIAGNOSIS — M48.061 SPINAL STENOSIS OF LUMBAR REGION, UNSPECIFIED WHETHER NEUROGENIC CLAUDICATION PRESENT: Primary | ICD-10-CM

## 2024-08-23 PROCEDURE — G0151 HHCP-SERV OF PT,EA 15 MIN: HCPCS

## 2024-08-23 ASSESSMENT — PAIN SCALES - PAIN ASSESSMENT IN ADVANCED DEMENTIA (PAINAD)
BREATHING: 1
CONSOLABILITY: 1 - DISTRACTED OR REASSURED BY VOICE OR TOUCH.
TOTALSCORE: 6
BODYLANGUAGE: 1 - TENSE. DISTRESSED PACING. FIDGETING.
FACIALEXPRESSION: 2
NEGVOCALIZATION: 1 - OCCASIONAL MOAN OR GROAN. LOW-LEVEL SPEECH WITH A NEGATIVE OR DISAPPROVING QUALITY.
BODYLANGUAGE: 1
NEGVOCALIZATION: 1
FACIALEXPRESSION: 2 - FACIAL GRIMACING.
CONSOLABILITY: 1

## 2024-08-23 ASSESSMENT — ENCOUNTER SYMPTOMS
SUBJECTIVE PAIN PROGRESSION: GRADUALLY IMPROVING
HIGHEST PAIN SEVERITY IN PAST 24 HOURS: 4/10
PAIN LOCATION - PAIN SEVERITY: 3/10
PAIN: 1
PERSON REPORTING PAIN: PATIENT
PAIN SEVERITY GOAL: 0/10
PAIN LOCATION - PAIN QUALITY: SORE
LOWEST PAIN SEVERITY IN PAST 24 HOURS: 1/10
PAIN LOCATION: GENERALIZED

## 2024-08-23 ASSESSMENT — ACTIVITIES OF DAILY LIVING (ADL): OASIS_M1830: 03

## 2024-08-23 NOTE — TELEPHONE ENCOUNTER
Homecare is calling pt's needs have changed.   She is needing a order for rollator needs to say with locking wheels and a seat.  to be faxed to [place] # [phone]

## 2024-08-24 SDOH — HEALTH STABILITY: PHYSICAL HEALTH: EXERCISE TYPE: STRENGTH AND BALANCE

## 2024-08-24 ASSESSMENT — ENCOUNTER SYMPTOMS
MUSCLE WEAKNESS: 1
OCCASIONAL FEELINGS OF UNSTEADINESS: 1
DEPRESSION: 0
LOSS OF SENSATION IN FEET: 1

## 2024-08-24 ASSESSMENT — ACTIVITIES OF DAILY LIVING (ADL)
AMBULATION ASSISTANCE: STAND BY ASSIST
AMBULATION ASSISTANCE ON FLAT SURFACES: 1

## 2024-08-24 NOTE — CASE COMMUNICATION
Need order for rollzandra walker  sent to [facility]  along with certificate of need(medicare requirement) to fax number [phone]

## 2024-08-27 ENCOUNTER — HOME CARE VISIT (OUTPATIENT)
Dept: HOME HEALTH SERVICES | Facility: HOME HEALTH | Age: 84
End: 2024-08-27
Payer: MEDICARE

## 2024-08-27 PROCEDURE — G0152 HHCP-SERV OF OT,EA 15 MIN: HCPCS

## 2024-08-27 ASSESSMENT — ENCOUNTER SYMPTOMS
DENIES PAIN: 1
PERSON REPORTING PAIN: PATIENT

## 2024-08-27 ASSESSMENT — ACTIVITIES OF DAILY LIVING (ADL): BATHING ASSESSED: 1

## 2024-08-28 ENCOUNTER — HOSPITAL ENCOUNTER (OUTPATIENT)
Dept: RADIOLOGY | Facility: HOSPITAL | Age: 84
Discharge: HOME | End: 2024-08-28
Payer: MEDICARE

## 2024-08-28 DIAGNOSIS — S06.4XAA EPIDURAL HEMATOMA (MULTI): ICD-10-CM

## 2024-08-28 PROCEDURE — 70450 CT HEAD/BRAIN W/O DYE: CPT

## 2024-08-28 PROCEDURE — 70450 CT HEAD/BRAIN W/O DYE: CPT | Performed by: RADIOLOGY

## 2024-08-29 ENCOUNTER — OFFICE VISIT (OUTPATIENT)
Dept: DERMATOLOGY | Facility: CLINIC | Age: 84
End: 2024-08-29
Payer: MEDICARE

## 2024-08-29 ENCOUNTER — HOME CARE VISIT (OUTPATIENT)
Dept: HOME HEALTH SERVICES | Facility: HOME HEALTH | Age: 84
End: 2024-08-29
Payer: MEDICARE

## 2024-08-29 DIAGNOSIS — T50.905A DRUG REACTION WITH EOSINOPHILIA AND SYSTEMIC SYMPTOMS: Primary | ICD-10-CM

## 2024-08-29 DIAGNOSIS — D72.10 DRUG REACTION WITH EOSINOPHILIA AND SYSTEMIC SYMPTOMS: Primary | ICD-10-CM

## 2024-08-29 PROCEDURE — 99214 OFFICE O/P EST MOD 30 MIN: CPT | Performed by: STUDENT IN AN ORGANIZED HEALTH CARE EDUCATION/TRAINING PROGRAM

## 2024-08-29 PROCEDURE — G0151 HHCP-SERV OF PT,EA 15 MIN: HCPCS

## 2024-08-29 PROCEDURE — 1159F MED LIST DOCD IN RCRD: CPT | Performed by: STUDENT IN AN ORGANIZED HEALTH CARE EDUCATION/TRAINING PROGRAM

## 2024-08-29 PROCEDURE — G2211 COMPLEX E/M VISIT ADD ON: HCPCS | Performed by: STUDENT IN AN ORGANIZED HEALTH CARE EDUCATION/TRAINING PROGRAM

## 2024-08-29 RX ORDER — PREDNISONE 10 MG/1
TABLET ORAL
Qty: 98 TABLET | Refills: 0 | Status: SHIPPED | OUTPATIENT
Start: 2024-08-29 | End: 2024-09-26

## 2024-08-29 ASSESSMENT — DERMATOLOGY PATIENT ASSESSMENT
DO YOU USE SUNSCREEN: OCCASIONALLY
ARE YOU AN ORGAN TRANSPLANT RECIPIENT: NO
DO YOU HAVE IRREGULAR MENSTRUAL CYCLES: NO
HAVE YOU HAD OR DO YOU HAVE A STAPH INFECTION: NO
HAVE YOU HAD OR DO YOU HAVE VASCULAR DISEASE: NO
ARE YOU TRYING TO GET PREGNANT: NO
DO YOU USE A TANNING BED: NO
ARE YOU ON BIRTH CONTROL: NO
DO YOU HAVE ANY NEW OR CHANGING LESIONS: YES

## 2024-08-29 ASSESSMENT — DERMATOLOGY QUALITY OF LIFE (QOL) ASSESSMENT
ARE THERE EXCLUSIONS OR EXCEPTIONS FOR THE QUALITY OF LIFE ASSESSMENT: NO
DATE THE QUALITY-OF-LIFE ASSESSMENT WAS COMPLETED: 67081
RATE HOW EMOTIONALLY BOTHERED YOU ARE BY YOUR SKIN PROBLEM (FOR EXAMPLE, WORRY, EMBARRASSMENT, FRUSTRATION): 6 - ALWAYS BOTHERED
RATE HOW BOTHERED YOU ARE BY EFFECTS OF YOUR SKIN PROBLEMS ON YOUR ACTIVITIES (EG, GOING OUT, ACCOMPLISHING WHAT YOU WANT, WORK ACTIVITIES OR YOUR RELATIONSHIPS WITH OTHERS): 6 - ALWAYS BOTHERED
RATE HOW BOTHERED YOU ARE BY SYMPTOMS OF YOUR SKIN PROBLEM (EG, ITCHING, STINGING BURNING, HURTING OR SKIN IRRITATION): 6 - ALWAYS BOTHERED

## 2024-08-29 ASSESSMENT — PATIENT GLOBAL ASSESSMENT (PGA): PATIENT GLOBAL ASSESSMENT: PATIENT GLOBAL ASSESSMENT:  2 - MILD

## 2024-08-29 ASSESSMENT — ITCH NUMERIC RATING SCALE: HOW SEVERE IS YOUR ITCHING?: 5

## 2024-08-29 NOTE — PATIENT INSTRUCTIONS
You are having a flare up of your DRESS (drug reaction with eosinophilia and systemic symptoms) due to your sulfa  medication.   The good news is your liver function tests and blood counts have normalized but we need to get you on treatment to improve your skin!  We will restart prednisone 40 mg x 2 weeks then decrease to 30 mg x 2 weeks.  Start using vaseline to skin multiple times daily to help with dry skin  I will see you back in 4 weeks to assess how your skin is responding.

## 2024-08-29 NOTE — PROGRESS NOTES
Subjective     Kerry Arevalo is a 84 y.o. female who presents for the following: Rash (Dry skin patches that itch, burn and peel).     Review of Systems:  No other skin or systemic complaints other than what is documented elsewhere in the note.    The following portions of the chart were reviewed this encounter and updated as appropriate:          Skin Cancer History  No skin cancer on file.      Specialty Problems    None       Objective   Well appearing patient in no apparent distress; mood and affect are within normal limits.    A focused skin examination was performed. All findings within normal limits unless otherwise noted below.    Assessment/Plan   1. 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   Discharged on steroids and triamcinolone, was doing well and stopped prednisone in July, now with severe erythrodermic flare  Labs normalized last month  Discussed that DRESS syndrome often requires slow long taper of topical steroids  Will restart prednisone 40 mg, patient to let us know if this is not effective for managing symptoms  Will decrease to 30 mg in 2 weeks, will see back in 4 weeks  Does not find triamcinolone to be effective, discussed using vaseline multiple times daily given severe flaking  Follow in in 4 months for continued management of this complex chronic condition      predniSONE (Deltasone) 10 mg tablet  Take 4 tablets (40 mg) by mouth once daily in the morning for 14 days, THEN 3 tablets (30 mg) once daily in the morning for 14 days.    Related Procedures  Follow Up In Dermatology - Established Patient

## 2024-08-30 VITALS — OXYGEN SATURATION: 98 % | RESPIRATION RATE: 14 BRPM | HEART RATE: 70 BPM | TEMPERATURE: 98.6 F

## 2024-08-30 SDOH — HEALTH STABILITY: PHYSICAL HEALTH: EXERCISE TYPE: STRENGTHENING AND BALANCE TRAINING

## 2024-08-30 SDOH — ECONOMIC STABILITY: HOUSING INSECURITY: HOME SAFETY: LACKS APPROPRIATE ASSISTIVE DEVICE

## 2024-08-30 ASSESSMENT — ENCOUNTER SYMPTOMS
PAIN SEVERITY GOAL: 0/10
LOSS OF SENSATION IN FEET: 1
SUBJECTIVE PAIN PROGRESSION: GRADUALLY IMPROVING
PAIN LOCATION - PAIN QUALITY: SORE
OCCASIONAL FEELINGS OF UNSTEADINESS: 1
PAIN LOCATION - PAIN SEVERITY: 3/10
PAIN: 1
PAIN LOCATION: GENERALIZED
PERSON REPORTING PAIN: PATIENT
MUSCLE WEAKNESS: 1
DEPRESSION: 1
HIGHEST PAIN SEVERITY IN PAST 24 HOURS: 5/10
LOWEST PAIN SEVERITY IN PAST 24 HOURS: 2/10

## 2024-08-30 ASSESSMENT — PAIN SCALES - PAIN ASSESSMENT IN ADVANCED DEMENTIA (PAINAD)
BODYLANGUAGE: 0 - RELAXED.
BREATHING: 1
CONSOLABILITY: 0 - NO NEED TO CONSOLE.
NEGVOCALIZATION: 0
FACIALEXPRESSION: 0 - SMILING OR INEXPRESSIVE.
FACIALEXPRESSION: 0
CONSOLABILITY: 0
TOTALSCORE: 1
BODYLANGUAGE: 0
NEGVOCALIZATION: 0 - NONE.

## 2024-09-02 NOTE — PROGRESS NOTES
[facility]   Neurosurgery    Diagnosis  Diagnoses and all orders for this visit:  Epidural hematoma (Multi)      Patient Discussion/Summary  1) Today we reviewed your recent CT of the head, which showed continued improvement in your head bleed.      2) Regarding your occasional right hand numbness, affecting the thumb and index finger, we discussed that these symptoms sound consistent with carpal tunnel syndrome, and may be further evaluated by your PCP.     3) No further neurosurgical follow up is indicated, at this time.      3) In the interim, we discussed signs and symptoms to monitor and to present to the ER, should they occur.      Provider Impressions  84 y.o. female with findings of a R temporal EDH on 7/20/24, following a mechanical fall backwards while standing at her fridge and felt unsteady, +head strike and -LOC. Today she continues to do well and is asymptomatic, regarding her head bleed. She continues to work with PT for her history of imbalance and frequent falls, and is in the process of getting a wheeled walker. She does endorse occas right handed numbness, affecting the thumb and index finger, which is positional. We discussed that this sounds consistent with carpal tunnel, and may be further evaluated by her PCP. The most recent head CT was reviewed today and showed interval evolution and decrease in R EDH. No further neurosurgical evaluation is required, at this time. In the interim, we discussed red flag symptoms to monitor and present to the ED, should they occur.       History of Present Illness  Chief Complaint: No chief complaint on file.    Virtual or Telephone Consent    A telephone visit (audio only) between the patient (at the originating site) and the provider (at the distant site) was utilized to provide this telehealth service.   Verbal consent was requested and obtained from Kerry MAREK Mickey on this date, 09/03/24 for a telehealth visit.        HPI: Kerry JARA Mickey is a 84  y.o. female with PMH of HTN, DM II, OA/RA, spinal stenosis with lumbar radiculopathy, and recent DRESS (hospitalized 6/2024, in setting of sulfa drug), who presented to [facility] on 7/20/24 following a mechanical fall backwards while standing at her fridge and felt unsteady, +head strike and -LOC; CTH showed small R temporal EDH. During her hospital follow up on 8/13, patient was doing overall well and was asymptomatic regarding her head bleed. Patient did endorse a history of frequent falls, due to imbalance, which she was in PT. She had not yet resumed her aspirin, which she was taking for primary prophylaxis, per her PCP. We had reviewed her most recent head CT, which showed interval evolution and decrease in R EDH. As such, she was advised that she may resume her aspirin, and to follow up in 2-3 weeks, with a repeat head CT, to reassess her head bleed and overall progress. Patient presents virtually today to discuss her CT results and progress.    Patient reports to be doing overall well since her last visit. She continues to work with PT for her baseline unsteadiness, and is in the process of getting a wheeled walker. She notes a one-time incidence of a headache, which was not severe and resolved on its own. She does note occas numbness in the right hand, affecting the thumb and index finger, which is positional. She was also evaluated by ophtho and was provided eye drops for comfort, but was otherwise told her exam was unremarkable.        ROS: As noted in HPI.      Previous History  Past Medical History:   Diagnosis Date    Body mass index (BMI) 25.0-25.9, adult 12/18/2019    Body mass index (BMI) of 25.0 to 25.9 in adult    Body mass index (BMI) 27.0-27.9, adult 04/26/2022    Body mass index (BMI) of 27.0 to 27.9 in adult    Body mass index (BMI) 28.0-28.9, adult 06/14/2022    Body mass index (BMI) of 28.0 to 28.9 in adult    Body mass index (BMI) 29.0-29.9, adult 08/16/2021    Body mass index (BMI) of 29.0  to 29.9 in adult    Body mass index (BMI)30.0-30.9, adult 01/27/2021    Body mass index (BMI) of 30.0 to 30.9 in adult    Disappearance and death of family member 10/31/2013    Bereavement, uncomplicated    Encounter for screening mammogram for malignant neoplasm of breast 06/27/2016    Encounter for screening mammogram for breast cancer    Macular cyst, hole, or pseudohole, right eye     Macular hole, right    Menopausal and female climacteric states 11/04/2015    Menopausal symptoms    Other postherpetic nervous system involvement 07/24/2017    Postherpetic neuralgia    Other specified disorders of left ear 12/27/2016    Fullness in ear, left    Personal history of diseases of the blood and blood-forming organs and certain disorders involving the immune mechanism 02/19/2013    History of anemia    Personal history of diseases of the skin and subcutaneous tissue 05/12/2013    History of hair or hair follicle disorder    Personal history of other diseases of the musculoskeletal system and connective tissue     History of arthritis    Personal history of other diseases of the nervous system and sense organs     History of cataract    Personal history of other diseases of the respiratory system 03/04/2019    History of acute bronchitis    Personal history of other infectious and parasitic diseases 03/09/2017    History of herpes zoster    Personal history of other specified conditions 04/11/2019    History of weight loss    Unspecified asthma, uncomplicated ([facility]) 12/19/2022    Asthma     Past Surgical History:   Procedure Laterality Date    CATARACT EXTRACTION  10/14/2014    Cataract Surgery    COLONOSCOPY  02/19/2013    Complete Colonoscopy    HERNIA REPAIR  09/26/2016    Hernia Repair    OTHER SURGICAL HISTORY  05/12/2013    Repair Of Retinal Detachment With Macular Detachment    TOTAL ABDOMINAL HYSTERECTOMY W/ BILATERAL SALPINGOOPHORECTOMY  02/19/2013    Total Abdominal Hysterectomy With Removal Of Both Ovaries      Social History     Tobacco Use    Smoking status: Never    Smokeless tobacco: Never   Substance Use Topics    Alcohol use: Not Currently    Drug use: Never     Family History   Problem Relation Name Age of Onset    Heart failure Mother      Osteoporosis Mother      Other (cerebral hemorrhage) Father      Alzheimer's disease Sister      Heart attack Brother      Breast cancer Other       Allergies   Allergen Reactions    Sulfasalazine Dermatitis and Other     DRESS syndrome    Duloxetine Other     Weight loss    Topiramate Other     Appetite and  weight loss     Current Outpatient Medications   Medication Instructions    azelastine (Astelin) 137 mcg (0.1 %) nasal spray 2 sprays, Each Nostril, 2 times daily    calcium citrate (CALCITRATE) 200 mg, oral, Daily    losartan (COZAAR) 50 mg, oral, Daily    montelukast (SINGULAIR) 10 mg, oral, Daily    multivit-minerals/folic acid (ONE-A-DAY WOMEN'S 50 PLUS ORAL) 1 tablet, oral, Daily    pravastatin (PRAVACHOL) 40 mg, oral, Nightly    predniSONE (Deltasone) 10 mg tablet Take 4 tablets (40 mg) by mouth once daily in the morning for 14 days, THEN 3 tablets (30 mg) once daily in the morning for 14 days.    sennosides-docusate sodium (Brittny-Colace) 8.6-50 mg tablet 1 tablet, oral, Daily RT       Physical Exam  Cognition and speech intact.     Results  I personally reviewed the most recent head CT from 8/28/24 demonstrating interval evolution and improvement in R temporal EDH, now measuring ~4mm (previously ~8mm), without significant mass affect or MLS, when compared to prior imaging.

## 2024-09-03 ENCOUNTER — TELEMEDICINE (OUTPATIENT)
Dept: NEUROSURGERY | Facility: HOSPITAL | Age: 84
End: 2024-09-03
Payer: MEDICARE

## 2024-09-03 DIAGNOSIS — S06.4XAA EPIDURAL HEMATOMA (MULTI): Primary | ICD-10-CM

## 2024-09-03 PROCEDURE — 99442 PR PHYS/QHP TELEPHONE EVALUATION 11-20 MIN: CPT | Performed by: NURSE PRACTITIONER

## 2024-09-04 ENCOUNTER — APPOINTMENT (OUTPATIENT)
Dept: DERMATOLOGY | Facility: CLINIC | Age: 84
End: 2024-09-04
Payer: MEDICARE

## 2024-09-04 ENCOUNTER — HOME CARE VISIT (OUTPATIENT)
Dept: HOME HEALTH SERVICES | Facility: HOME HEALTH | Age: 84
End: 2024-09-04
Payer: MEDICARE

## 2024-09-04 VITALS — TEMPERATURE: 98.6 F | RESPIRATION RATE: 16 BRPM | OXYGEN SATURATION: 98 % | HEART RATE: 68 BPM

## 2024-09-04 PROCEDURE — G0151 HHCP-SERV OF PT,EA 15 MIN: HCPCS

## 2024-09-04 SDOH — HEALTH STABILITY: PHYSICAL HEALTH: EXERCISE TYPE: STRENGTH,BALANCE

## 2024-09-04 ASSESSMENT — ENCOUNTER SYMPTOMS
PAIN LOCATION - PAIN FREQUENCY: FREQUENT
PAIN LOCATION - PAIN QUALITY: FEET
SUBJECTIVE PAIN PROGRESSION: GRADUALLY IMPROVING
ARTHRALGIAS: 1
MUSCLE WEAKNESS: 1
OCCASIONAL FEELINGS OF UNSTEADINESS: 1
HIGHEST PAIN SEVERITY IN PAST 24 HOURS: 4/10
PAIN: 1
PERSON REPORTING PAIN: PATIENT
PAIN LOCATION - PAIN SEVERITY: 3/10

## 2024-09-04 ASSESSMENT — ACTIVITIES OF DAILY LIVING (ADL)
AMBULATION ASSISTANCE: STAND BY ASSIST
AMBULATION ASSISTANCE ON FLAT SURFACES: 1

## 2024-09-04 ASSESSMENT — PAIN SCALES - PAIN ASSESSMENT IN ADVANCED DEMENTIA (PAINAD)
FACIALEXPRESSION: 2 - FACIAL GRIMACING.
BODYLANGUAGE: 0 - RELAXED.
CONSOLABILITY: 0
NEGVOCALIZATION: 1
BODYLANGUAGE: 0
FACIALEXPRESSION: 2
CONSOLABILITY: 0 - NO NEED TO CONSOLE.
BREATHING: 1
NEGVOCALIZATION: 1 - OCCASIONAL MOAN OR GROAN. LOW-LEVEL SPEECH WITH A NEGATIVE OR DISAPPROVING QUALITY.
TOTALSCORE: 4

## 2024-09-06 ENCOUNTER — HOME CARE VISIT (OUTPATIENT)
Dept: HOME HEALTH SERVICES | Facility: HOME HEALTH | Age: 84
End: 2024-09-06
Payer: MEDICARE

## 2024-09-06 PROCEDURE — G0152 HHCP-SERV OF OT,EA 15 MIN: HCPCS

## 2024-09-07 ASSESSMENT — ENCOUNTER SYMPTOMS
LOWEST PAIN SEVERITY IN PAST 24 HOURS: 0/10
PAIN LOCATION - PAIN SEVERITY: 3/10
PAIN LOCATION: LEFT FOOT
PAIN SEVERITY GOAL: 0/10
PAIN LOCATION: RIGHT FOOT
PAIN LOCATION - PAIN FREQUENCY: INTERMITTENT
PAIN LOCATION - PAIN FREQUENCY: INTERMITTENT
HIGHEST PAIN SEVERITY IN PAST 24 HOURS: 5/10
PAIN LOCATION - PAIN QUALITY: ACHE
PERSON REPORTING PAIN: PATIENT
PAIN: 1
PAIN LOCATION - PAIN SEVERITY: 3/10
PAIN LOCATION - PAIN QUALITY: ACHE
SUBJECTIVE PAIN PROGRESSION: WAXING AND WANING

## 2024-09-07 ASSESSMENT — ACTIVITIES OF DAILY LIVING (ADL): PREPARING MEALS: NEEDS ASSISTANCE

## 2024-09-10 ENCOUNTER — HOME CARE VISIT (OUTPATIENT)
Dept: HOME HEALTH SERVICES | Facility: HOME HEALTH | Age: 84
End: 2024-09-10
Payer: MEDICARE

## 2024-09-10 VITALS — RESPIRATION RATE: 14 BRPM | TEMPERATURE: 98.6 F

## 2024-09-10 PROCEDURE — G0151 HHCP-SERV OF PT,EA 15 MIN: HCPCS

## 2024-09-10 PROCEDURE — G0152 HHCP-SERV OF OT,EA 15 MIN: HCPCS

## 2024-09-10 SDOH — HEALTH STABILITY: PHYSICAL HEALTH: EXERCISE TYPE: GENERAL STRENGTHENING MAJOR MUSCLE GROUPS

## 2024-09-10 ASSESSMENT — ENCOUNTER SYMPTOMS
PAIN LOCATION - PAIN QUALITY: BURNING
OCCASIONAL FEELINGS OF UNSTEADINESS: 1
PAIN: 1
PAIN LOCATION: GENERALIZED
HIGHEST PAIN SEVERITY IN PAST 24 HOURS: 5/10
PERSON REPORTING PAIN: PATIENT
PAIN SEVERITY GOAL: 0/10
MUSCLE WEAKNESS: 1
ARTHRALGIAS: 1
LOWEST PAIN SEVERITY IN PAST 24 HOURS: 3/10
SUBJECTIVE PAIN PROGRESSION: GRADUALLY IMPROVING
PAIN LOCATION - PAIN SEVERITY: 3/10

## 2024-09-10 ASSESSMENT — PAIN SCALES - PAIN ASSESSMENT IN ADVANCED DEMENTIA (PAINAD)
CONSOLABILITY: 0 - NO NEED TO CONSOLE.
BODYLANGUAGE: 0 - RELAXED.
BODYLANGUAGE: 0
BREATHING: 0
NEGVOCALIZATION: 0
CONSOLABILITY: 0
FACIALEXPRESSION: 2
NEGVOCALIZATION: 0 - NONE.
TOTALSCORE: 2
FACIALEXPRESSION: 2 - FACIAL GRIMACING.

## 2024-09-10 ASSESSMENT — ACTIVITIES OF DAILY LIVING (ADL)
AMBULATION ASSISTANCE: STAND BY ASSIST
AMBULATION ASSISTANCE ON FLAT SURFACES: 1

## 2024-09-11 ASSESSMENT — ACTIVITIES OF DAILY LIVING (ADL)
GROOMING ASSESSED: 1
ORAL_CARE_CURRENT_FUNCTION: INDEPENDENT
DRESSING_LB_CURRENT_FUNCTION: INDEPENDENT
HOUSEKEEPING ASSESSED: 1
TOILETING: 1
SHOPPING ASSESSED: 1
BATHING ASSESSED: 1
CURRENT_FUNCTION: INDEPENDENT
LAUNDRY: DEPENDENT
LAUNDRY ASSESSED: 1
BATHING_CURRENT_FUNCTION: INDEPENDENT
SHOPPING: DEPENDENT
FEEDING: INDEPENDENT
FEEDING ASSESSED: 1
GROOMING_CURRENT_FUNCTION: INDEPENDENT
LIGHT HOUSEKEEPING: DEPENDENT
TRANSPORTATION ASSESSED: 1
ORAL_CARE_ASSESSED: 1
TOILETING: INDEPENDENT
USING THE TELPHONE: INDEPENDENT
TRANSPORTATION: DEPENDENT
PREPARING MEALS: NEEDS ASSISTANCE
TELEPHONE USE ASSESSED: 1
DRESSING_UB_CURRENT_FUNCTION: INDEPENDENT
PHYSICAL TRANSFERS ASSESSED: 1

## 2024-09-17 ENCOUNTER — OFFICE VISIT (OUTPATIENT)
Dept: RHEUMATOLOGY | Facility: CLINIC | Age: 84
End: 2024-09-17
Payer: MEDICARE

## 2024-09-17 VITALS
BODY MASS INDEX: 23.47 KG/M2 | HEIGHT: 55 IN | OXYGEN SATURATION: 93 % | HEART RATE: 76 BPM | DIASTOLIC BLOOD PRESSURE: 72 MMHG | TEMPERATURE: 98 F | SYSTOLIC BLOOD PRESSURE: 174 MMHG

## 2024-09-17 DIAGNOSIS — T50.905A DRESS SYNDROME: ICD-10-CM

## 2024-09-17 DIAGNOSIS — M19.90 OSTEOARTHRITIS, UNSPECIFIED OSTEOARTHRITIS TYPE, UNSPECIFIED SITE: Primary | ICD-10-CM

## 2024-09-17 DIAGNOSIS — M06.9 RHEUMATOID ARTHRITIS: ICD-10-CM

## 2024-09-17 DIAGNOSIS — M06.9 RHEUMATOID ARTHRITIS, INVOLVING UNSPECIFIED SITE, UNSPECIFIED WHETHER RHEUMATOID FACTOR PRESENT (MULTI): ICD-10-CM

## 2024-09-17 DIAGNOSIS — D72.12 DRESS SYNDROME: ICD-10-CM

## 2024-09-17 DIAGNOSIS — M81.0 AGE RELATED OSTEOPOROSIS, UNSPECIFIED PATHOLOGICAL FRACTURE PRESENCE: ICD-10-CM

## 2024-09-17 RX ORDER — DICLOFENAC SODIUM 10 MG/G
4 GEL TOPICAL 4 TIMES DAILY PRN
Qty: 200 G | Refills: 3 | Status: SHIPPED | OUTPATIENT
Start: 2024-09-17

## 2024-09-17 RX ORDER — ERYTHROMYCIN 5 MG/G
OINTMENT OPHTHALMIC
COMMUNITY
Start: 2024-09-07

## 2024-09-17 RX ORDER — METHYLPREDNISOLONE 4 MG/1
TABLET ORAL
COMMUNITY
Start: 2024-05-31

## 2024-09-17 NOTE — PROGRESS NOTES
83 y/o woman who presents for follow-up of osteoarthritis, rheumatoid arthritis, osteopenia.    Chief Complaint:  osteoarthritis, rheumatoid arthritis, osteopenia    HPI:  Having discomfort in her hands for 3-4 months, also noticing DIP deformities 7/2024 was admitted due to traumatic ICH - epidural hematoma s/p fall. No surgical intervention recommended by neurosurgery. Had to stop HCQ due to vision changes. Patient had to stop sulfasalazine as she developed DRESS syndrome, proven on punch biopsy. Placed on prednisone taper. Following with derm.    She complains of discomfort in the heels and in the base of her toes particularly with weightbearing and when lying in bed. The lower back pain is not severe if she does not stand for prolonged periods of time.  She ambulates using a cane because of unsteady gait. Currently taking prednisone for back pain.    Objective:  Vitals:    09/17/24 1344   BP: 174/72   Pulse: 76   Temp: 36.7 °C (98 °F)   SpO2: 93%      Examination shows mild bony prominence of the PIP joint of the digits of both hands.  There is slight prominence of the left wrist.  There is bony prominence of the mid dorsal aspect of the left foot.  There is slight tenderness at the plantar aspect of the MTP joint of the digits of both feet.  There is no tenderness to palpation over the heels.  Straight leg raise test is normal bilaterally in the seated position.    Lab Results   Component Value Date    WBC 5.5 08/12/2024    HGB 9.0 (L) 08/12/2024    HCT 29.3 (L) 08/12/2024     (H) 08/12/2024     08/12/2024        Chemistry    Lab Results   Component Value Date/Time     08/12/2024 1058    K 4.0 08/12/2024 1058     08/12/2024 1058    CO2 23 08/12/2024 1058    BUN 13 08/12/2024 1058    CREATININE 0.50 08/12/2024 1058    Lab Results   Component Value Date/Time    CALCIUM 9.1 08/12/2024 1058    ALKPHOS 69 08/12/2024 1058    AST 18 08/12/2024 1058    ALT 10 08/12/2024 1058    BILITOT 0.4  "08/12/2024 1058           Lab Results   Component Value Date    CRP 2.07 (H) 07/20/2024      No results found for: \"SEDRATE\"   No results found for: \"HEPATOT\", \"HEPAIGM\", \"HEPBCIGM\", \"HEPBCAB\", \"HBEAG\", \"HEPCAB\"   Lab Results   Component Value Date    ALT 10 08/12/2024    AST 18 08/12/2024    ALKPHOS 69 08/12/2024    BILITOT 0.4 08/12/2024      No results found for: \"ALBUR\", \"[id number]\"      DEXA bone density T-score (2/20/2023)... (8/7/2020)  Left femoral neck....................... -1.6................... -0.3  Left total femur.............................-1.3.................. -1.0  L1-L4................................................. -0.2................. -0.4    Assessment/Plan:  85 y/o woman who presents for follow-up of osteoarthritis, rheumatoid arthritis, osteopenia.    Patient had to stop sulfasalazine as she developed DRESS syndrome, proven on punch biopsy. Placed on prednisone taper. Following with derm.    She is to continue Celebrex 200 mg once per day as needed for pain. Continue prednisone taper from derm. Anticipate long term prednisone use 5 mg 3 days/week.  Other DMARDs risks outweigh benefits. Start topical Voltaren gel PRN.    She is to return at the next available office appointment.    Patient seen and discussed with Dr. Goncalves.    Ladonna Peña MD  Rheumatology Fellow, PGY-4  "

## 2024-09-18 ENCOUNTER — HOME CARE VISIT (OUTPATIENT)
Dept: HOME HEALTH SERVICES | Facility: HOME HEALTH | Age: 84
End: 2024-09-18
Payer: MEDICARE

## 2024-09-18 VITALS — TEMPERATURE: 98.6 F | OXYGEN SATURATION: 98 % | HEART RATE: 66 BPM | RESPIRATION RATE: 14 BRPM

## 2024-09-18 PROCEDURE — G0151 HHCP-SERV OF PT,EA 15 MIN: HCPCS

## 2024-09-18 SDOH — HEALTH STABILITY: PHYSICAL HEALTH: EXERCISE TYPE: STRENGTH AND BALANCE TRAINING

## 2024-09-18 ASSESSMENT — PAIN SCALES - PAIN ASSESSMENT IN ADVANCED DEMENTIA (PAINAD)
BODYLANGUAGE: 0
FACIALEXPRESSION: 0
CONSOLABILITY: 0 - NO NEED TO CONSOLE.
NEGVOCALIZATION: 1 - OCCASIONAL MOAN OR GROAN. LOW-LEVEL SPEECH WITH A NEGATIVE OR DISAPPROVING QUALITY.
FACIALEXPRESSION: 0 - SMILING OR INEXPRESSIVE.
TOTALSCORE: 2
BREATHING: 1
NEGVOCALIZATION: 1
BODYLANGUAGE: 0 - RELAXED.
CONSOLABILITY: 0

## 2024-09-18 ASSESSMENT — ENCOUNTER SYMPTOMS
OCCASIONAL FEELINGS OF UNSTEADINESS: 1
MUSCLE WEAKNESS: 1
LOSS OF SENSATION IN FEET: 1
DENIES PAIN: 1
PERSON REPORTING PAIN: PATIENT
DEPRESSION: 1

## 2024-09-19 ENCOUNTER — APPOINTMENT (OUTPATIENT)
Dept: DERMATOLOGY | Facility: CLINIC | Age: 84
End: 2024-09-19
Payer: MEDICARE

## 2024-09-19 DIAGNOSIS — D72.10 DRUG REACTION WITH EOSINOPHILIA AND SYSTEMIC SYMPTOMS: Primary | ICD-10-CM

## 2024-09-19 DIAGNOSIS — T50.905A DRUG REACTION WITH EOSINOPHILIA AND SYSTEMIC SYMPTOMS: Primary | ICD-10-CM

## 2024-09-19 PROCEDURE — G2211 COMPLEX E/M VISIT ADD ON: HCPCS | Performed by: STUDENT IN AN ORGANIZED HEALTH CARE EDUCATION/TRAINING PROGRAM

## 2024-09-19 PROCEDURE — 99214 OFFICE O/P EST MOD 30 MIN: CPT | Performed by: STUDENT IN AN ORGANIZED HEALTH CARE EDUCATION/TRAINING PROGRAM

## 2024-09-19 PROCEDURE — 1159F MED LIST DOCD IN RCRD: CPT | Performed by: STUDENT IN AN ORGANIZED HEALTH CARE EDUCATION/TRAINING PROGRAM

## 2024-09-19 RX ORDER — CLOBETASOL PROPIONATE 0.5 MG/G
OINTMENT TOPICAL 2 TIMES DAILY
Qty: 60 G | Refills: 3 | Status: SHIPPED | OUTPATIENT
Start: 2024-09-19

## 2024-09-19 RX ORDER — PREDNISONE 5 MG/1
25 TABLET ORAL EVERY MORNING
Qty: 150 TABLET | Refills: 0 | Status: SHIPPED | OUTPATIENT
Start: 2024-09-19 | End: 2024-10-19

## 2024-09-19 NOTE — PROGRESS NOTES
Subjective     Kerry Arevalo is a 84 y.o. female who presents for the following: Rash (Patient's symptoms have improved slightly while on prednisone; however, her rash is not entirely clear yet. Patient states her skin is still very itchy and burns on occasion. She is using OTC lotions to help with itching.).     Review of Systems:  No other skin or systemic complaints other than what is documented elsewhere in the note.    The following portions of the chart were reviewed this encounter and updated as appropriate:          Skin Cancer History  No skin cancer on file.      Specialty Problems    None       Objective   Well appearing patient in no apparent distress; mood and affect are within normal limits.    A focused skin examination was performed. All findings within normal limits unless otherwise noted below.    Assessment/Plan   1. 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   Discharged on steroids and triamcinolone, was doing well and stopped prednisone in July, now with severe erythrodermic flare  Labs normalized last month  Discussed that DRESS syndrome often requires slow long taper of topical steroids    Current plan:  Start prednisone 30 mg x 2 weeks  Then decrease to prednisone 25 mg x 2 weeks  Clobetasol prn to very itchy areas  Vaseline and Cerave Itch Relief to skin multiple times daily    Follow in 1 month for continued management of this complex chronic condition          predniSONE (Deltasone) 5 mg tablet  Take 5 tablets (25 mg) by mouth once daily in the morning. Start after completing two week course of prednisone 30 mg daily    Related Procedures  Follow Up In  Dermatology - Established Patient  Follow Up In Dermatology - Established Patient    Related Medications  predniSONE (Deltasone) 10 mg tablet  Take 4 tablets (40 mg) by mouth once daily in the morning for 14 days, THEN 3 tablets (30 mg) once daily in the morning for 14 days.

## 2024-09-24 ENCOUNTER — PATIENT MESSAGE (OUTPATIENT)
Dept: DERMATOLOGY | Facility: CLINIC | Age: 84
End: 2024-09-24
Payer: MEDICARE

## 2024-09-25 ENCOUNTER — HOSPITAL ENCOUNTER (OUTPATIENT)
Dept: RADIOLOGY | Facility: CLINIC | Age: 84
Discharge: HOME | End: 2024-09-25
Payer: MEDICARE

## 2024-09-25 ENCOUNTER — APPOINTMENT (OUTPATIENT)
Dept: PODIATRY | Facility: CLINIC | Age: 84
End: 2024-09-25
Payer: MEDICARE

## 2024-09-25 DIAGNOSIS — M79.671 FOOT PAIN, BILATERAL: Primary | ICD-10-CM

## 2024-09-25 DIAGNOSIS — M79.672 PAIN IN BOTH FEET: ICD-10-CM

## 2024-09-25 DIAGNOSIS — Z74.09 IMPAIRED FUNCTIONAL MOBILITY, BALANCE, GAIT, AND ENDURANCE: ICD-10-CM

## 2024-09-25 DIAGNOSIS — M79.672 FOOT PAIN, BILATERAL: Primary | ICD-10-CM

## 2024-09-25 DIAGNOSIS — M06.9 RHEUMATOID ARTHRITIS: ICD-10-CM

## 2024-09-25 DIAGNOSIS — R29.898 WEAKNESS OF BOTH LOWER EXTREMITIES: ICD-10-CM

## 2024-09-25 DIAGNOSIS — M21.42 PES PLANUS OF BOTH FEET: ICD-10-CM

## 2024-09-25 DIAGNOSIS — M79.671 PAIN IN BOTH FEET: ICD-10-CM

## 2024-09-25 DIAGNOSIS — M19.072 PRIMARY OSTEOARTHRITIS OF LEFT FOOT: ICD-10-CM

## 2024-09-25 DIAGNOSIS — M21.41 PES PLANUS OF BOTH FEET: ICD-10-CM

## 2024-09-25 PROCEDURE — 1036F TOBACCO NON-USER: CPT | Performed by: PODIATRIST

## 2024-09-25 PROCEDURE — 99204 OFFICE O/P NEW MOD 45 MIN: CPT | Performed by: PODIATRIST

## 2024-09-25 PROCEDURE — 1160F RVW MEDS BY RX/DR IN RCRD: CPT | Performed by: PODIATRIST

## 2024-09-25 PROCEDURE — 1159F MED LIST DOCD IN RCRD: CPT | Performed by: PODIATRIST

## 2024-09-25 PROCEDURE — 73630 X-RAY EXAM OF FOOT: CPT | Mod: 50

## 2024-09-25 PROCEDURE — 73630 X-RAY EXAM OF FOOT: CPT | Mod: BILATERAL PROCEDURE | Performed by: RADIOLOGY

## 2024-09-25 PROCEDURE — 99214 OFFICE O/P EST MOD 30 MIN: CPT | Performed by: PODIATRIST

## 2024-09-25 NOTE — PROGRESS NOTES
Chief Complaint   Patient presents with    Foot Pain    Ankle Pain     New Patient is here today for left foot/ankle pain and right bunion pain. PCP referred. Denies of any recent falls or injury. No recent xrays taken. Loses her balance a lot. Tried using several types of shoes. Constant swelling in both feet. Wears compression stockings         Here with Brisa, daughter.    Painful feet and ankle.  Had diffuse lower extremity swelling from taking sulfa.  Complains that feels stiffness and swelling left foot.   Collapse of the medial arch of the foot.  Bony prominence appreciated.  Feels that the foot cramps and flexes downward.  This is primarily at night.  Uses topical Voltaren gel which seems to help primarily at night.  Denies any trauma to the feet.  States that she was given sulfa had a reaction to that and ended up with DRESS syndrome.  Has had physical and Occupational Therapy at home.  Physical has been working with the foot.  This is helped.  Wishes it to continue.  History of falls, no real injury. Uses rollator.  Does feel unsteady on her feet.    Review of systems negative except as noted above.    Medication and allergies reviewed.    General/Constitutional: Alert. NAD.   Respiratory: Non labored breathing.   Psychiatric: Mood and affect normal/baseline.   HEENT: Sclera clear. Wearing corrective lenses.  Dermatologic: Nails are stable but dystrophic.  No acute inflammatory infectious process. Web spaces are dry. No ulcers no pre-ulcerative areas.  Vascular: Pedal pulses are intact and symmetric including the dorsalis pedis and the posterior tibial pulses. Feet are warm to touch. No swelling appreciated either extremity.  Neurological: Alert and oriented. No acute distress.  Decreased monofilament testing bilaterally.  Musculoskeletal: Bilateral decreased plantarflexion strength.  +3/5.  Dorsiflexion +5/5.  Inversion eversion limited on both feet.  Left foot demonstrates pes planus deformity  degenerative midfoot changes.  Prominence talonavicular area.  Right foot not as severe of a flatfoot deformity.  The discomfort is beneath the talonavicular prominence on the left side.    Impression: Lower extremity cramping, DJD, pes planus, gait imbalance, dropfoot deformity/motor weakness.  -Today's treatment and course of therapy was discussed with the patient in detail. Patient's questions were answered. Proper foot care was discussed. This dictation was done using Dragon computer software and as such may contain grammatical errors.    -Order continuation home physical therapy.    -Will recommend continuation of physical therapy especially for the left foot.  Strength training.  Gait and balance training.    -Continue with using rollator.  I do not recommend going without it.    -Also more supportive shoe gear will help support the mid arch of the foot.    -Recommend magnesium supplements 250 mg daily.    -Will see her back in about 6 weeks.

## 2024-09-26 ENCOUNTER — HOME CARE VISIT (OUTPATIENT)
Dept: HOME HEALTH SERVICES | Facility: HOME HEALTH | Age: 84
End: 2024-09-26
Payer: MEDICARE

## 2024-09-26 VITALS — TEMPERATURE: 98.6 F | OXYGEN SATURATION: 98 % | HEART RATE: 84 BPM | RESPIRATION RATE: 14 BRPM

## 2024-09-26 PROCEDURE — G0151 HHCP-SERV OF PT,EA 15 MIN: HCPCS

## 2024-09-26 SDOH — HEALTH STABILITY: PHYSICAL HEALTH: EXERCISE TYPE: UPDATED HEP

## 2024-09-26 ASSESSMENT — PAIN SCALES - PAIN ASSESSMENT IN ADVANCED DEMENTIA (PAINAD)
NEGVOCALIZATION: 0 - NONE.
BODYLANGUAGE: 0
TOTALSCORE: 2
CONSOLABILITY: 0 - NO NEED TO CONSOLE.
CONSOLABILITY: 0
BODYLANGUAGE: 0 - RELAXED.
BREATHING: 0
FACIALEXPRESSION: 2
NEGVOCALIZATION: 0
FACIALEXPRESSION: 2 - FACIAL GRIMACING.

## 2024-09-26 ASSESSMENT — ENCOUNTER SYMPTOMS
SUBJECTIVE PAIN PROGRESSION: GRADUALLY IMPROVING
DEPRESSION: 0
LOSS OF SENSATION IN FEET: 1
PAIN LOCATION - PAIN QUALITY: ACHING
PAIN: 1
LOWEST PAIN SEVERITY IN PAST 24 HOURS: 2/10
PAIN LOCATION - PAIN FREQUENCY: FREQUENT
PAIN LOCATION - PAIN SEVERITY: 3/10
HIGHEST PAIN SEVERITY IN PAST 24 HOURS: 4/10
OCCASIONAL FEELINGS OF UNSTEADINESS: 0
PERSON REPORTING PAIN: PATIENT

## 2024-09-26 ASSESSMENT — ACTIVITIES OF DAILY LIVING (ADL)
AMBULATION ASSISTANCE ON UNEVEN SURFACES: 1
AMBULATION_DISTANCE/DURATION_TOLERATED: ROLLATOR WALKER
CURRENT_FUNCTION: STAND BY ASSIST
AMBULATION ASSISTANCE ON FLAT SURFACES: 1

## 2024-10-03 ENCOUNTER — HOME CARE VISIT (OUTPATIENT)
Dept: HOME HEALTH SERVICES | Facility: HOME HEALTH | Age: 84
End: 2024-10-03
Payer: MEDICARE

## 2024-10-03 VITALS — TEMPERATURE: 98.6 F | OXYGEN SATURATION: 98 % | HEART RATE: 85 BPM | RESPIRATION RATE: 14 BRPM

## 2024-10-03 PROCEDURE — G0151 HHCP-SERV OF PT,EA 15 MIN: HCPCS

## 2024-10-03 SDOH — HEALTH STABILITY: PHYSICAL HEALTH: EXERCISE TYPE: UPDATED HEP

## 2024-10-03 ASSESSMENT — ENCOUNTER SYMPTOMS
PAIN LOCATION - PAIN QUALITY: ACHE
PAIN LOCATION - PAIN SEVERITY: 4/10
PERSON REPORTING PAIN: PATIENT
PAIN LOCATION: GENERALIZED
SUBJECTIVE PAIN PROGRESSION: WAXING AND WANING
OCCASIONAL FEELINGS OF UNSTEADINESS: 0
PAIN: 1
PAIN LOCATION - PAIN FREQUENCY: INTERMITTENT

## 2024-10-03 ASSESSMENT — ACTIVITIES OF DAILY LIVING (ADL)
OASIS_M1830: 00
AMBULATION ASSISTANCE ON UNEVEN SURFACES: 1
HOME_HEALTH_OASIS: 00
AMBULATION ASSISTANCE ON FLAT SURFACES: 1

## 2024-10-04 ASSESSMENT — ENCOUNTER SYMPTOMS
LIMITED RANGE OF MOTION: 1
ARTHRALGIAS: 1
MUSCLE WEAKNESS: 1

## 2024-10-09 ENCOUNTER — LAB (OUTPATIENT)
Dept: LAB | Facility: LAB | Age: 84
End: 2024-10-09
Payer: MEDICARE

## 2024-10-09 ENCOUNTER — APPOINTMENT (OUTPATIENT)
Dept: PRIMARY CARE | Facility: CLINIC | Age: 84
End: 2024-10-09
Payer: MEDICARE

## 2024-10-09 DIAGNOSIS — E78.5 HYPERLIPIDEMIA, UNSPECIFIED HYPERLIPIDEMIA TYPE: ICD-10-CM

## 2024-10-09 DIAGNOSIS — M79.672 PAIN IN BOTH FEET: ICD-10-CM

## 2024-10-09 DIAGNOSIS — R26.89 BALANCE DISORDER: ICD-10-CM

## 2024-10-09 DIAGNOSIS — E55.9 HYPOVITAMINOSIS D: ICD-10-CM

## 2024-10-09 DIAGNOSIS — D64.9 ANEMIA, UNSPECIFIED TYPE: ICD-10-CM

## 2024-10-09 DIAGNOSIS — R73.03 PREDIABETES: ICD-10-CM

## 2024-10-09 DIAGNOSIS — R53.81 PHYSICAL DECONDITIONING: ICD-10-CM

## 2024-10-09 DIAGNOSIS — G62.9 NEUROPATHY: ICD-10-CM

## 2024-10-09 DIAGNOSIS — R53.81 PHYSICAL DECONDITIONING: Primary | ICD-10-CM

## 2024-10-09 DIAGNOSIS — M79.671 PAIN IN BOTH FEET: ICD-10-CM

## 2024-10-09 PROCEDURE — 84443 ASSAY THYROID STIM HORMONE: CPT

## 2024-10-09 PROCEDURE — 99215 OFFICE O/P EST HI 40 MIN: CPT | Performed by: INTERNAL MEDICINE

## 2024-10-09 PROCEDURE — G0136 PR ADM OF SOC DTR ASSESS 5-15 M: HCPCS | Performed by: INTERNAL MEDICINE

## 2024-10-09 PROCEDURE — 80061 LIPID PANEL: CPT

## 2024-10-09 PROCEDURE — 85027 COMPLETE CBC AUTOMATED: CPT

## 2024-10-09 PROCEDURE — 80053 COMPREHEN METABOLIC PANEL: CPT

## 2024-10-09 PROCEDURE — 83540 ASSAY OF IRON: CPT

## 2024-10-09 PROCEDURE — 36415 COLL VENOUS BLD VENIPUNCTURE: CPT

## 2024-10-09 PROCEDURE — 83550 IRON BINDING TEST: CPT

## 2024-10-09 PROCEDURE — 82728 ASSAY OF FERRITIN: CPT

## 2024-10-09 NOTE — PROGRESS NOTES
Chief Complaint:   Chief Complaint   Patient presents with    Follow-up      HPI    Kerry Arevalo is a 84 y.o. year old female who presents to the clinic for follow up    Past Medical History   Past Medical History:   Diagnosis Date    Body mass index (BMI) 25.0-25.9, adult 12/18/2019    Body mass index (BMI) of 25.0 to 25.9 in adult    Body mass index (BMI) 27.0-27.9, adult 04/26/2022    Body mass index (BMI) of 27.0 to 27.9 in adult    Body mass index (BMI) 28.0-28.9, adult 06/14/2022    Body mass index (BMI) of 28.0 to 28.9 in adult    Body mass index (BMI) 29.0-29.9, adult 08/16/2021    Body mass index (BMI) of 29.0 to 29.9 in adult    Body mass index (BMI)30.0-30.9, adult 01/27/2021    Body mass index (BMI) of 30.0 to 30.9 in adult    Disappearance and death of family member 10/31/2013    Bereavement, uncomplicated    Encounter for screening mammogram for malignant neoplasm of breast 06/27/2016    Encounter for screening mammogram for breast cancer    Macular cyst, hole, or pseudohole, right eye     Macular hole, right    Menopausal and female climacteric states 11/04/2015    Menopausal symptoms    Other postherpetic nervous system involvement 07/24/2017    Postherpetic neuralgia    Other specified disorders of left ear 12/27/2016    Fullness in ear, left    Personal history of diseases of the blood and blood-forming organs and certain disorders involving the immune mechanism 02/19/2013    History of anemia    Personal history of diseases of the skin and subcutaneous tissue 05/12/2013    History of hair or hair follicle disorder    Personal history of other diseases of the musculoskeletal system and connective tissue     History of arthritis    Personal history of other diseases of the nervous system and sense organs     History of cataract    Personal history of other diseases of the respiratory system 03/04/2019    History of acute bronchitis    Personal history of other infectious and parasitic diseases  03/09/2017    History of herpes zoster    Personal history of other specified conditions 04/11/2019    History of weight loss    Unspecified asthma, uncomplicated ([facility]) 12/19/2022    Asthma      Past Surgical History:   Past Surgical History:   Procedure Laterality Date    CATARACT EXTRACTION  10/14/2014    Cataract Surgery    COLONOSCOPY  02/19/2013    Complete Colonoscopy    HERNIA REPAIR  09/26/2016    Hernia Repair    OTHER SURGICAL HISTORY  05/12/2013    Repair Of Retinal Detachment With Macular Detachment    TOTAL ABDOMINAL HYSTERECTOMY W/ BILATERAL SALPINGOOPHORECTOMY  02/19/2013    Total Abdominal Hysterectomy With Removal Of Both Ovaries     Family History:   Family History   Problem Relation Name Age of Onset    Heart failure Mother      Osteoporosis Mother      Other (cerebral hemorrhage) Father      Alzheimer's disease Sister      Heart attack Brother      Breast cancer Other       Social History:   Tobacco Use: Low Risk  (9/25/2024)    Patient History     Smoking Tobacco Use: Never     Smokeless Tobacco Use: Never     Passive Exposure: Not on file      Social History     Substance and Sexual Activity   Alcohol Use Not Currently        Allergies:   Allergies   Allergen Reactions    Sulfasalazine Dermatitis and Other     DRESS syndrome    Duloxetine Other     Weight loss    Topiramate Other     Appetite and  weight loss        ROS   Review of Systems   All other systems reviewed and are negative.       Objective   There were no vitals filed for this visit.   BMI Readings from Last 15 Encounters:   09/17/24 23.47 kg/m²   08/13/24 23.47 kg/m²   07/20/24 26.73 kg/m²   06/18/24 26.73 kg/m²   06/09/24 28.34 kg/m²   05/31/24 27.87 kg/m²   05/20/24 27.89 kg/m²   04/23/24 28.59 kg/m²   02/20/24 30.68 kg/m²   12/22/23 29.90 kg/m²   10/30/23 29.66 kg/m²   09/08/23 29.66 kg/m²   07/31/23 29.66 kg/m²   05/25/23 29.17 kg/m²   03/28/23 28.93 kg/m²      45.8 kg (101 lb) (8/13/2024  2:00 PM)      Physical  Exam  Physical Exam  Neurological:      Mental Status: She is alert.   Psychiatric:         Mood and Affect: Mood normal.          Labs:  CBC:  Recent Labs     08/12/24  1058 07/20/24  1833 07/20/24  1357   WBC 5.5 7.2 7.1   HGB 9.0* 7.5* 6.9*   HCT 29.3* 24.9* 21.5*    145* 181   * 100 94     CMP:  Recent Labs     08/12/24  1058 07/21/24  0720 07/20/24  1357    144 144   K 4.0 3.3* 3.2*    112* 112*   CO2 23 24 23   ANIONGAP 13 11 12   BUN 13 9 12   CREATININE 0.50 0.48* 0.50   EGFR >90 >90 >90   GLUCOSE 105* 68* 85     Recent Labs     08/12/24  1058 07/20/24  1357 07/20/24  1024   ALBUMIN 3.4 2.7* 3.3*   ALKPHOS 69 69 84   ALT 10 18 23   AST 18 26 36   BILITOT 0.4 0.7 0.9     Calcium/Phos:   Lab Results   Component Value Date    CALCIUM 9.1 08/12/2024    PHOS 2.2 (L) 06/13/2024      COAG:   Recent Labs     07/20/24  1357 07/20/24  1024 06/09/24  1746   INR 1.2* 1.2* 1.5*     CRP:   Lab Results   Component Value Date    CRP 2.07 (H) 07/20/2024      [unfilled]   ENDO:  Recent Labs     07/20/24  1024 07/05/24  0958 06/07/24  2136 10/30/23  1343 07/31/23  1410 12/19/22  1152   TSH 1.61 1.22 1.74 1.10 1.41 1.63   HGBA1C 5.4  --   --  5.8* 5.7* 5.6      CARDIAC:   Recent Labs     07/20/24  1024 06/07/24  2136   TROPHS 7 15     Recent Labs     10/30/23  1343 07/31/23  1410 12/19/22  1152 04/26/22  1143   CHOL 198 191 195 222*   LDLF  --  110* 114* 131*   LDLCALC 105*  --   --   --    HDL 55.3 52.3 49.5 59.1   TRIG 191* 142 158* 160*     No data recorded    Current Medications:  Current Outpatient Medications   Medication Sig Dispense Refill    azelastine (Astelin) 137 mcg (0.1 %) nasal spray Administer 2 sprays into each nostril 2 times a day.      calcium citrate (Calcitrate) 200 mg (950 mg) tablet Take 1 tablet (200 mg) by mouth once daily. 90 tablet 3    clobetasol (Temovate) 0.05 % ointment Apply topically 2 times a day. As needed to very itchy areas 60 g 3    diclofenac sodium  "(Voltaren) 1 % gel Apply 4.5 inches (4 g) topically 4 times a day as needed (pain). 200 g 3    erythromycin (Romycin) 5 mg/gram (0.5 %) ophthalmic ointment APPLY TO BOTH EYES AT BEDTIME FOR 10 DAYS APPLY AMOUNT PER DOSE: 0.5 INCH (~1 CM) PER DOSE.      losartan (Cozaar) 50 mg tablet TAKE 1 TABLET BY MOUTH ONCE  DAILY 90 tablet 0    methylPREDNISolone (Medrol) 4 mg tablet Take by mouth.      montelukast (Singulair) 10 mg tablet TAKE 1 TABLET BY MOUTH ONCE  DAILY 90 tablet 3    multivit-minerals/folic acid (ONE-A-DAY WOMEN'S 50 PLUS ORAL) Take 1 tablet by mouth once daily.      pravastatin (Pravachol) 40 mg tablet Take 1 tablet (40 mg) by mouth once daily at bedtime.      predniSONE (Deltasone) 5 mg tablet Take 5 tablets (25 mg) by mouth once daily in the morning. Start after completing two week course of prednisone 30 mg daily 150 tablet 0    sennosides-docusate sodium (Brittny-Colace) 8.6-50 mg tablet Take 1 tablet by mouth once daily.       No current facility-administered medications for this visit.       Assessment and Plan  Kerry \"Kerry Arevalo\" was seen today for follow-up.  Diagnoses and all orders for this visit:  Physical deconditioning (Primary)  -     CBC; Future  -     Comprehensive Metabolic Panel; Future  -     TSH with reflex to Free T4 if abnormal; Future  -     Lipid Panel; Future  -     Referral to Physical Therapy; Future  Prediabetes  -     CBC; Future  -     Comprehensive Metabolic Panel; Future  -     TSH with reflex to Free T4 if abnormal; Future  -     Lipid Panel; Future  Hypovitaminosis D  -     CBC; Future  -     Comprehensive Metabolic Panel; Future  -     TSH with reflex to Free T4 if abnormal; Future  -     Lipid Panel; Future  Hyperlipidemia, unspecified hyperlipidemia type  -     Lipid Panel; Future  Pain in both feet  -     Referral to Physical Therapy; Future  Neuropathy  -     Referral to Physical Therapy; Future  Balance disorder  -     Referral to Physical Therapy; Future     Very " complex medical patient.  Had several hospital admissions.  First one was for DRESS syndrome, which was thought to be due to Sulfasalazine, which was started for her arthritis.  Second admission for a mechanical fall with traumatic brain injury, with brain hemorrhage and SDH.  She is here with her daughter, who is very aggressive and is not willing to participate in a normal discussion.  I asked the daughter to leave the room and spoke with patient alone.  The patient apologized for her daughter's behavior, and explained to me that due to her medical conditions, the daughter has been actively involved in her medical care and comes to appointments with her.  The patient doesn't have any concerns with our practice and is not wanting to see another physician, she is just frustrated with all her current issues, which her balance problems, with her burning feet, recurrent falls, unsteady gait.  We reviewed her chart in details, we reviewed her hospital notes and discharge summaries, her most recent BW and specialist notes. At this point, she is doing very well, in my opinion, her condition is stabilizing and her activity level is advancing. I recommend to continue with outpatient PT< continue with all her meds and supplements.  We will recheck her BW.  I am concerned about her anemia, as I don't have any specific source of it identified.  We will get iron studies and may need to set her up with a hematologist      Immunizations:  Immunization History   Administered Date(s) Administered    Flu vaccine, quadrivalent, high-dose, preservative free, age 65y+ (FLUZONE) 12/10/2020    Pfizer COVID-19 vaccine, 12 years and older, (30mcg/0.3mL) (Comirnaty) 10/02/2023    Pfizer COVID-19 vaccine, bivalent, age 12 years and older (30 mcg/0.3 mL) 10/25/2022    Pfizer Gray Cap SARS-CoV-2 05/02/2022    Pfizer Purple Cap SARS-CoV-2 01/29/2021, 02/26/2021, 04/26/2021, 10/05/2021    Pneumococcal conjugate vaccine, 13-valent (PREVNAR 13)  03/17/2016    Td vaccine, age 7 years and older (TDVAX) 01/01/2003    Tdap vaccine, age 7 year and older (BOOSTRIX, ADACEL) 04/21/2014    Zoster vaccine, recombinant, adult (SHINGRIX) 01/01/2014     Please follow up in 2 months  Due to the patient's social risk factors that could influence the diagnosis and treatment of chronic conditions, a separate discussion about these factors was performed during the wellness visit.  The total time spent on discussing these factors was more than 5 minutes, which included the discussions about medical transportation, meal availability, help with medication compliance, adherence to the specific diet and exercise regiment.

## 2024-10-10 LAB
ALBUMIN SERPL BCP-MCNC: 3.8 G/DL (ref 3.4–5)
ALP SERPL-CCNC: 78 U/L (ref 33–136)
ALT SERPL W P-5'-P-CCNC: 13 U/L (ref 7–45)
ANION GAP SERPL CALC-SCNC: 14 MMOL/L (ref 10–20)
AST SERPL W P-5'-P-CCNC: 20 U/L (ref 9–39)
BILIRUB SERPL-MCNC: 0.5 MG/DL (ref 0–1.2)
BUN SERPL-MCNC: 17 MG/DL (ref 6–23)
CALCIUM SERPL-MCNC: 9.5 MG/DL (ref 8.6–10.6)
CHLORIDE SERPL-SCNC: 107 MMOL/L (ref 98–107)
CHOLEST SERPL-MCNC: 257 MG/DL (ref 0–199)
CHOLESTEROL/HDL RATIO: 3.6
CO2 SERPL-SCNC: 26 MMOL/L (ref 21–32)
CREAT SERPL-MCNC: 0.7 MG/DL (ref 0.5–1.05)
EGFRCR SERPLBLD CKD-EPI 2021: 86 ML/MIN/1.73M*2
ERYTHROCYTE [DISTWIDTH] IN BLOOD BY AUTOMATED COUNT: 12 % (ref 11.5–14.5)
FERRITIN SERPL-MCNC: 104 NG/ML (ref 8–150)
GLUCOSE SERPL-MCNC: 95 MG/DL (ref 74–99)
HCT VFR BLD AUTO: 37.1 % (ref 36–46)
HDLC SERPL-MCNC: 70.7 MG/DL
HGB BLD-MCNC: 12.3 G/DL (ref 12–16)
IRON SATN MFR SERPL: 31 % (ref 25–45)
IRON SERPL-MCNC: 101 UG/DL (ref 35–150)
LDLC SERPL CALC-MCNC: 139 MG/DL
MCH RBC QN AUTO: 31 PG (ref 26–34)
MCHC RBC AUTO-ENTMCNC: 33.2 G/DL (ref 32–36)
MCV RBC AUTO: 94 FL (ref 80–100)
NON HDL CHOLESTEROL: 186 MG/DL (ref 0–149)
NRBC BLD-RTO: 0 /100 WBCS (ref 0–0)
PLATELET # BLD AUTO: 188 X10*3/UL (ref 150–450)
POTASSIUM SERPL-SCNC: 3.4 MMOL/L (ref 3.5–5.3)
PROT SERPL-MCNC: 6.5 G/DL (ref 6.4–8.2)
RBC # BLD AUTO: 3.97 X10*6/UL (ref 4–5.2)
SODIUM SERPL-SCNC: 144 MMOL/L (ref 136–145)
TIBC SERPL-MCNC: 328 UG/DL (ref 240–445)
TRIGL SERPL-MCNC: 236 MG/DL (ref 0–149)
TSH SERPL-ACNC: 1.73 MIU/L (ref 0.44–3.98)
UIBC SERPL-MCNC: 227 UG/DL (ref 110–370)
VLDL: 47 MG/DL (ref 0–40)
WBC # BLD AUTO: 8 X10*3/UL (ref 4.4–11.3)

## 2024-10-10 NOTE — RESULT ENCOUNTER NOTE
Has slightly low potassium  Increase potassium in the diet  Give her a list of foods high in potassium

## 2024-10-14 ENCOUNTER — TELEPHONE (OUTPATIENT)
Dept: DERMATOLOGY | Facility: CLINIC | Age: 84
End: 2024-10-14
Payer: MEDICARE

## 2024-10-16 ENCOUNTER — OFFICE VISIT (OUTPATIENT)
Dept: DERMATOLOGY | Facility: CLINIC | Age: 84
End: 2024-10-16

## 2024-10-16 ENCOUNTER — APPOINTMENT (OUTPATIENT)
Dept: DERMATOLOGY | Facility: CLINIC | Age: 84
End: 2024-10-16
Payer: MEDICARE

## 2024-10-16 DIAGNOSIS — D72.10 DRUG REACTION WITH EOSINOPHILIA AND SYSTEMIC SYMPTOMS: ICD-10-CM

## 2024-10-16 DIAGNOSIS — T50.905A DRUG REACTION WITH EOSINOPHILIA AND SYSTEMIC SYMPTOMS: ICD-10-CM

## 2024-10-16 PROCEDURE — 99214 OFFICE O/P EST MOD 30 MIN: CPT | Performed by: STUDENT IN AN ORGANIZED HEALTH CARE EDUCATION/TRAINING PROGRAM

## 2024-10-16 PROCEDURE — G2211 COMPLEX E/M VISIT ADD ON: HCPCS | Performed by: STUDENT IN AN ORGANIZED HEALTH CARE EDUCATION/TRAINING PROGRAM

## 2024-10-16 PROCEDURE — 1159F MED LIST DOCD IN RCRD: CPT | Performed by: STUDENT IN AN ORGANIZED HEALTH CARE EDUCATION/TRAINING PROGRAM

## 2024-10-17 ENCOUNTER — TELEPHONE (OUTPATIENT)
Dept: DERMATOLOGY | Facility: CLINIC | Age: 84
End: 2024-10-17
Payer: MEDICARE

## 2024-10-17 RX ORDER — FLUOCINONIDE TOPICAL SOLUTION USP, 0.05% 0.5 MG/ML
SOLUTION TOPICAL 2 TIMES DAILY
Qty: 60 ML | Refills: 11 | Status: SHIPPED | OUTPATIENT
Start: 2024-10-17 | End: 2025-10-17

## 2024-10-17 RX ORDER — PREDNISONE 5 MG/1
TABLET ORAL
Qty: 98 TABLET | Refills: 0 | Status: SHIPPED | OUTPATIENT
Start: 2024-10-17 | End: 2024-11-28

## 2024-10-17 RX ORDER — CLOBETASOL PROPIONATE 0.5 MG/G
OINTMENT TOPICAL 2 TIMES DAILY
Qty: 60 G | Refills: 3 | Status: SHIPPED | OUTPATIENT
Start: 2024-10-17

## 2024-10-17 NOTE — PROGRESS NOTES
Subjective     Kerry Arevalo is a 84 y.o. female who presents for the following: Rash (Follow up for drug reaction. Patient's symptoms have improved but are not completely resolved. The most irritated areas are where her clothing is snug around waist and breasts. She has continued with Prednisone; however, opted to not use Clobetasol. She has been using Vaseline as needed instead. ) and Nail Problem (Patient's fingernails and toenails have been lifting and she is wondering if she can clip the ends.).     Review of Systems:  No other skin or systemic complaints other than what is documented elsewhere in the note.    The following portions of the chart were reviewed this encounter and updated as appropriate:          Skin Cancer History  No skin cancer on file.      Specialty Problems    None       Objective   Well appearing patient in no apparent distress; mood and affect are within normal limits.    A focused skin examination was performed. All findings within normal limits unless otherwise noted below.    Assessment/Plan   1. 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 Will call and affirm the plan.   Clobetasol prn to very itchy areas  Lidex solution prn to scalp  Vaseline and Cerave Itch Relief to skin multiple times daily    Follow in 1 month for continued management of this complex chronic condition          fluocinonide (Lidex) 0.05 % external solution  Apply topically 2 times a day. As needed for rash and itching on scalp    Related Procedures  Follow Up In Dermatology - Established Patient  Follow Up In Dermatology - Established Patient    Related Medications  predniSONE (Deltasone) 5 mg tablet  Take 3 tablets (15 mg) by mouth once daily in the morning for 14 days, THEN 2 tablets (10 mg) once daily in the morning for 28 days. Start after completing two week course of prednisone 30 mg daily.    clobetasol (Temovate) 0.05 % ointment  Apply topically 2 times a day. As needed to very itchy areas on trunk and arms

## 2024-10-28 ENCOUNTER — TELEPHONE (OUTPATIENT)
Dept: RHEUMATOLOGY | Facility: CLINIC | Age: 84
End: 2024-10-28

## 2024-10-28 ENCOUNTER — OFFICE VISIT (OUTPATIENT)
Dept: PODIATRY | Facility: CLINIC | Age: 84
End: 2024-10-28
Payer: MEDICARE

## 2024-10-28 DIAGNOSIS — L60.3 NAIL DYSTROPHY: ICD-10-CM

## 2024-10-28 DIAGNOSIS — R29.898 WEAKNESS OF BOTH LOWER EXTREMITIES: ICD-10-CM

## 2024-10-28 DIAGNOSIS — M79.671 PAIN IN BOTH FEET: Primary | ICD-10-CM

## 2024-10-28 DIAGNOSIS — M19.072 PRIMARY OSTEOARTHRITIS OF LEFT FOOT: ICD-10-CM

## 2024-10-28 DIAGNOSIS — M06.9 RHEUMATOID ARTHRITIS INVOLVING BOTH FEET, UNSPECIFIED WHETHER RHEUMATOID FACTOR PRESENT (MULTI): ICD-10-CM

## 2024-10-28 DIAGNOSIS — M79.672 PAIN IN BOTH FEET: Primary | ICD-10-CM

## 2024-10-28 DIAGNOSIS — M21.42 PES PLANUS OF BOTH FEET: ICD-10-CM

## 2024-10-28 DIAGNOSIS — M21.41 PES PLANUS OF BOTH FEET: ICD-10-CM

## 2024-10-28 PROCEDURE — 99214 OFFICE O/P EST MOD 30 MIN: CPT | Performed by: PODIATRIST

## 2024-10-28 PROCEDURE — 1159F MED LIST DOCD IN RCRD: CPT | Performed by: PODIATRIST

## 2024-10-28 PROCEDURE — 1036F TOBACCO NON-USER: CPT | Performed by: PODIATRIST

## 2024-10-28 PROCEDURE — 1160F RVW MEDS BY RX/DR IN RCRD: CPT | Performed by: PODIATRIST

## 2024-11-04 ENCOUNTER — PATIENT MESSAGE (OUTPATIENT)
Dept: DERMATOLOGY | Facility: CLINIC | Age: 84
End: 2024-11-04

## 2024-11-20 ENCOUNTER — LAB (OUTPATIENT)
Dept: LAB | Facility: LAB | Age: 84
End: 2024-11-20
Payer: MEDICARE

## 2024-11-20 ENCOUNTER — APPOINTMENT (OUTPATIENT)
Dept: DERMATOLOGY | Facility: CLINIC | Age: 84
End: 2024-11-20
Payer: MEDICARE

## 2024-11-20 DIAGNOSIS — D72.10 DRUG REACTION WITH EOSINOPHILIA AND SYSTEMIC SYMPTOMS: ICD-10-CM

## 2024-11-20 DIAGNOSIS — L29.9 PRURITUS, UNSPECIFIED: ICD-10-CM

## 2024-11-20 DIAGNOSIS — T50.905A DRUG REACTION WITH EOSINOPHILIA AND SYSTEMIC SYMPTOMS: ICD-10-CM

## 2024-11-20 DIAGNOSIS — D72.10 DRUG REACTION WITH EOSINOPHILIA AND SYSTEMIC SYMPTOMS: Primary | ICD-10-CM

## 2024-11-20 DIAGNOSIS — T50.905A DRUG REACTION WITH EOSINOPHILIA AND SYSTEMIC SYMPTOMS: Primary | ICD-10-CM

## 2024-11-20 LAB — TSH SERPL-ACNC: 1.3 MIU/L (ref 0.44–3.98)

## 2024-11-20 PROCEDURE — 99214 OFFICE O/P EST MOD 30 MIN: CPT | Performed by: STUDENT IN AN ORGANIZED HEALTH CARE EDUCATION/TRAINING PROGRAM

## 2024-11-20 PROCEDURE — 36415 COLL VENOUS BLD VENIPUNCTURE: CPT

## 2024-11-20 PROCEDURE — 84443 ASSAY THYROID STIM HORMONE: CPT

## 2024-11-20 PROCEDURE — 1159F MED LIST DOCD IN RCRD: CPT | Performed by: STUDENT IN AN ORGANIZED HEALTH CARE EDUCATION/TRAINING PROGRAM

## 2024-11-20 PROCEDURE — G2211 COMPLEX E/M VISIT ADD ON: HCPCS | Performed by: STUDENT IN AN ORGANIZED HEALTH CARE EDUCATION/TRAINING PROGRAM

## 2024-11-20 RX ORDER — PREDNISONE 5 MG/1
TABLET ORAL
Qty: 84 TABLET | Refills: 0 | Status: SHIPPED | OUTPATIENT
Start: 2024-11-20 | End: 2025-01-15

## 2024-11-20 RX ORDER — FLUOCINOLONE ACETONIDE 0.11 MG/ML
1 OIL TOPICAL NIGHTLY
Qty: 118.28 ML | Refills: 11 | Status: SHIPPED | OUTPATIENT
Start: 2024-11-20

## 2024-11-20 NOTE — PATIENT INSTRUCTIONS
Starting tomorrow 11/21/2024:  10 mg of prednisone every morning (two 5 mg tablets)  For 4 weeks  Then 5 mg of prednisone of morning   For 4 weeks    Follow up in 8 weeks    Fluocinolone oil to scalp nightly.   The nails will continue to grow out and improve. Soak in water 5 minutes before clipping.

## 2024-11-21 NOTE — PROGRESS NOTES
Subjective     Kerry Arevalo is a 84 y.o. female who presents for the following: Rash (Follow up for DRESS, originally occurred in June, as of last visit treatment plan was to continue prednisone 15 mg x 2 weeks, then decrease to 10 mg.  States still taking still taking the 15mg daily. Clobetasol prn to very itchy areas. States the rash still comes and goes but overall improving. //), Dermatitis (To scalp, was Rx'd fluocinonide 0.05% solution BID. Stopped using due to irritation and greasy feel that is left behind when used. Still reports itch. ), and Nail Problem (Thin, brittle, cracking, tender. Began a few months ago. ).     Review of Systems:  No other skin or systemic complaints other than what is documented elsewhere in the note.    The following portions of the chart were reviewed this encounter and updated as appropriate:          Skin Cancer History  No skin cancer on file.      Specialty Problems    None       Objective   Well appearing patient in no apparent distress; mood and affect are within normal limits.    A focused skin examination was performed. All findings within normal limits unless otherwise noted below.    Assessment/Plan   1. 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  Significantly improved from prior visit  Nails with distal dystrophy, growing in well  Scaling in scalp    Irritation and itching still present  No LAD  No facial swelling  CBC and CMP on 10/9 with continued improvement in liver function and blood counts  TSH wnl, repeats wnl    Diagnosed during hospitalization in June, did have biopsy c/w drug eruption with transaminitis and positive HSV 6   Discharged on steroids and triamcinolone, was doing well and stopped prednisone in July, now with severe erythrodermic flare  Labs normalized last month  Discussed that DRESS syndrome often requires slow long taper of topical steroids    Written instruction provided to patient and daughter  10 mg prednisone x 4 weeks  5  mg prednisone x 4 week  Fluocinolone oil to scalp nightly (most pruritic area)  Vaseline and Cerave Itch Relief to skin multiple times daily    Follow in 2 months for continued management of this complex chronic condition          fluocinolone and shower cap 0.01 % oil  1 Application by scalp route once daily at bedtime.    TSH w/Reflex To Free T4 If Abnormal    Related Procedures  Follow Up In Dermatology - Established Patient  Follow Up In Dermatology - Established Patient    Related Medications  clobetasol (Temovate) 0.05 % ointment  Apply topically 2 times a day. As needed to very itchy areas on trunk and arms    fluocinonide (Lidex) 0.05 % external solution  Apply topically 2 times a day. As needed for rash and itching on scalp    predniSONE (Deltasone) 5 mg tablet  Take 2 tablets (10 mg) by mouth once daily in the morning for 28 days, THEN 1 tablet (5 mg) once daily in the morning for 28 days. Start after completing two week course of prednisone 30 mg daily.    2. Pruritus, unspecified    Related Procedures  TSH w/Reflex To Free T4 If Abnormal

## 2024-11-26 ENCOUNTER — EVALUATION (OUTPATIENT)
Dept: PHYSICAL THERAPY | Facility: CLINIC | Age: 84
End: 2024-11-26
Payer: MEDICARE

## 2024-11-26 DIAGNOSIS — R26.89 IMPAIRED GAIT AND MOBILITY: Primary | ICD-10-CM

## 2024-11-26 DIAGNOSIS — G62.9 NEUROPATHY: ICD-10-CM

## 2024-11-26 DIAGNOSIS — R53.81 PHYSICAL DECONDITIONING: ICD-10-CM

## 2024-11-26 DIAGNOSIS — R26.89 BALANCE DISORDER: ICD-10-CM

## 2024-11-26 DIAGNOSIS — M79.671 PAIN IN BOTH FEET: ICD-10-CM

## 2024-11-26 DIAGNOSIS — M79.672 PAIN IN BOTH FEET: ICD-10-CM

## 2024-11-26 PROCEDURE — 97162 PT EVAL MOD COMPLEX 30 MIN: CPT | Mod: GP | Performed by: PHYSICAL THERAPIST

## 2024-11-26 PROCEDURE — 97112 NEUROMUSCULAR REEDUCATION: CPT | Mod: GP | Performed by: PHYSICAL THERAPIST

## 2024-11-26 ASSESSMENT — ENCOUNTER SYMPTOMS
DEPRESSION: 0
LOSS OF SENSATION IN FEET: 1
OCCASIONAL FEELINGS OF UNSTEADINESS: 1

## 2024-11-26 NOTE — PROGRESS NOTES
Physical Therapy    Physical Therapy Evaluation and Treatment    Patient Name: Kerry Arevalo  MRN: [id number]  Today's Date: 12/2/2024  Time Calculation  Start Time: 0830  Stop Time: 0930  Time Calculation (min): 60 min  PT Evaluation Time Entry  PT Evaluation (Moderate) Time Entry: 35  PT Therapeutic Procedures Time Entry  Neuromuscular Re-Education Time Entry: 25          Visit Number: 1   Insurance:  Payor: [facility] MEDICARE / Plan: UNITED [employer] MEDICARE / Product Type: *No Product type* /   Plan of Care: 1-2x a week for 6 weeks  Authorization: Pending  Primary Diagnosis: Impaired gait and mobility    Assessment:   Kerry Arevalo  is a 84 y.o. old patient who participated in a physical therapy evaluation today due to physical deconditioning, impaired B LE strength and impaired gait and mobility.  Patient with a past medical history including neuropathy, anemia, asthma, OA, remote L foot fracture, BLESS syndrome.  Patient presents with Significant B LE weakness distal > proximal and B foot neuropathy causing sensory, proprioceptive deficits of B feet, contribute to balance and gait deficits, as well as falls risk.  Due to these impairments, she has the following functional limitations and participation restrictions:  Patient ambulates with rollator v SPC and does not go out of the house without assistance/supervision.  Patient is able to bath and dress self and ambulate in the home with rollator v SPC.  Patient may benefit from B ankle foot orthosis(AFO) assessment depending on progress in strengthening and gait training.  Patient would benefit from skilled physical therapy services to improve above mentioned impairments, allow for independent and safe functional mobility, attain her therapy-related goals, and establish home program. The patient verbalized understanding and is in agreement with all goals and plan of care. Plan of care was developed with input and agreement by the patient.    Plan:    OP PT Plan  Treatment/Interventions: Education/ Instruction, Gait training, Neuromuscular re-education, Orthosis fit/ training, Self care/ home management, Therapeutic activities, Therapeutic exercises  PT Plan: Skilled PT  PT Frequency: 2 times per week  Duration: 6 weeks  Onset Date: 10/09/24  Certification Period Start Date: 11/26/24  Certification Period End Date: 02/24/25  Number of Treatments Authorized: auth pending  Rehab Potential: Good  Plan of Care Agreement: Patient    Current Problem:  Problem List Items Addressed This Visit             ICD-10-CM    Neuropathy G62.9    Relevant Orders    Follow Up In Physical Therapy    Pain in both feet M79.671, M79.672    Relevant Orders    Follow Up In Physical Therapy    Physical deconditioning R53.81    Relevant Orders    Follow Up In Physical Therapy    Balance disorder R26.89    Relevant Orders    Follow Up In Physical Therapy    Impaired gait and mobility - Primary R26.89    Relevant Orders    Follow Up In Physical Therapy         Subjective   Kerry Arevalo  is a 84 y.o. female  presenting to the clinic with chief complaint of B foot pain, imbalance and deconditioning.    CURRENT LEVEL OF FUNCTION: Lives alone, independent with bathing and dressing, family cooks meals for patient to heat up.  CURRENT DME:  cane, rollator      SOCIAL HISTORY/LIVING ARRANGEMENT:   Patient lives alone in a first floor unit of multifamily home, with daughter living upstairs with 3 WILFRED + 2 HRs  No steps to reach bed/bathroom    ACTIVITY/PARTICIPATION LIMITATIONS AND RESTRICTIONS: Patient does not drive or do much cooking.      EXERCISE/ACTIVITY LEVEL:  sedentary/careful    PATIENT GOAL: I want to get stronger and walk better if I can.     General:     Payor: "[employer] MEDICARE / Plan: UNITED HEALTHCARE MEDICARE / Product Type: *No Product type* /   Ivy Brown  STEADI Fall Risk Score (The score of 4 or more indicates an increased risk  of falling): 9   High falls risk based on STEADI score, B LE weakness, impairment of balance and falls history in past 6 months.    Pain   Patient reports B foot pain (L>/R).  Not rated.    Objective   MUSCULOSKELETAL SYSTEM:  GROSS POSTURE:  stand - good, erect stance.  PASSIVE RANGE OF MOTION: WFL BL LE  LE STRENGTH - tested in seated:  Muscle Right LE Left LE   Hip Flexion (L1-L2) 4+/5 4+/5   Hip Extension 4+/5 4+/5   Hip Abduction 4/5 4/5   Hip Adduction 4+/5 4+/5   Knee Flexion (L3) 3+/5 3+/5   Knee Extension (L4) 4+/5 4+/5   Ankle Dorsiflexion (L5) 2+/5 2+/5   Ankle Plantarflexion (S1) 3+/5 3+/5     Ankle Inversion    2+/5   2+/5    Ankle Eversion    2+/5   2+/5    NEUROMUSCULAR SYSTEM:   OBSERVATION: No Tremor noted  GROSS SENSATION: impaired to light touch R/L distal to mid shin  TONE: Hypotonic B LEs distal to knee  COORDINATION:    STACEY: normal R/L   HTS: delayed and dysmetric R/L in sitting  PROPRIOCEPTION: Impaired R/L at ankle  TRANSFERS:       SIT <> STAND:  Modified Independent with UE assist  BALANCE:       4 Stage Balance Test:  Narrow Base of Support: 2 sec  Semi-Tandem: 0 sec  Tandem: 0 sec  SLS R: 0 sec  SLS L: 0 sec  GAIT:  Patient ambulated 20 feet with rollator at supervision level. Patient demonstrated slow vincent, decreased step length, decreased step height, wide KULDEEP, decreased trunk rotation with rollator and, decreased toe-off at terminal stance, decreased heel strike at initial contact.  STAIRS: TBA      Outcome Measures:  ABC - 1120 (76%) with rollator  TUG - 25.9 sec with rollator, 22.5 sec with SPC with close supervision  Tinetti Balance - 7/16  Tinetti Gait - 8/12 with rollator    Treatments:  NEUROMUSCULAR REEDUCATION x 25 minutes  Provided manual facilitation for correct timing and accuracy of postural responses as needed during functional mobility and assessment.   Patient entered and exited clinic ambulating with rollator and supervision 50' x 2.   Initiated B PF stretches in  "standing for HEP    EDUCATION:   Educated patient on purpose and benefits of neurologic physical therapy for diagnosis along with results of examination findings and how this correlates to their chief complaint  Educated patient on purpose of exercises and importance of regular daily home program compliance  Educated patient on multi-factorial nature of balance systems to maintain balance  Provided patient with visual, verbal, and written instruction via printed home program handout to ensure carryover     Goals:  Active       PT Problem       Balance       Start:  12/02/24    Expected End:  02/24/25       Patient will demonstrate improved safety and independence during standing household activities as evidenced by Tinetti Balance score of at least 12/16 with or without AFOs.         Gait       Start:  12/02/24    Expected End:  02/24/25       Patient will demonstrate improved safety and independence during household and community ambulation with appropriate assistive device as evidenced by Tinetti Gait score of 9/12 (ceiling score with device), and TUG time of 20 sec or better.         LE strength/motor control       Start:  12/02/24    Expected End:  02/24/25       Patient will maximize B LE strength, balance and safety by demonstrating independence and compliance with HEP as assigned.         AFO assessment       Start:  12/02/24    Expected End:  02/24/25       Patient will receive B AFO assessment as indicated by clinical performance and physician referral due to B ankle mm weakness and sensory loss contributing to impaired balance and control of B feet during gait.         Patient Stated Goal 1       Start:  12/02/24    Expected End:  02/24/25       \"I want to get stronger and walk better if I can.\"              "

## 2024-12-04 ENCOUNTER — APPOINTMENT (OUTPATIENT)
Dept: OPHTHALMOLOGY | Facility: CLINIC | Age: 84
End: 2024-12-04
Payer: MEDICARE

## 2024-12-13 ENCOUNTER — TELEPHONE (OUTPATIENT)
Dept: PRIMARY CARE | Facility: CLINIC | Age: 84
End: 2024-12-13
Payer: MEDICARE

## 2024-12-16 ENCOUNTER — APPOINTMENT (OUTPATIENT)
Dept: PRIMARY CARE | Facility: CLINIC | Age: 84
End: 2024-12-16
Payer: MEDICARE

## 2024-12-16 VITALS — SYSTOLIC BLOOD PRESSURE: 158 MMHG | DIASTOLIC BLOOD PRESSURE: 88 MMHG | WEIGHT: 106 LBS | BODY MASS INDEX: 24.64 KG/M2

## 2024-12-16 DIAGNOSIS — M79.672 PAIN IN BOTH FEET: ICD-10-CM

## 2024-12-16 DIAGNOSIS — M06.9 RHEUMATOID ARTHRITIS, INVOLVING UNSPECIFIED SITE, UNSPECIFIED WHETHER RHEUMATOID FACTOR PRESENT (MULTI): ICD-10-CM

## 2024-12-16 DIAGNOSIS — E78.00 ELEVATED SERUM CHOLESTEROL: ICD-10-CM

## 2024-12-16 DIAGNOSIS — J30.2 SEASONAL ALLERGIES: Primary | ICD-10-CM

## 2024-12-16 DIAGNOSIS — M19.90 OSTEOARTHRITIS, UNSPECIFIED OSTEOARTHRITIS TYPE, UNSPECIFIED SITE: ICD-10-CM

## 2024-12-16 DIAGNOSIS — I10 HYPERTENSION, UNSPECIFIED TYPE: ICD-10-CM

## 2024-12-16 DIAGNOSIS — S06.4XAA EPIDURAL HEMATOMA (MULTI): ICD-10-CM

## 2024-12-16 DIAGNOSIS — M85.80 OSTEOPENIA, UNSPECIFIED LOCATION: ICD-10-CM

## 2024-12-16 DIAGNOSIS — T50.905A DRESS SYNDROME: ICD-10-CM

## 2024-12-16 DIAGNOSIS — M79.671 PAIN IN BOTH FEET: ICD-10-CM

## 2024-12-16 DIAGNOSIS — D72.12 DRESS SYNDROME: ICD-10-CM

## 2024-12-16 DIAGNOSIS — E11.9 TYPE 2 DIABETES MELLITUS WITHOUT COMPLICATION, WITHOUT LONG-TERM CURRENT USE OF INSULIN (MULTI): ICD-10-CM

## 2024-12-16 PROBLEM — M54.16 LUMBAR RADICULOPATHY: Status: ACTIVE | Noted: 2024-12-16

## 2024-12-16 PROBLEM — M79.673 PAIN OF FOOT: Status: ACTIVE | Noted: 2024-12-16

## 2024-12-16 PROBLEM — H26.492 POSTERIOR CAPSULAR OPACIFICATION, LEFT: Status: ACTIVE | Noted: 2024-01-05

## 2024-12-16 PROCEDURE — G2211 COMPLEX E/M VISIT ADD ON: HCPCS | Performed by: INTERNAL MEDICINE

## 2024-12-16 PROCEDURE — 1123F ACP DISCUSS/DSCN MKR DOCD: CPT | Performed by: INTERNAL MEDICINE

## 2024-12-16 PROCEDURE — 1159F MED LIST DOCD IN RCRD: CPT | Performed by: INTERNAL MEDICINE

## 2024-12-16 PROCEDURE — 3079F DIAST BP 80-89 MM HG: CPT | Performed by: INTERNAL MEDICINE

## 2024-12-16 PROCEDURE — 3077F SYST BP >= 140 MM HG: CPT | Performed by: INTERNAL MEDICINE

## 2024-12-16 PROCEDURE — 1158F ADVNC CARE PLAN TLK DOCD: CPT | Performed by: INTERNAL MEDICINE

## 2024-12-16 PROCEDURE — 99214 OFFICE O/P EST MOD 30 MIN: CPT | Performed by: INTERNAL MEDICINE

## 2024-12-16 RX ORDER — IBUPROFEN 200 MG
1 CAPSULE ORAL DAILY
Qty: 90 TABLET | Refills: 3 | Status: SHIPPED | OUTPATIENT
Start: 2024-12-16

## 2024-12-16 RX ORDER — LOSARTAN POTASSIUM 50 MG/1
50 TABLET ORAL DAILY
Qty: 90 TABLET | Refills: 1 | Status: SHIPPED | OUTPATIENT
Start: 2024-12-16

## 2024-12-16 RX ORDER — CYCLOBENZAPRINE HCL 5 MG
5 TABLET ORAL 3 TIMES DAILY PRN
Qty: 30 TABLET | Refills: 2 | Status: SHIPPED | OUTPATIENT
Start: 2024-12-16 | End: 2025-02-14

## 2024-12-16 RX ORDER — MONTELUKAST SODIUM 10 MG/1
10 TABLET ORAL DAILY
Qty: 90 TABLET | Refills: 3 | Status: SHIPPED | OUTPATIENT
Start: 2024-12-16

## 2024-12-16 RX ORDER — PRAVASTATIN SODIUM 40 MG/1
40 TABLET ORAL NIGHTLY
Qty: 90 TABLET | Refills: 1 | Status: SHIPPED | OUTPATIENT
Start: 2024-12-16

## 2024-12-16 RX ORDER — DICLOFENAC SODIUM 10 MG/G
4 GEL TOPICAL 4 TIMES DAILY PRN
Qty: 200 G | Refills: 3 | Status: SHIPPED | OUTPATIENT
Start: 2024-12-16

## 2024-12-16 RX ORDER — AZELASTINE 1 MG/ML
2 SPRAY, METERED NASAL 2 TIMES DAILY
Qty: 30 ML | Refills: 2 | Status: SHIPPED | OUTPATIENT
Start: 2024-12-16

## 2024-12-16 RX ORDER — DEXTROMETHORPHAN HYDROBROMIDE, GUAIFENESIN 5; 100 MG/5ML; MG/5ML
650 LIQUID ORAL EVERY 8 HOURS
Qty: 180 TABLET | Refills: 2 | Status: SHIPPED | OUTPATIENT
Start: 2024-12-16 | End: 2025-06-14

## 2024-12-16 ASSESSMENT — ENCOUNTER SYMPTOMS
LOSS OF SENSATION IN FEET: 1
DIARRHEA: 0
ARTHRALGIAS: 1
OCCASIONAL FEELINGS OF UNSTEADINESS: 0
ABDOMINAL PAIN: 0
VOMITING: 0
COUGH: 0
DEPRESSION: 0
HEADACHES: 0
CHEST TIGHTNESS: 0
NAUSEA: 0
SHORTNESS OF BREATH: 0

## 2024-12-16 ASSESSMENT — PATIENT HEALTH QUESTIONNAIRE - PHQ9
2. FEELING DOWN, DEPRESSED OR HOPELESS: NOT AT ALL
1. LITTLE INTEREST OR PLEASURE IN DOING THINGS: NOT AT ALL
SUM OF ALL RESPONSES TO PHQ9 QUESTIONS 1 AND 2: 0

## 2024-12-16 NOTE — ASSESSMENT & PLAN NOTE
Initial bp 180/90, repeat 158/88. No BP checks at home. Acceptable BP.   Continue losartan 50 daily for now.

## 2024-12-16 NOTE — ASSESSMENT & PLAN NOTE
Rheum notes reviewed, suspected DRESS from sulfasalazine. However, will avoid celebrex for now.   She will finish prednisone taper in 1-2 months as ordered by derm, follow up in 1/2025.

## 2024-12-16 NOTE — PATIENT INSTRUCTIONS

## 2024-12-16 NOTE — ASSESSMENT & PLAN NOTE
Pt will hold off on another rheum referral for now, though appears interested in a referral in the future.   Is on prednisone but will likely need continued DMARD therapy in future.   Will avoid celebrex for now due to hx DRESS on sulfasalazine.     Pt informed to schedule tylenol PO TID, take flexeril prn, try voltaren cream.   Reeval next visit.

## 2024-12-16 NOTE — PROGRESS NOTES
"Subjective   Patient ID: Kerry Arevalo \"Kerry Arevalo\" is a 84 y.o. female who presents for Follow-up (Placard).    84f with rheumatoid arthritis, hx DRESS, hx epidural hematoma 7/2024, seasonal allergies, HTN, HLD, chronic back pain, hx asthma, T2DM who presents for a follow up visit.   Her main complaint today are pain in feet and legs, has been using celebrex, taking for 3y. haven't stopped. Celebrex helps for a while. She was instructed to hold off on it due to recent history of DRESS thought to be due to sulfasalazine this year. Tried creams for it before.   Getting home care tight now. Going to outpatient PT for chronic pain and neurologic recovery near 12/30/24. Had a cough with a blood streak once. No current cough, CP, abd pain.   Initial bp 180/90, repeat 158/88. No BP checks at home.   She initially asked for a \"list\" of coworkers, clarifying \"arthritis doctors\"; when said she can be given a referral, she stated she would not want to see a rheumatologist currently due to all the things she has been through, and wants to hold off on further referrals for now.            Review of Systems   Respiratory:  Negative for cough, chest tightness and shortness of breath.    Cardiovascular:  Negative for chest pain.   Gastrointestinal:  Negative for abdominal pain, diarrhea, nausea and vomiting.   Musculoskeletal:  Positive for arthralgias.   Neurological:  Negative for headaches.   All other systems reviewed and are negative.      Objective   /88 (BP Location: Left arm, Patient Position: Sitting, BP Cuff Size: Adult)   Wt 48.1 kg (106 lb)   BMI 24.64 kg/m²     Physical Exam  Vitals reviewed.   Constitutional:       General: She is not in acute distress.     Comments: Room air  Walking with a walker     HENT:      Head: Normocephalic and atraumatic.   Eyes:      Extraocular Movements: Extraocular movements intact.      Conjunctiva/sclera: Conjunctivae normal.   Cardiovascular:      Rate and Rhythm: Normal " rate and regular rhythm.      Heart sounds: No murmur heard.     No friction rub. No gallop.   Pulmonary:      Effort: Pulmonary effort is normal.      Breath sounds: Normal breath sounds. No wheezing, rhonchi or rales.   Abdominal:      General: Bowel sounds are normal.      Palpations: Abdomen is soft. There is no mass.      Tenderness: There is no abdominal tenderness. There is no guarding or rebound.   Musculoskeletal:         General: No tenderness.      Cervical back: Neck supple.      Comments: Minimal L>R nonpitting edema. Pt reports hx of old injury to L ankle.    Skin:     General: Skin is warm and dry.      Findings: No bruising or rash.   Neurological:      Mental Status: She is alert. Mental status is at baseline.      Comments: Answering questions, following commands. Moving all extremities.    Psychiatric:         Mood and Affect: Mood and affect normal.         Assessment/Plan   Problem List Items Addressed This Visit       Elevated serum cholesterol    Relevant Medications    pravastatin (Pravachol) 40 mg tablet    Pain in both feet     Consider CBD products / provider.          Relevant Orders    Disability Placard    Rheumatoid arthritis     Pt will hold off on another rheum referral for now, though appears interested in a referral in the future.   Is on prednisone but will likely need continued DMARD therapy in future.   Will avoid celebrex for now due to hx DRESS on sulfasalazine.     Pt informed to schedule tylenol PO TID, take flexeril prn, try voltaren cream.   Reeval next visit.          Relevant Orders    Disability Placard    Hypertension     Initial bp 180/90, repeat 158/88. No BP checks at home. Acceptable BP.   Continue losartan 50 daily for now.            Relevant Medications    losartan (Cozaar) 50 mg tablet    montelukast (Singulair) 10 mg tablet    DRESS syndrome     Rheum notes reviewed, suspected DRESS from sulfasalazine. However, will avoid celebrex for now.   She will finish  prednisone taper in 1-2 months as ordered by derm, follow up in 1/2025.            Epidural hematoma (Multi)     Reviewed last head CT. She is walking well with a walker today, going to outpatient PT soon.            Relevant Orders    Disability Placard    Type 2 diabetes mellitus without complications (Multi)     Reviewed recent hb A1c which has been stable with diet control.   Recheck hb A1c next visit.            Seasonal allergies - Primary    Relevant Medications    azelastine (Astelin) 137 mcg (0.1 %) nasal spray    Osteopenia    Relevant Medications    calcium citrate (Calcitrate) 200 mg (950 mg) tablet    Osteoarthritis    Relevant Medications    diclofenac sodium (Voltaren) 1 % gel    cyclobenzaprine (Flexeril) 5 mg tablet    acetaminophen (Tylenol 8 Hour) 650 mg ER tablet     #health Maintenance  Colonoscopy - 2/2013 last.   Mammogram - last 3/2023. Can consider stopping due to age.   DEXA - 2/2023 showed osteopenia.   PAP - hx hysterectomy   Labs: none this visit   Immunizations: Shingles vaccine 1/2014, pneumonia vaccine conjugate prevnar 13 3/2016, flu shot can get this season, tetanus vaccine 4/2014 can get now, RSV 12/2024        Patient was identified as a fall risk. Risk prevention instructions provided.

## 2024-12-31 ENCOUNTER — TREATMENT (OUTPATIENT)
Dept: PHYSICAL THERAPY | Facility: CLINIC | Age: 84
End: 2024-12-31
Payer: MEDICARE

## 2024-12-31 DIAGNOSIS — R26.89 IMPAIRED GAIT AND MOBILITY: ICD-10-CM

## 2024-12-31 DIAGNOSIS — R26.89 BALANCE DISORDER: ICD-10-CM

## 2024-12-31 DIAGNOSIS — M79.672 PAIN IN BOTH FEET: ICD-10-CM

## 2024-12-31 DIAGNOSIS — M79.671 PAIN IN BOTH FEET: ICD-10-CM

## 2024-12-31 DIAGNOSIS — G62.9 NEUROPATHY: ICD-10-CM

## 2024-12-31 DIAGNOSIS — R53.81 PHYSICAL DECONDITIONING: Primary | ICD-10-CM

## 2024-12-31 PROCEDURE — 97112 NEUROMUSCULAR REEDUCATION: CPT | Mod: GP | Performed by: PHYSICAL THERAPIST

## 2024-12-31 NOTE — PROGRESS NOTES
Physical Therapy    Physical Therapy Treatment    Patient Name: Kerry Arevalo  MRN: [id number]  Today's Date: 1/2/2025        PT Therapeutic Procedures Time Entry  Neuromuscular Re-Education Time Entry: 50       Visit number: 2  Insurance: Payor: [employer] MEDICARE / Plan: UNITED [employer] MEDICARE / Product Type: *No Product type* /   Plan of Care Dates: 11/26/2024 - 2/24/2025  Authorized visits: 12  Authorization end date - 1/21/2025  Primary diagnosis: Balance disorder, Impaired gait and mobility.      Assessment:   Patient presents for first follow-up PT session this date. Patient tolerated all therapeutic activities well with supervision and instruction.   Home exercises were initiated and reviewed.  Patient required moderate cuing with diagrams to ensure proper form and technique. Patient would continue to benefit from skilled PT to continue to improve strength, balance, gait, and overall functional mobility.    Plan: Continue per plan of care to improve functional strength and balance.        Current Problem  Problem List Items Addressed This Visit             ICD-10-CM    Neuropathy G62.9    Pain in both feet M79.671, M79.672    Physical deconditioning - Primary R53.81    Balance disorder R26.89    Impaired gait and mobility R26.89         Subjective    Patient states she is doing well with decreased foot pain, but continuing LE pain.    Precautions:   Precautions  STEADI Fall Risk Score (The score of 4 or more indicates an increased risk of falling): 9 on Evaluation.    Pain   LE pain and fatigue - not rated.  Tolerated session without increase of pain levels.    Objective   Outcome Measures:  None assessed this date    Treatments:  NEUROMUSCULAR REEDUCATION x 50 minutes  Provided manual facilitation for correct timing and accuracy of postural responses as needed during functional mobility.  Patient entered and exited clinic ambulating with rolling walker and supervision 50' x 2.   HEP review:   -  "Patient had forgotten to perform PF stretches and had forgotten to bring in exercises from previous PT.   - performing LAQs, standing heel lifts, and purposeful walking with rollator in driveway or house regularly.   - reassigned standing calf stretch (Runner's) at counter/rollator - 30 count 2x each   New exercises:  continue at counter or with locked rollator:   - MIP - 2x10   - heel raises 2x10   - HS curls 2x10 each   - mini squats - 2x10.    OP EDUCATION:   Instructed, demonstrated and practiced HEP.  Given diagrams for home.   - as assigned above:  [employer] access code # [email or web address]    Goals:  Active       PT Problem       Balance       Start:  12/02/24    Expected End:  02/24/25       Patient will demonstrate improved safety and independence during standing household activities as evidenced by Tinetti Balance score of at least 12/16 with or without AFOs.         Gait       Start:  12/02/24    Expected End:  02/24/25       Patient will demonstrate improved safety and independence during household and community ambulation with appropriate assistive device as evidenced by Tinetti Gait score of 9/12 (ceiling score with device), and TUG time of 20 sec or better.         LE strength/motor control       Start:  12/02/24    Expected End:  02/24/25       Patient will maximize B LE strength, balance and safety by demonstrating independence and compliance with HEP as assigned.         AFO assessment       Start:  12/02/24    Expected End:  02/24/25       Patient will receive B AFO assessment as indicated by clinical performance and physician referral due to B ankle mm weakness and sensory loss contributing to impaired balance and control of B feet during gait.         Patient Stated Goal 1       Start:  12/02/24    Expected End:  02/24/25       \"I want to get stronger and walk better if I can.\"            "

## 2025-01-06 ENCOUNTER — TREATMENT (OUTPATIENT)
Dept: PHYSICAL THERAPY | Facility: CLINIC | Age: 85
End: 2025-01-06
Payer: MEDICARE

## 2025-01-06 DIAGNOSIS — M79.671 PAIN IN BOTH FEET: ICD-10-CM

## 2025-01-06 DIAGNOSIS — R26.89 BALANCE DISORDER: ICD-10-CM

## 2025-01-06 DIAGNOSIS — M79.672 PAIN IN BOTH FEET: ICD-10-CM

## 2025-01-06 DIAGNOSIS — R53.81 PHYSICAL DECONDITIONING: ICD-10-CM

## 2025-01-06 DIAGNOSIS — G62.9 NEUROPATHY: ICD-10-CM

## 2025-01-06 DIAGNOSIS — R26.89 IMPAIRED GAIT AND MOBILITY: ICD-10-CM

## 2025-01-06 PROCEDURE — 97112 NEUROMUSCULAR REEDUCATION: CPT | Mod: GP | Performed by: PHYSICAL THERAPIST

## 2025-01-06 NOTE — PROGRESS NOTES
Physical Therapy    Physical Therapy Treatment    Patient Name: Kerry Arevalo  MRN: [id number]  Today's Date: 1/7/2025        PT Therapeutic Procedures Time Entry  Neuromuscular Re-Education Time Entry: 45       Visit number: 3  Insurance: Payor: [employer] / Plan: [employer] MEDICARE / Product Type: *No Product type* /   Plan of Care Dates: 11/26/2024 - 2/24/2025  Authorized visits: 12  Authorization end date - 1/21/2025  Primary diagnosis: Balance disorder, Impaired gait and mobility.      Assessment:   Patient presents for follow-up PT session this date. Patient remembered to follow assigned HEP and bring previously assigned HEP to clinic with her today.  Patient tolerated all therapeutic activities well with supervision and instruction.   Home exercises were demonstrated with good recall and technique.  Patient required minimal cuing  and correction to ensure proper form and technique. Patient would continue to benefit from skilled PT to continue to improve strength, balance, gait, and overall functional mobility.    Plan: Continue per plan of care to improve functional strength and balance.        Current Problem  Problem List Items Addressed This Visit             ICD-10-CM    Neuropathy G62.9    Pain in both feet M79.671, M79.672    Physical deconditioning R53.81    Balance disorder R26.89    Impaired gait and mobility R26.89         Subjective    Patient states she is doing well with decreased foot pain, but continuing LE pain.    Precautions:   Precautions  STEADI Fall Risk Score (The score of 4 or more indicates an increased risk of falling): 9 on Evaluation.    Pain   LE pain and fatigue - not rated.  Tolerated session without increase of pain levels.    Objective   Outcome Measures:  None assessed this date    Treatments:  NEUROMUSCULAR REEDUCATION x 45 minutes  Provided manual facilitation for correct timing and accuracy of postural responses as needed during functional  "mobility.  Patient entered and exited clinic ambulating with rolling walker and supervision 50' x 2.   HEP review:   - Demonstrates all exercises 10 reps with good technique.   - reviewed previous HEP - continue with 2 handed  on ball - shoulder press,  chest press, shoulder flexion to 90.   - reassigned standing calf stretch (Runner's) at counter/rollator - 30 count 2x each - shorter stride to allow forward lean keeping heel on the floor.  New exercises:  continue at counter or with locked rollator:   - MIP - 2x10   - heel raises 2x10   - HS curls 2x10 each   - mini squats - 2x10.      OP EDUCATION:   Instructed, demonstrated and practiced HEP.  Given diagrams for home.   - as assigned above:  [employer] access code # [email or web address]    Goals:  Active       PT Problem       Balance       Start:  12/02/24    Expected End:  02/24/25       Patient will demonstrate improved safety and independence during standing household activities as evidenced by Tinetti Balance score of at least 12/16 with or without AFOs.         Gait       Start:  12/02/24    Expected End:  02/24/25       Patient will demonstrate improved safety and independence during household and community ambulation with appropriate assistive device as evidenced by Tinetti Gait score of 9/12 (ceiling score with device), and TUG time of 20 sec or better.         LE strength/motor control       Start:  12/02/24    Expected End:  02/24/25       Patient will maximize B LE strength, balance and safety by demonstrating independence and compliance with HEP as assigned.         AFO assessment       Start:  12/02/24    Expected End:  02/24/25       Patient will receive B AFO assessment as indicated by clinical performance and physician referral due to B ankle mm weakness and sensory loss contributing to impaired balance and control of B feet during gait.         Patient Stated Goal 1       Start:  12/02/24    Expected End:  02/24/25       \"I want to get stronger and walk " "better if I can.\"            "

## 2025-01-13 ENCOUNTER — TREATMENT (OUTPATIENT)
Dept: PHYSICAL THERAPY | Facility: CLINIC | Age: 85
End: 2025-01-13
Payer: MEDICARE

## 2025-01-13 DIAGNOSIS — M79.671 PAIN IN BOTH FEET: ICD-10-CM

## 2025-01-13 DIAGNOSIS — R26.89 BALANCE DISORDER: ICD-10-CM

## 2025-01-13 DIAGNOSIS — G62.9 NEUROPATHY: ICD-10-CM

## 2025-01-13 DIAGNOSIS — R26.89 IMPAIRED GAIT AND MOBILITY: ICD-10-CM

## 2025-01-13 DIAGNOSIS — R53.81 PHYSICAL DECONDITIONING: ICD-10-CM

## 2025-01-13 DIAGNOSIS — M79.672 PAIN IN BOTH FEET: ICD-10-CM

## 2025-01-13 PROCEDURE — 97116 GAIT TRAINING THERAPY: CPT | Mod: GP | Performed by: PHYSICAL THERAPIST

## 2025-01-13 PROCEDURE — 97112 NEUROMUSCULAR REEDUCATION: CPT | Mod: GP | Performed by: PHYSICAL THERAPIST

## 2025-01-13 NOTE — PROGRESS NOTES
Physical Therapy    Physical Therapy Treatment    Patient Name: Kerry Arevalo  MRN: [id number]  Today's Date: 1/13/2025  Time Calculation  Start Time: 0805  Stop Time: 0845  Time Calculation (min): 40 min     PT Therapeutic Procedures Time Entry  Neuromuscular Re-Education Time Entry: 30  Gait Training Time Entry: 10       Visit number: 4  Insurance: Payor: [employer] / Plan: [employer] / Product Type: *No Product type* /   Plan of Care Dates: 11/26/2024 - 2/24/2025  Authorized visits: 12  Authorization end date - 1/21/2025  Primary diagnosis: Balance disorder, Impaired gait and mobility.    Assessment:   Patient presents for follow-up PT session this date.  Patient remembered to follow assigned HEP and bring previously assigned HEP to clinic with her today.  Patient tolerated all therapeutic activities well with supervision and instruction.   Home exercises were demonstrated with good recall and technique.  Patient required minimal cuing  and correction to ensure proper form and technique. Patient would continue to benefit from skilled PT to continue to improve strength, balance, gait, and overall functional mobility.    Plan: Continue per plan of care to improve functional strength and balance.        Current Problem  Problem List Items Addressed This Visit             ICD-10-CM    Neuropathy G62.9    Pain in both feet M79.671, M79.672    Physical deconditioning R53.81    Balance disorder R26.89    Impaired gait and mobility R26.89         Subjective    Patient states she is doing well, following HEP each day, sometimes 2x a day.  No falls    Precautions:   Precautions  STEADI Fall Risk Score (The score of 4 or more indicates an increased risk of falling): 9 on Evaluation.    Pain   B thigh pain/she thinks its muscles being worked, not further injury - not rated.  Tolerated session without increase of pain levels.    Objective   Outcome Measures:  None assessed this  date    Treatments:  NEUROMUSCULAR REEDUCATION x 30 minutes  Provided manual facilitation for correct timing and accuracy of postural responses as needed during functional mobility.  HEP review: continue as assigned   - Patient brought exercise diagrams with her.   - reports no problems with exercises and performing at least 1x a day.  Demonstrated in clinic today:   - heel raises 2x10   - mini squats - 2x10.   - Toe raises leaning on posterior wall   - seated ankle pumps (90/90)   - seated heel prop with knees extended - ankle pumps  Supine   -  bridging, butterfly - 10x   - SLR - 10x each   - sidelying SLR - 10x each    GAIT TRAINING: 10 minutes  Patient entered and exited clinic ambulating with rollator and supervision 50' x 2.    - Gait with rollator - 400' x 1  Gait in parallel bars:   - 1 hand - 10'x8 each  Gait with SPC:   - 10'x8 with cane in R hand   - as above overground - 20' x 2      OP EDUCATION:   Instructed, demonstrated and practiced HEP.  Given diagrams for home.  [employer] access code # [email or web address]  Include:  -  2 handed  on ball - shoulder press,  chest press, shoulder flexion to 90.  - standing calf stretch (Runner's) at counter/rollator - 30 count 2x each - MIP - 2x10   - heel raises 2x10   - HS curls 2x10 each   - mini squats - 2x10.    Goals:  Active       PT Problem       Balance       Start:  12/02/24    Expected End:  02/24/25       Patient will demonstrate improved safety and independence during standing household activities as evidenced by Tinetti Balance score of at least 12/16 with or without AFOs.         Gait       Start:  12/02/24    Expected End:  02/24/25       Patient will demonstrate improved safety and independence during household and community ambulation with appropriate assistive device as evidenced by Tinetti Gait score of 9/12 (ceiling score with device), and TUG time of 20 sec or better.         LE strength/motor control       Start:  12/02/24    Expected End:  02/24/25    "    Patient will maximize B LE strength, balance and safety by demonstrating independence and compliance with HEP as assigned.         AFO assessment       Start:  12/02/24    Expected End:  02/24/25       Patient will receive B AFO assessment as indicated by clinical performance and physician referral due to B ankle mm weakness and sensory loss contributing to impaired balance and control of B feet during gait.         Patient Stated Goal 1       Start:  12/02/24    Expected End:  02/24/25       \"I want to get stronger and walk better if I can.\"            "

## 2025-01-15 ENCOUNTER — APPOINTMENT (OUTPATIENT)
Dept: DERMATOLOGY | Facility: CLINIC | Age: 85
End: 2025-01-15
Payer: MEDICARE

## 2025-01-15 DIAGNOSIS — T50.905A DRUG REACTION WITH EOSINOPHILIA AND SYSTEMIC SYMPTOMS: ICD-10-CM

## 2025-01-15 DIAGNOSIS — L73.8 OTHER SPECIFIED FOLLICULAR DISORDERS: Primary | ICD-10-CM

## 2025-01-15 DIAGNOSIS — D72.10 DRUG REACTION WITH EOSINOPHILIA AND SYSTEMIC SYMPTOMS: ICD-10-CM

## 2025-01-15 PROCEDURE — 99214 OFFICE O/P EST MOD 30 MIN: CPT | Performed by: STUDENT IN AN ORGANIZED HEALTH CARE EDUCATION/TRAINING PROGRAM

## 2025-01-15 PROCEDURE — 1159F MED LIST DOCD IN RCRD: CPT | Performed by: STUDENT IN AN ORGANIZED HEALTH CARE EDUCATION/TRAINING PROGRAM

## 2025-01-15 PROCEDURE — G2211 COMPLEX E/M VISIT ADD ON: HCPCS | Performed by: STUDENT IN AN ORGANIZED HEALTH CARE EDUCATION/TRAINING PROGRAM

## 2025-01-15 RX ORDER — PREDNISONE 5 MG/1
5 TABLET ORAL EVERY OTHER DAY
Qty: 60 TABLET | Refills: 0 | Status: SHIPPED | OUTPATIENT
Start: 2025-01-15 | End: 2025-05-15

## 2025-01-15 RX ORDER — DOXYCYCLINE 100 MG/1
100 CAPSULE ORAL 2 TIMES DAILY
Qty: 180 CAPSULE | Refills: 0 | Status: SHIPPED | OUTPATIENT
Start: 2025-01-15 | End: 2025-04-15

## 2025-01-15 NOTE — PROGRESS NOTES
Subjective     Kerry Arevalo is a 85 y.o. female who presents for the following: Rash (Follow up for drug reaction involving skin. Patient has been using Fluocinolone on scalp nightly; however, she feels that there was no improvement and bumps have formed on forehead. She has also used clobetasol ointment in the past with no improvement. ).     Review of Systems:  No other skin or systemic complaints other than what is documented elsewhere in the note.    The following portions of the chart were reviewed this encounter and updated as appropriate:          Skin Cancer History  No skin cancer on file.      Specialty Problems    None       Objective   Well appearing patient in no apparent distress; mood and affect are within normal limits.    A focused skin examination was performed. All findings within normal limits unless otherwise noted below.    Assessment/Plan   1. Other specified follicular disorders  Scalp    doxycycline (Monodox) 100 mg capsule - Scalp  Take 1 capsule (100 mg) by mouth 2 times a day. Take with at least 8 ounces (large glass) of water, do not lie down for 30 minutes after    2. 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   Discharged on steroids and triamcinolone, was doing well and stopped prednisone in July, now with severe erythrodermic flare  Labs normalized last month  Discussed that DRESS syndrome often requires slow long taper of topical steroids    Written instruction provided to patient and daughter  S/p 4 weeks of 5 mg prednisone  Start 8 weeks 5  mg every other day prednisone    Vaseline and Cerave Itch Relief to skin multiple times daily    For 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 Patient advised to take medication with non-dairy food and water, not lie flat for 30-45 minutes after taking medication and to practice sun protective behaviors. Patient to call should they experience side effect or concern with medication.     Follow in 2 months for continued management of this complex chronic condition          Related Procedures  Follow Up In Dermatology - Established Patient    Related Medications  clobetasol (Temovate) 0.05 % ointment  Apply topically 2 times a day. As needed to very itchy areas on trunk and arms    fluocinonide (Lidex) 0.05 % external solution  Apply topically 2 times a day. As needed for rash and itching on scalp    fluocinolone and shower cap 0.01 % oil  1 Application by scalp route once daily at bedtime.    predniSONE (Deltasone) 5 mg tablet  Take 1 tablet (5 mg) by mouth every other day. Start after completing two week course of prednisone 30 mg daily

## 2025-01-15 NOTE — PATIENT INSTRUCTIONS
James Payne,    We are going to change a few medications  Finish your current course of prednisone then switch to 5 mg every other day until we see each other again in 2 months  Start doxycycline 100 mg twice daily with food and water (this is an antibiotic that can help with your scalp)  Ok to stop the scalp oil. Restart if your scalp becomes unbearably itchy.  If you develop worsening rash, flaking, skin itching do not hesitate to contact us via [employer] or call [phone]

## 2025-01-20 ENCOUNTER — APPOINTMENT (OUTPATIENT)
Dept: PHYSICAL THERAPY | Facility: CLINIC | Age: 85
End: 2025-01-20
Payer: MEDICARE

## 2025-01-21 ENCOUNTER — TREATMENT (OUTPATIENT)
Dept: PHYSICAL THERAPY | Facility: CLINIC | Age: 85
End: 2025-01-21
Payer: MEDICARE

## 2025-01-21 ENCOUNTER — TELEPHONE (OUTPATIENT)
Dept: DERMATOLOGY | Facility: CLINIC | Age: 85
End: 2025-01-21

## 2025-01-21 DIAGNOSIS — R26.89 IMPAIRED GAIT AND MOBILITY: Primary | ICD-10-CM

## 2025-01-21 DIAGNOSIS — M79.672 PAIN IN BOTH FEET: ICD-10-CM

## 2025-01-21 DIAGNOSIS — R26.89 BALANCE DISORDER: ICD-10-CM

## 2025-01-21 DIAGNOSIS — G62.9 NEUROPATHY: ICD-10-CM

## 2025-01-21 DIAGNOSIS — R53.81 PHYSICAL DECONDITIONING: ICD-10-CM

## 2025-01-21 DIAGNOSIS — M79.671 PAIN IN BOTH FEET: ICD-10-CM

## 2025-01-21 PROCEDURE — 97112 NEUROMUSCULAR REEDUCATION: CPT | Mod: GP | Performed by: PHYSICAL THERAPIST

## 2025-01-21 PROCEDURE — 97116 GAIT TRAINING THERAPY: CPT | Mod: GP | Performed by: PHYSICAL THERAPIST

## 2025-01-21 NOTE — TELEPHONE ENCOUNTER
Spoke with patient regarding her concerns with hard substance built up in both ears. Patient was concerned that the Lidex solution that she previously applied to scalp at night may have hardened within ear canal. Instructed patient to try hydrogen peroxide drops for ears and to also let warm water while  in shower, run in ear canal.

## 2025-01-21 NOTE — PROGRESS NOTES
Physical Therapy    Physical Therapy Treatment    Patient Name: Kerry Arevalo  MRN: [id number]  Today's Date: 1/21/2025  Time Calculation  Start Time: 0800  Stop Time: 0845  Time Calculation (min): 45 min     PT Therapeutic Procedures Time Entry  Neuromuscular Re-Education Time Entry: 10  Gait Training Time Entry: 35       Visit number: 5  Insurance: Payor: [employer] / Plan: [employer] MEDICARE / Product Type: *No Product type* /   Plan of Care Dates: 11/26/2024 - 2/24/2025  Authorized visits: 12  Authorization end date - 1/21/2025  Primary diagnosis: Balance disorder, Impaired gait and mobility.    Assessment:   Patient presents for follow-up PT session this date.  Patient reports following HEP daily and has had no falls.  She feels she is working at an intensity that is helpful to her.  Patient tolerated all therapeutic activities well with supervision and instruction. Patient has improved  her walking technique and speed (Tinetti Gait - 9/12 and TUG time of 19.9 sec with rollator). and demonstrates improved confidence during functional mobility (ABC - Patient will continue to benefit from skilled PT to continue to improve strength, balance, gait, and overall functional mobility.    Plan: Continue per plan of care to improve functional strength and balance.        Current Problem  Problem List Items Addressed This Visit             ICD-10-CM    Neuropathy G62.9    Pain in both feet M79.671, M79.672    Physical deconditioning R53.81    Balance disorder R26.89    Impaired gait and mobility - Primary R26.89         Subjective    Patient states she is doing well, following HEP each day, sometimes 2x a day.  No falls    Precautions:     on Evaluation.    Pain   B thigh pain/she thinks its muscles being worked, not further injury - not rated.  Tolerated session without increase of pain levels.    Objective   Outcome Measures:      Today    On eval  ABC -     1230 (77%)   1120 (70%)  Tinetti  Gait   9 with rollator  8 with rollator  Tinetti Balance    without UE support   TUG -     15.9 sec   25.9 sec with rollator  TUG     16.2 with SPC   22.5 sec with SPC    Treatments:  NEUROMUSCULAR REEDUCATION x 10 minutes  Provided manual facilitation for correct timing and accuracy of postural responses as needed during functional mobility.  HEP review: continue as assigned   - reports no problems with exercises and performing at least 1x a day.  Demonstrated in clinic today:   - recumbent stepper - 2 minutes at 110 RPM for 199 total steps   - attempted recumbent LE ergometer - attempted, but unable to maintain correct LE alignment to make full revolutions of pedals.      GAIT TRAININ minutes  Patient entered and exited clinic ambulating with rollator and supervision 50' x 2.   TUG with rollator and with SPC  Tinetti Gait with rollator and with SPC  Tinetti Balance without UE support  - Gait with rollator - 400' x 1    Performed previously but on today (2025)  - heel raises 2x10   - mini squats - 2x10.   - Toe raises leaning on posterior wall   - seated ankle pumps (90/90)   - seated heel prop with knees extended - ankle pumps  Supine   -  bridging, butterfly - 10x   - SLR - 10x each   - sidelying SLR - 10x eac    OP EDUCATION:   Instructed, demonstrated and practiced HEP.  Given diagrams for home.  [employer] access code # [email or web address]  Include:  -  2 handed  on ball - shoulder press,  chest press, shoulder flexion to 90.  - standing calf stretch (Runner's) at counter/rollator - 30 count 2x each - MIP - 2x10   - heel raises 2x10   - HS curls 2x10 each   - mini squats - 2x10.    Goals:  Active       PT Problem       Balance (Not Progressing)       Start:  24    Expected End:  25       Patient will demonstrate improved safety and independence during standing household activities as evidenced by Tinetti Balance score of at least 12/16 with or without AFOs.      Goal Note        "LTG              Gait (Met)       Start:  12/02/24    Expected End:  02/24/25    Resolved:  01/21/25    Patient will demonstrate improved safety and independence during household and community ambulation with appropriate assistive device as evidenced by Tinetti Gait score of 9/12 (ceiling score with device), and TUG time of 20 sec or better.      Goal Note       LTG              LE strength/motor control (Met)       Start:  12/02/24    Expected End:  02/24/25    Resolved:  01/21/25    Patient will maximize B LE strength, balance and safety by demonstrating independence and compliance with HEP as assigned.      Goal Note       STG              AFO assessment (Met)       Start:  12/02/24    Expected End:  02/24/25    Resolved:  01/21/25    Patient will receive B AFO assessment as indicated by clinical performance and physician referral due to B ankle mm weakness and sensory loss contributing to impaired balance and control of B feet during gait.      Goal Note       STG              Patient Stated Goal 1 (Progressing)       Start:  12/02/24    Expected End:  02/24/25       \"I want to get stronger and walk better if I can.\"      Goal Note       LTG                 "

## 2025-01-23 ENCOUNTER — TELEPHONE (OUTPATIENT)
Dept: DERMATOLOGY | Facility: CLINIC | Age: 85
End: 2025-01-23
Payer: MEDICARE

## 2025-01-23 NOTE — TELEPHONE ENCOUNTER
Returned patient's call regarding build up of wax-like substance in both of her ears. Suggested applying OTC hydrogen peroxide drops specifically for ear care. Left office phone number should patient have any further questions or concerns.

## 2025-01-23 NOTE — TELEPHONE ENCOUNTER
Returned call to pharmacy-original Rx for doxycycline monohydrate 100 mg BID not covered by insurance, changed to doxycycline hyclate 100 mg capsules, BID, quantity 180. Verbal order provided to pharmacist.

## 2025-01-24 DIAGNOSIS — R21 RASH AND OTHER NONSPECIFIC SKIN ERUPTION: Primary | ICD-10-CM

## 2025-01-24 RX ORDER — DOXYCYCLINE 100 MG/1
100 CAPSULE ORAL 2 TIMES DAILY
Qty: 180 CAPSULE | Refills: 0 | Status: SHIPPED | OUTPATIENT
Start: 2025-01-24 | End: 2025-04-24

## 2025-02-04 ENCOUNTER — TREATMENT (OUTPATIENT)
Dept: PHYSICAL THERAPY | Facility: CLINIC | Age: 85
End: 2025-02-04
Payer: MEDICARE

## 2025-02-04 DIAGNOSIS — M79.672 PAIN IN BOTH FEET: ICD-10-CM

## 2025-02-04 DIAGNOSIS — M79.671 PAIN IN BOTH FEET: ICD-10-CM

## 2025-02-04 DIAGNOSIS — G62.9 NEUROPATHY: ICD-10-CM

## 2025-02-04 DIAGNOSIS — R26.89 IMPAIRED GAIT AND MOBILITY: Primary | ICD-10-CM

## 2025-02-04 DIAGNOSIS — R53.81 PHYSICAL DECONDITIONING: ICD-10-CM

## 2025-02-04 DIAGNOSIS — R26.89 BALANCE DISORDER: ICD-10-CM

## 2025-02-04 PROCEDURE — 97116 GAIT TRAINING THERAPY: CPT | Mod: GP | Performed by: PHYSICAL THERAPIST

## 2025-02-04 PROCEDURE — 97112 NEUROMUSCULAR REEDUCATION: CPT | Mod: GP | Performed by: PHYSICAL THERAPIST

## 2025-02-04 NOTE — PROGRESS NOTES
Physical Therapy    Physical Therapy Treatment    Patient Name: Kerry Arevalo  MRN: [id number]  Today's Date: 2/5/2025        PT Therapeutic Procedures Time Entry  Neuromuscular Re-Education Time Entry: 10  Gait Training Time Entry: 30       Visit number: 6 total (1/12 from re-auth)  Insurance: Payor: [employer]IC[employer] / Plan: [employer] MEDICARE / Product Type: *No Product type* /   Plan of Care Dates: 11/26/2024 - 2/24/2025 ([facility] 1/23/2025 - 4/17/2025)  [facility]  Authorization end date - 4/17/2025  Primary diagnosis: Balance disorder, Impaired gait and mobility.    Assessment:   Patient presents for follow-up PT session this date.  Patient reports following HEP daily and has had no falls.  She feels she is working at an intensity that is helpful to her.  Patient tolerated all therapeutic activities well with supervision and instruction. Patient has improved her walking technique and speed (Tinetti Gait - 9/12 and TUG time of 19.9 sec with rollator). and demonstrates improved confidence during functional mobility.  Patient is safest and most confident ambulating with B UE support.  We will practice walking with <2 UE supports in the clinic only.  Patient will continue to benefit from skilled PT to continue to improve strength, balance, gait, and overall functional mobility.    Plan: Continue per plan of care to improve functional strength and balance.         Current Problem  Problem List Items Addressed This Visit             ICD-10-CM    Neuropathy G62.9    Pain in both feet M79.671, M79.672    Physical deconditioning R53.81    Balance disorder R26.89    Impaired gait and mobility - Primary R26.89         Subjective    Patient states she is doing well, following HEP each day, sometimes 2x a day.  No falls    Precautions:   Precautions  STEADI Fall Risk Score (The score of 4 or more indicates an increased risk of falling): 9 on Evaluation.  Currently safer  using rollator or walker during ambulation.    Pain   B thigh pain/she thinks its muscles being worked, not further injury - not rated.  Tolerated session without increase of pain levels.    Objective   Outcome Measures:      2025   On eval (2024)  ABC -     1230 (77%)   1120 (70%)  Tinetti Gait   9 with rollator  8/12 with rollator  Tinetti Balance    without UE support   TUG -     15.9 sec   25.9 sec with rollator  TUG     16.2 with SPC   22.5 sec with SPC    Treatments:  NEUROMUSCULAR REEDUCATION x 10 minutes  Provided manual facilitation for correct timing and accuracy of postural responses as needed during functional mobility.  HEP review: continue as assigned   - reports no problems with exercises and performing at least 1x a day.  Attempted standing toe raises in parallel bars - unable        GAIT TRAININ minutes  Patient entered and exited clinic ambulating with rollator and supervision 50' x 2.   Gait in parallel bars - variable UE support - 10' x 20  - Able to adequately and consistently activate B DFs during gait in parallel bars and with rollator, but not with single UE support in parallel bars or with SPC.  Gait with rollator - 50' x 3  Gait with SPC - 25' x 2    Performed previously but on today (2025)  - heel raises 2x10   - mini squats - 2x10.   - Toe raises leaning on posterior wall   - seated ankle pumps (90/90)   - seated heel prop with knees extended - ankle pumps  Supine   -  bridging, butterfly - 10x   - SLR - 10x each   - sidelying SLR - 10x eac    OP EDUCATION:   Instructed, demonstrated and practiced HEP.  Given diagrams for home.  [employer] access code # [email or web address]  Include:  -  2 handed  on ball - shoulder press,  chest press, shoulder flexion to 90.  - standing calf stretch (Runner's) at counter/rollator - 30 count 2x each - MIP - 2x10   - heel raises 2x10   - HS curls 2x10 each   - mini squats - 2x10.    Goals:  Active       PT Problem       Balance (Not  "Progressing)       Start:  12/02/24    Expected End:  02/24/25       Patient will demonstrate improved safety and independence during standing household activities as evidenced by Tinetti Balance score of at least 12/16 with or without AFOs.      Goal Note       LTG              Gait (Met)       Start:  12/02/24    Expected End:  02/24/25    Resolved:  01/21/25    Patient will demonstrate improved safety and independence during household and community ambulation with appropriate assistive device as evidenced by Tinetti Gait score of 9/12 (ceiling score with device), and TUG time of 20 sec or better.      Goal Note       LTG              LE strength/motor control (Met)       Start:  12/02/24    Expected End:  02/24/25    Resolved:  01/21/25    Patient will maximize B LE strength, balance and safety by demonstrating independence and compliance with HEP as assigned.      Goal Note       STG              AFO assessment (Met)       Start:  12/02/24    Expected End:  02/24/25    Resolved:  01/21/25    Patient will receive B AFO assessment as indicated by clinical performance and physician referral due to B ankle mm weakness and sensory loss contributing to impaired balance and control of B feet during gait.      Goal Note       STG              Patient Stated Goal 1 (Progressing)       Start:  12/02/24    Expected End:  02/24/25       \"I want to get stronger and walk better if I can.\"      Goal Note       LTG                 "

## 2025-02-13 ENCOUNTER — TREATMENT (OUTPATIENT)
Dept: PHYSICAL THERAPY | Facility: CLINIC | Age: 85
End: 2025-02-13
Payer: MEDICARE

## 2025-02-13 DIAGNOSIS — R26.89 BALANCE DISORDER: ICD-10-CM

## 2025-02-13 DIAGNOSIS — G62.9 NEUROPATHY: ICD-10-CM

## 2025-02-13 DIAGNOSIS — M79.672 PAIN IN BOTH FEET: ICD-10-CM

## 2025-02-13 DIAGNOSIS — M79.671 PAIN IN BOTH FEET: ICD-10-CM

## 2025-02-13 DIAGNOSIS — R53.81 PHYSICAL DECONDITIONING: ICD-10-CM

## 2025-02-13 DIAGNOSIS — R26.89 IMPAIRED GAIT AND MOBILITY: ICD-10-CM

## 2025-02-13 PROCEDURE — 97116 GAIT TRAINING THERAPY: CPT | Mod: GP | Performed by: PHYSICAL THERAPIST

## 2025-02-13 PROCEDURE — 97112 NEUROMUSCULAR REEDUCATION: CPT | Mod: GP | Performed by: PHYSICAL THERAPIST

## 2025-02-13 NOTE — PROGRESS NOTES
"Physical Therapy    Physical Therapy Treatment    Patient Name: Kerry Arevalo  MRN: [id number]  Today's Date: 2/13/2025  Time Calculation  Start Time: 1115  Stop Time: 1200  Time Calculation (min): 45 min     PT Therapeutic Procedures Time Entry  Neuromuscular Re-Education Time Entry: 10  Gait Training Time Entry: 35       Visit number: 7 total (2/12 from re-auth)  Insurance: Payor: [employer] MEDICARE / Plan: UNITED HEALTHCARE MEDICARE / Product Type: *No Product type* /   Plan of Care Dates: 11/26/2024 - 2/24/2025 (Reauth 1/23/2025 - 4/17/2025)  Authorized visits: 12 (12 additional on reaut)  Authorization end date - 4/17/2025  Primary diagnosis: Balance disorder, Impaired gait and mobility.    Assessment:   Patient presents for follow-up PT session this date.  Patient reports following HEP daily and has had no falls.  She feels she is working at an intensity that is helpful to her.  Patient tolerated all therapeutic activities well with supervision and instruction.  Patient is safest and most confident ambulating with B UE support.  We will practice walking with <2 UE supports in the clinic only.  Patient will continue to benefit from skilled PT to continue to improve strength, balance, gait, and overall functional mobility.    Plan: Continue per plan of care to improve functional strength and balance.         Current Problem  Problem List Items Addressed This Visit             ICD-10-CM    Neuropathy G62.9    Pain in both feet M79.671, M79.672    Physical deconditioning R53.81    Balance disorder R26.89    Impaired gait and mobility R26.89         Subjective    Patient states she is doing well, following HEP each day, sometimes 2x a day.  No falls.  \"I'm feeling good!.  I'm trying to take over the house again.  Doing more for myself\"    Precautions:   Precautions  STEADI Fall Risk Score (The score of 4 or more indicates an increased risk of falling): 9 on Evaluation.  Currently safer using rollator or " walker during ambulation.    Pain   No p.ain reported today    Objective   Outcome Measures:      2025   On eval (2024)  ABC -     1230 (77%)   1120 (70%)  Tinetti Gait    with rollator   with rollator  Tinetti Balance    without UE support   TUG -     15.9 sec   25.9 sec with rollator  TUG     16.2 with SPC   22.5 sec with SPC    Treatments:  NEUROMUSCULAR REEDUCATION x 10 minutes  Provided manual facilitation for correct timing and accuracy of postural responses as needed during functional mobility.  HEP review: continue as assigned   - reports no problems with exercises and performing at least 1x a day.  Recumbent stepper - 3 minutes UEs and LEs L 1.5, 1 minute LEs only.    GAIT TRAININ minutes  Patient entered and exited clinic ambulating with rollator and supervision 50' x 2.   Gait in parallel bars - single  UE support - 10' x 10  As above with B UE support and mirror for self monitoring   - 10' x 10 with cues for exaggerated heel strike.   - 10' x 6 with cues for exaggerated heel strike and longer steps  Gait with rollator - 300' x 1, 50' x 2  - Able to adequately and consistently activate B DFs during gait in parallel bars but not with single UE support in parallel bars or with SPC.    Performed previously but on today (2025)  - heel raises 2x10   - mini squats - 2x10.   - Toe raises leaning on posterior wall   - seated ankle pumps (90/90)   - seated heel prop with knees extended - ankle pumps  Supine   -  bridging, butterfly - 10x   - SLR - 10x each   - sidelying SLR - 10x eac    OP EDUCATION:   Instructed, demonstrated and practiced HEP.  Given diagrams for home.  [employer] access code # [email or web address]  Include:  -  2 handed  on ball - shoulder press,  chest press, shoulder flexion to 90.  - standing calf stretch (Runner's) at counter/rollator - 30 count 2x each - MIP - 2x10   - heel raises 2x10   - HS curls 2x10 each   - mini squats - 2x10.    Goals:  Active       PT  "Problem       Balance (Not Progressing)       Start:  12/02/24    Expected End:  02/24/25       Patient will demonstrate improved safety and independence during standing household activities as evidenced by Tinetti Balance score of at least 12/16 with or without AFOs.      Goal Note       LTG              Gait (Met)       Start:  12/02/24    Expected End:  02/24/25    Resolved:  01/21/25    Patient will demonstrate improved safety and independence during household and community ambulation with appropriate assistive device as evidenced by Tinetti Gait score of 9/12 (ceiling score with device), and TUG time of 20 sec or better.      Goal Note       LTG              LE strength/motor control (Met)       Start:  12/02/24    Expected End:  02/24/25    Resolved:  01/21/25    Patient will maximize B LE strength, balance and safety by demonstrating independence and compliance with HEP as assigned.      Goal Note       STG              AFO assessment (Met)       Start:  12/02/24    Expected End:  02/24/25    Resolved:  01/21/25    Patient will receive B AFO assessment as indicated by clinical performance and physician referral due to B ankle mm weakness and sensory loss contributing to impaired balance and control of B feet during gait.      Goal Note       STG              Patient Stated Goal 1 (Progressing)       Start:  12/02/24    Expected End:  02/24/25       \"I want to get stronger and walk better if I can.\"      Goal Note       LTG                 "

## 2025-02-17 ENCOUNTER — TELEPHONE (OUTPATIENT)
Dept: DERMATOLOGY | Facility: CLINIC | Age: 85
End: 2025-02-17
Payer: MEDICARE

## 2025-02-17 NOTE — TELEPHONE ENCOUNTER
Spoke with patient regarding her painful flare up on scalp. Several days ago she discontinued using prescribed Lidex solution due to dislike of product. Patient was applying to wet hair while bathing and solution was covering entire body in bath water. Advised patient to apply Lidex to scalp after already out of the bath tub and dried off. Instructed to use gloves during application and place a shower cap over treated scalp before bed.

## 2025-02-18 ENCOUNTER — TELEPHONE (OUTPATIENT)
Dept: DERMATOLOGY | Facility: CLINIC | Age: 85
End: 2025-02-18
Payer: MEDICARE

## 2025-02-18 DIAGNOSIS — L73.8 OTHER SPECIFIED FOLLICULAR DISORDERS: Primary | ICD-10-CM

## 2025-02-18 RX ORDER — CLINDAMYCIN PHOSPHATE 10 UG/ML
LOTION TOPICAL DAILY
Qty: 60 ML | Refills: 11 | Status: SHIPPED | OUTPATIENT
Start: 2025-02-18 | End: 2026-02-18

## 2025-02-18 NOTE — TELEPHONE ENCOUNTER
Ms. Arevalo has been having difficultly using Lidex solution on scalp due to oily nature. Informed patient that Dr. Galo sent in a new prescription for Clindamycin for her to try on flaring scalp.

## 2025-02-20 ENCOUNTER — APPOINTMENT (OUTPATIENT)
Dept: PHYSICAL THERAPY | Facility: CLINIC | Age: 85
End: 2025-02-20
Payer: MEDICARE

## 2025-02-27 ENCOUNTER — TREATMENT (OUTPATIENT)
Dept: PHYSICAL THERAPY | Facility: CLINIC | Age: 85
End: 2025-02-27
Payer: MEDICARE

## 2025-02-27 DIAGNOSIS — G62.9 NEUROPATHY: ICD-10-CM

## 2025-02-27 DIAGNOSIS — R53.81 PHYSICAL DECONDITIONING: ICD-10-CM

## 2025-02-27 DIAGNOSIS — M79.671 PAIN IN BOTH FEET: ICD-10-CM

## 2025-02-27 DIAGNOSIS — M79.672 PAIN IN BOTH FEET: ICD-10-CM

## 2025-02-27 DIAGNOSIS — R26.89 IMPAIRED GAIT AND MOBILITY: ICD-10-CM

## 2025-02-27 DIAGNOSIS — R26.89 BALANCE DISORDER: ICD-10-CM

## 2025-02-27 PROCEDURE — 97116 GAIT TRAINING THERAPY: CPT | Mod: GP | Performed by: PHYSICAL THERAPIST

## 2025-02-27 PROCEDURE — 97112 NEUROMUSCULAR REEDUCATION: CPT | Mod: GP | Performed by: PHYSICAL THERAPIST

## 2025-02-27 NOTE — PROGRESS NOTES
"Physical Therapy    Physical Therapy Treatment    Patient Name: Kerry Arevalo  MRN: [id number]  Today's Date: 2/27/2025  Time Calculation  Start Time: 1110  Stop Time: 1155  Time Calculation (min): 45 min     PT Therapeutic Procedures Time Entry  Neuromuscular Re-Education Time Entry: 30  Gait Training Time Entry: 15       Visit number: 8 total (3/12 from re-auth)  Insurance: Payor: [employer] MEDICARE / Plan: UNITED HEALTHCARE MEDICARE / Product Type: *No Product type* /   Plan of Care Dates: 11/26/2024 - 2/24/2025 (Reauth 1/23/2025 - 4/17/2025)  Authorized visits: 12 (12 additional on reaut)  Authorization end date - 4/17/2025  Primary diagnosis: Balance disorder, Impaired gait and mobility.    Assessment:   Patient presents for follow-up PT session this date.  Patient reports following HEP daily and has had no falls.  She feels she is working at an intensity that is helpful to her.  Patient tolerated all therapeutic activities and increasing  distance ambulating well with supervision and instruction.  Patient is safest and most confident ambulating with B UE support.  We will practice walking with <2 UE supports in the clinic only.  Patient will continue to benefit from skilled PT to continue to improve strength, balance, gait, and overall functional mobility.    Plan: Continue per plan of care to improve functional strength and balance.  Progress postural responses with decreasing UE support.       Current Problem  Problem List Items Addressed This Visit             ICD-10-CM    Neuropathy G62.9    Pain in both feet M79.671, M79.672    Physical deconditioning R53.81    Balance disorder R26.89    Impaired gait and mobility R26.89         Subjective    Patient states she is doing well, following HEP each day, sometimes 2x a day.  No falls.  \"I'm feeling good!.  I'm trying to take over the house again.  Doing more for myself\"    Precautions:   Precautions  STEADI Fall Risk Score (The score of 4 or more " "indicates an increased risk of falling): 9 on Evaluation.  Currently safer using rollator or walker during ambulation.    Pain   No pain reported today    Objective   Outcome Measures:      1/21/2025   On eval (11/26/2024)  ABC -     1230 (77%)   1120 (70%)  Tinetti Gait   9/12 with rollator  8/12 with rollator  Tinetti Balance   7/16 without UE support 7/16  TUG -     15.9 sec   25.9 sec with rollator  TUG     16.2 with SPC   22.5 sec with SPC    Treatments:  NEUROMUSCULAR REEDUCATION x 30 minutes  Provided manual facilitation for correct timing and accuracy of postural responses as needed during functional mobility.  HEP review: continue as assigned   - reports no problems with exercises and performing at least 1x a day.   - Demonstrated bridging - mild LBP during bridge with moderate lumbar arch    - cued to increase LE \"push\" and eliminate back and head \"arch\"    - 10 reps - continue at home   - standing LE exercises:    - with B UE support - static, mini squats A/P rocking in even and staggered stance R/L.    - as above with light hand hold assist    - as above with no hands, B support at pelvis.    - cued to perform standing exercises at home with light B UE support      GAIT TRAINING: 15 minutes  Patient entered and exited clinic ambulating with rollator and supervision 50' x 2.   Gait with rollator - 450' x 1, 50' x 2  - Able to adequately and consistently activate B DFs during gait with rollator, even with fatigue.    Performed previously but on today (2/27/2025)   - Toe raises leaning on posterior wall   - seated ankle pumps (90/90)   - seated heel prop with knees extended - ankle pumps  Supine   -  bridging, butterfly - 10x   - SLR - 10x each   - sidelying SLR - 10x eac    OP EDUCATION:   Instructed, demonstrated and practiced HEP.  Given diagrams for home.  [employer] access code # [email or web address]  Include:  -  2 handed  on ball - shoulder press,  chest press, shoulder flexion to 90.  - standing calf stretch " "(Runner's) at counter/rollator - 30 count 2x each - MIP - 2x10   - heel raises 2x10   - HS curls 2x10 each   - mini squats - 2x10.    Goals:  Active       PT Problem       Balance (Not Progressing)       Start:  12/02/24    Expected End:  02/24/25       Patient will demonstrate improved safety and independence during standing household activities as evidenced by Tinetti Balance score of at least 12/16 with or without AFOs.      Goal Note       LTG              Gait (Met)       Start:  12/02/24    Expected End:  02/24/25    Resolved:  01/21/25    Patient will demonstrate improved safety and independence during household and community ambulation with appropriate assistive device as evidenced by Tinetti Gait score of 9/12 (ceiling score with device), and TUG time of 20 sec or better.      Goal Note       LTG              LE strength/motor control (Met)       Start:  12/02/24    Expected End:  02/24/25    Resolved:  01/21/25    Patient will maximize B LE strength, balance and safety by demonstrating independence and compliance with HEP as assigned.      Goal Note       STG              AFO assessment (Met)       Start:  12/02/24    Expected End:  02/24/25    Resolved:  01/21/25    Patient will receive B AFO assessment as indicated by clinical performance and physician referral due to B ankle mm weakness and sensory loss contributing to impaired balance and control of B feet during gait.      Goal Note       STG              Patient Stated Goal 1 (Progressing)       Start:  12/02/24    Expected End:  02/24/25       \"I want to get stronger and walk better if I can.\"      Goal Note       LTG                 "

## 2025-03-06 ENCOUNTER — TREATMENT (OUTPATIENT)
Dept: PHYSICAL THERAPY | Facility: CLINIC | Age: 85
End: 2025-03-06
Payer: MEDICARE

## 2025-03-06 DIAGNOSIS — R26.89 IMPAIRED GAIT AND MOBILITY: ICD-10-CM

## 2025-03-06 DIAGNOSIS — R26.89 BALANCE DISORDER: ICD-10-CM

## 2025-03-06 DIAGNOSIS — R53.81 PHYSICAL DECONDITIONING: ICD-10-CM

## 2025-03-06 DIAGNOSIS — M79.672 PAIN IN BOTH FEET: ICD-10-CM

## 2025-03-06 DIAGNOSIS — G62.9 NEUROPATHY: ICD-10-CM

## 2025-03-06 DIAGNOSIS — M79.671 PAIN IN BOTH FEET: ICD-10-CM

## 2025-03-06 PROCEDURE — 97116 GAIT TRAINING THERAPY: CPT | Mod: GP | Performed by: PHYSICAL THERAPIST

## 2025-03-06 NOTE — PROGRESS NOTES
"Physical Therapy    Physical Therapy Treatment    Patient Name: Kerry Arevalo  MRN: [id number]  Today's Date: 3/6/2025  Time Calculation  Start Time: 1115  Stop Time: 1200  Time Calculation (min): 45 min     PT Therapeutic Procedures Time Entry  Gait Training Time Entry: 45       Visit number: 9 total (4/12 from re-auth)  Insurance: Payor: [employer] MEDICARE / Plan: [place] [facility] MEDICARE / Product Type: *No Product type* /   Plan of Care Dates: 11/26/2024 - 2/24/2025 ([facility] 1/23/2025 - 4/17/2025)  Authorized visits: 12 (12 additional on reaut)  Authorization end date - 4/17/2025  Primary diagnosis: Balance disorder, Impaired gait and mobility.    Assessment:   Patient presents for follow-up PT session this date.  Patient reports following HEP daily and has had no falls.  She states she has been very busy this week and doesn't feel like there has akil any improvement in her walking.  Patient tolerated all therapeutic activities and increasing distance ambulating well with supervision and instruction.  Patient is safest and most confident ambulating with B UE support.  We will practice walking with <2 UE supports in the clinic only.  Patient will continue to benefit from skilled PT to continue to improve strength, balance, gait, and overall functional mobility.    Plan: Continue per plan of care to improve functional strength and balance.  Progress postural responses with decreasing UE support.       Current Problem  Problem List Items Addressed This Visit             ICD-10-CM    Neuropathy G62.9    Pain in both feet M79.671, M79.672    Physical deconditioning R53.81    Balance disorder R26.89    Impaired gait and mobility R26.89         Subjective    Patient states she is doing well, following HEP each day, sometimes 2x a day.  No falls.  \"I'm feeling good!.  I'm trying to take over the house again.  Doing more for myself\"    Precautions:   Precautions  STEADI Fall Risk Score (The score of 4 or more " indicates an increased risk of falling): 9 on Evaluation.  Currently safer using rollator or walker during ambulation.    Pain   No pain reported today    Objective   Outcome Measures:      2025   On eval (2024)  ABC -     1230 (77%)   1120 (70%)  Tinetti Gait   9/ with rollator  8/12 with rollator  Tinetti Balance    without UE support   TUG -     15.9 sec   25.9 sec with rollator  TUG     16.2 with SPC   22.5 sec with SPC    Treatments:  GAIT TRAININ minutes  Provided manual facilitation for correct timing and accuracy of postural responses as needed during functional mobility.  Patient entered and exited clinic ambulating with rollator and supervision 50' x 2.   HEP review: continue as assigned   - reports no problems with exercises and performing at least 1x a day.  Progress sit to stands with hands on knees to 2 x 10 daily.  Gait with rollator - 50' x 8  - Able to adequately and consistently activate B DFs during gait with rollator, even with fatigue.   - cued to look for soles of shoes while walking towards a mirror.  Gait in parallel bars:  single UE support - 10' x 10  No UE support - CGA - 10' x 6    Performed previously but on today (2025)   - Toe raises leaning on posterior wall   - seated ankle pumps (90/90)   - seated heel prop with knees extended - ankle pumps  Supine   -  bridging, butterfly - 10x   - SLR - 10x each   - sidelying SLR - 10x eac    OP EDUCATION:   Instructed, demonstrated and practiced HEP.  Given diagrams for home.  [employer] access code # [email or web address]  Include:  -  2 handed  on ball - shoulder press,  chest press, shoulder flexion to 90.  - standing calf stretch (Runner's) at counter/rollator - 30 count 2x each - MIP - 2x10   - heel raises 2x10   - HS curls 2x10 each   - mini squats - 2x10.    Goals:  Active       PT Problem       Balance (Not Progressing)       Start:  24    Expected End:  25       Patient will demonstrate improved safety  "and independence during standing household activities as evidenced by Tinetti Balance score of at least 12/16 with or without AFOs.      Goal Note       LTG              Gait (Met)       Start:  12/02/24    Expected End:  02/24/25    Resolved:  01/21/25    Patient will demonstrate improved safety and independence during household and community ambulation with appropriate assistive device as evidenced by Tinetti Gait score of 9/12 (ceiling score with device), and TUG time of 20 sec or better.      Goal Note       LTG              LE strength/motor control (Met)       Start:  12/02/24    Expected End:  02/24/25    Resolved:  01/21/25    Patient will maximize B LE strength, balance and safety by demonstrating independence and compliance with HEP as assigned.      Goal Note       STG              AFO assessment (Met)       Start:  12/02/24    Expected End:  02/24/25    Resolved:  01/21/25    Patient will receive B AFO assessment as indicated by clinical performance and physician referral due to B ankle mm weakness and sensory loss contributing to impaired balance and control of B feet during gait.      Goal Note       STG              Patient Stated Goal 1 (Progressing)       Start:  12/02/24    Expected End:  02/24/25       \"I want to get stronger and walk better if I can.\"      Goal Note       LTG                 "

## 2025-03-13 ENCOUNTER — TREATMENT (OUTPATIENT)
Dept: PHYSICAL THERAPY | Facility: CLINIC | Age: 85
End: 2025-03-13
Payer: MEDICARE

## 2025-03-13 DIAGNOSIS — G62.9 NEUROPATHY: ICD-10-CM

## 2025-03-13 DIAGNOSIS — M79.672 PAIN IN BOTH FEET: ICD-10-CM

## 2025-03-13 DIAGNOSIS — R26.89 BALANCE DISORDER: ICD-10-CM

## 2025-03-13 DIAGNOSIS — R26.89 IMPAIRED GAIT AND MOBILITY: ICD-10-CM

## 2025-03-13 DIAGNOSIS — M79.671 PAIN IN BOTH FEET: ICD-10-CM

## 2025-03-13 DIAGNOSIS — R53.81 PHYSICAL DECONDITIONING: ICD-10-CM

## 2025-03-13 PROCEDURE — 97110 THERAPEUTIC EXERCISES: CPT | Mod: GP | Performed by: PHYSICAL THERAPIST

## 2025-03-13 NOTE — PROGRESS NOTES
"Physical Therapy    Physical Therapy Treatment    Patient Name: Kerry Arevalo  MRN: [id number]  Today's Date: 3/13/2025  Time Calculation  Start Time: 1115  Stop Time: 1200  Time Calculation (min): 45 min     PT Therapeutic Procedures Time Entry  Therapeutic Exercise Time Entry: 45       Visit number: 10 total (5/12 from [facility])  Insurance: Payor: [facility] MEDICARE / Plan: UNITED HEALTHCARE MEDICARE / Product Type: *No Product type* /   Plan of Care Dates: 11/26/2024 - 2/24/2025 ([facility] 1/23/2025 - 4/17/2025)  Authorized visits: 12 ([facility])  Authorization end date - 4/17/2025  Primary diagnosis: Balance disorder, Impaired gait and mobility.    Assessment:   Patient presents for follow-up PT session this date.  Patient reports following HEP daily and has had no falls.  Patient reports almost falling at home while walking without her cane or rollator and she was \"walking too fast\" and lost her balance - catching herself on the china cabinet before falling.  She notes she kinked her back, and has been sore/tight since.  Patient tolerated all therapeutic activities and increasing distance ambulating well with supervision and instruction.  Patient is safest and most confident ambulating with B UE support.  We will practice walking with <2 UE supports in the clinic only.  Patient will continue to benefit from skilled PT to continue to improve strength, balance, gait, and overall functional mobility.    Plan: Continue per plan of care to improve functional strength and balance.  Progress postural responses with decreasing UE support.       Current Problem  Problem List Items Addressed This Visit             ICD-10-CM    Neuropathy G62.9    Pain in both feet M79.671, M79.672    Physical deconditioning R53.81    Balance disorder R26.89    Impaired gait and mobility R26.89         Subjective    Patient states she is doing well, following HEP each day, sometimes 2x a day.  Cande call earlier this week, " "but feels like her balance is better because she caught herself and reacted to imbalance rather than just falling.      Precautions:   Precautions  STEADI Fall Risk Score (The score of 4 or more indicates an increased risk of falling): 9 on Evaluation.  Currently safer using rollator or walker during ambulation.    Pain   L sided lower flank pain - \"just a little bit\".  Better after PT session.    Objective   Outcome Measures:      1/21/2025   On eval (11/26/2024)  ABC -     1230 (77%)   1120 (70%)  Tinetti Gait   9/12 with rollator  8/12 with rollator  Tinetti Balance   7/16 without UE support 7/16  TUG -     15.9 sec   25.9 sec with rollator  TUG     16.2 with SPC   22.5 sec with SPC    Treatments:  THERAPEUTIC EXERCISE: 45 minutes  Provided manual facilitation for correct timing and accuracy of postural responses as needed during functional mobility.  Patient entered and exited clinic ambulating with rollator and supervision 50' x 2.   HEP review: continue as assigned   - reports no problems with exercises and performing at least 1x a day.   - reviewed standng heel toe rocking with light counter support.  Supine mat ex:   - SLTR - 8x R/L (HEP)   - SKTC - 10x R/L (HEP)  Seated ex   - lumbar/thoracic flexion - ball roll forward - 5x each   - as above with diagonal ball roll - 5x each   - Practiced with armless chair to mimic coffee table    - forward 2 arm stretch (HEP)    - forward 1 arm stretch (HEP)  Recumbent stepper - 5 minutes with UEs and LEs, 3 minutes LEs only.    Performed previously but on today (2/27/2025)   - Toe raises leaning on posterior wall   - seated ankle pumps (90/90)   - seated heel prop with knees extended - ankle pumps  Supine   -  bridging, butterfly - 10x   - SLR - 10x each   - sidelying SLR - 10x eac    OP EDUCATION:   Instructed, demonstrated and practiced HEP.  Given diagrams for home.  [employer] access code # [email or web address], [email or web address] (for relief of LBP)  Include:  -  2 handed  on ball - " "shoulder press,  chest press, shoulder flexion to 90.  - standing calf stretch (Runner's) at counter/rollator - 30 count 2x each - MIP - 2x10   - heel raises 2x10   - HS curls 2x10 each   - mini squats - 2x10.    Goals:  Active       PT Problem       Balance (Not Progressing)       Start:  12/02/24    Expected End:  02/24/25       Patient will demonstrate improved safety and independence during standing household activities as evidenced by Tinetti Balance score of at least 12/16 with or without AFOs.      Goal Note       LTG              Gait (Met)       Start:  12/02/24    Expected End:  02/24/25    Resolved:  01/21/25    Patient will demonstrate improved safety and independence during household and community ambulation with appropriate assistive device as evidenced by Tinetti Gait score of 9/12 (ceiling score with device), and TUG time of 20 sec or better.      Goal Note       LTG              LE strength/motor control (Met)       Start:  12/02/24    Expected End:  02/24/25    Resolved:  01/21/25    Patient will maximize B LE strength, balance and safety by demonstrating independence and compliance with HEP as assigned.      Goal Note       STG              AFO assessment (Met)       Start:  12/02/24    Expected End:  02/24/25    Resolved:  01/21/25    Patient will receive B AFO assessment as indicated by clinical performance and physician referral due to B ankle mm weakness and sensory loss contributing to impaired balance and control of B feet during gait.      Goal Note       STG              Patient Stated Goal 1 (Progressing)       Start:  12/02/24    Expected End:  02/24/25       \"I want to get stronger and walk better if I can.\"      Goal Note       LTG                 "

## 2025-03-20 ENCOUNTER — TREATMENT (OUTPATIENT)
Dept: PHYSICAL THERAPY | Facility: CLINIC | Age: 85
End: 2025-03-20
Payer: MEDICARE

## 2025-03-20 DIAGNOSIS — R26.89 IMPAIRED GAIT AND MOBILITY: ICD-10-CM

## 2025-03-20 DIAGNOSIS — R53.81 PHYSICAL DECONDITIONING: ICD-10-CM

## 2025-03-20 DIAGNOSIS — G62.9 NEUROPATHY: ICD-10-CM

## 2025-03-20 DIAGNOSIS — M79.672 PAIN IN BOTH FEET: ICD-10-CM

## 2025-03-20 DIAGNOSIS — R26.89 BALANCE DISORDER: ICD-10-CM

## 2025-03-20 DIAGNOSIS — M79.671 PAIN IN BOTH FEET: ICD-10-CM

## 2025-03-20 PROCEDURE — 97116 GAIT TRAINING THERAPY: CPT | Mod: GP | Performed by: PHYSICAL THERAPIST

## 2025-03-20 PROCEDURE — 97110 THERAPEUTIC EXERCISES: CPT | Mod: GP | Performed by: PHYSICAL THERAPIST

## 2025-03-20 NOTE — PROGRESS NOTES
Physical Therapy    Physical Therapy Treatment    Patient Name: Kerry Arevalo  MRN: [id number]  Today's Date: 3/20/2025  Time Calculation  Start Time: 1120  Stop Time: 1200  Time Calculation (min): 40 min     PT Therapeutic Procedures Time Entry  Therapeutic Exercise Time Entry: 20  Gait Training Time Entry: 25       Visit number: 11 total (6/12 from re-auth)  Insurance: Payor: [facility] MEDICARE / Plan: [place] [facility] MEDICARE / Product Type: *No Product type* /   Plan of Care Dates: 11/26/2024 - 2/24/2025 ([facility] 1/23/2025 - 4/17/2025)  Authorized visits: 12 (12 additional on reaut)  Authorization end date - 4/17/2025  Primary diagnosis: Balance disorder, Impaired gait and mobility.    Assessment:   Patient presents for follow-up PT session this date.  Patient reports following HEP daily and has had no falls.  Patient reports her back is feeling better and she only had to do the new exercises 2x for improved pain levels.  Patient tolerated all therapeutic activities and increasing distance ambulating well with supervision and instruction.  Patient is safest and most confident ambulating with B UE support.  We will practice walking with <2 UE supports in the clinic only.  Patient will continue to benefit from skilled PT to continue to improve strength, balance, gait, and overall functional mobility.    Plan: Continue per plan of care to improve functional strength and balance.  Progress postural responses with decreasing UE support.       Current Problem  Problem List Items Addressed This Visit             ICD-10-CM    Neuropathy G62.9    Pain in both feet M79.671, M79.672    Physical deconditioning R53.81    Balance disorder R26.89    Impaired gait and mobility R26.89         Subjective    Patient states she is doing well, following HEP each day, sometimes 2x a day.  Cande call earlier this week, but feels like her balance is better because she caught herself and reacted to imbalance rather than just  "falling.      Precautions:     on Evaluation.  Currently safer using rollator or walker during ambulation.    Pain   L sided lower flank pain - \"just a little bit\".  Better after PT session.    Objective   Outcome Measures:      2025   On eval (2024)  ABC -     1230 (77%)   1120 (70%)  Tinetti Gait   9 with rollator  8/12 with rollator  Tinetti Balance    without UE support   TUG -     15.9 sec   25.9 sec with rollator  TUG     16.2 with SPC   22.5 sec with SPC    Treatments:  THERAPEUTIC EXERCISE: 20 minutes  Provided manual facilitation for correct timing and accuracy of postural responses as needed during functional mobility.  HEP review: continue as assigned   - reports no problems with exercises and performing at least 1x a day.   - reviewed standng heel toe rocking with light counter support.   - reviewed seated ankle pumps   - reviewed PF stretches in standing.  Added in standing heel raises for strengthening and B pushoff during gait with support.    GAIT TRAININ Minutes  Patient entered and exited clinic ambulating with rollator and supervision 50' x 2.   Gait in parallel bars with single UE support   - 10' x 12  Gait in parallel bars without UE support   - 10' x4 with CGA, 10' x 6 with close supervision   - decreased heel strike B.   - decreased core activation/ pelvis stability    Performed previously but on today (3/20/2025)   - Toe raises leaning on posterior wall   - seated ankle pumps (90/90)   - seated heel prop with knees extended - ankle pumps  Recumbent stepper - 5 minutes with UEs and LEs, 3 minutes LEs only.  Supine   -  bridging, butterfly - 10x   - SLR - 10x each   - sidelying SLR - 10x each    OP EDUCATION:   Instructed, demonstrated and practiced HEP.  Given diagrams for home.  Mezzobit access code # [email or web address], [email or web address] (for relief of LBP)  Include:  -  2 handed  on ball - shoulder press,  chest press, shoulder flexion to 90.  - standing calf stretch (Runner's) " "at counter/rollator - 30 count 2x each - MIP - 2x10   - heel raises 2x10   - HS curls 2x10 each   - mini squats - 2x10.  For back pain only (3/13/2024)  Supine mat ex:   - SLTR - 8x R/L (HEP)   - SKTC - 10x R/L (HEP)  Seated ex   - lumbar/thoracic flexion - ball roll forward - 5x each   - as above with diagonal ball roll - 5x each   - Practiced with armless chair to mimic coffee table    - forward 2 arm stretch (HEP)    - forward 1 arm stretch (HEP)    Goals:  Active       PT Problem       Balance (Not Progressing)       Start:  12/02/24    Expected End:  02/24/25       Patient will demonstrate improved safety and independence during standing household activities as evidenced by Tinetti Balance score of at least 12/16 with or without AFOs.      Goal Note       LTG              Gait (Met)       Start:  12/02/24    Expected End:  02/24/25    Resolved:  01/21/25    Patient will demonstrate improved safety and independence during household and community ambulation with appropriate assistive device as evidenced by Tinetti Gait score of 9/12 (ceiling score with device), and TUG time of 20 sec or better.      Goal Note       LTG              LE strength/motor control (Met)       Start:  12/02/24    Expected End:  02/24/25    Resolved:  01/21/25    Patient will maximize B LE strength, balance and safety by demonstrating independence and compliance with HEP as assigned.      Goal Note       STG              AFO assessment (Met)       Start:  12/02/24    Expected End:  02/24/25    Resolved:  01/21/25    Patient will receive B AFO assessment as indicated by clinical performance and physician referral due to B ankle mm weakness and sensory loss contributing to impaired balance and control of B feet during gait.      Goal Note       STG              Patient Stated Goal 1 (Progressing)       Start:  12/02/24    Expected End:  02/24/25       \"I want to get stronger and walk better if I can.\"      Goal Note       LTG               "

## 2025-03-26 ENCOUNTER — APPOINTMENT (OUTPATIENT)
Dept: DERMATOLOGY | Facility: CLINIC | Age: 85
End: 2025-03-26
Payer: MEDICARE

## 2025-03-26 DIAGNOSIS — L73.8 OTHER SPECIFIED FOLLICULAR DISORDERS: ICD-10-CM

## 2025-03-26 DIAGNOSIS — D72.10 DRUG REACTION WITH EOSINOPHILIA AND SYSTEMIC SYMPTOMS: Primary | ICD-10-CM

## 2025-03-26 DIAGNOSIS — T50.905A DRUG REACTION WITH EOSINOPHILIA AND SYSTEMIC SYMPTOMS: Primary | ICD-10-CM

## 2025-03-26 PROCEDURE — 1159F MED LIST DOCD IN RCRD: CPT | Performed by: STUDENT IN AN ORGANIZED HEALTH CARE EDUCATION/TRAINING PROGRAM

## 2025-03-26 PROCEDURE — 99214 OFFICE O/P EST MOD 30 MIN: CPT | Performed by: STUDENT IN AN ORGANIZED HEALTH CARE EDUCATION/TRAINING PROGRAM

## 2025-03-26 PROCEDURE — G2211 COMPLEX E/M VISIT ADD ON: HCPCS | Performed by: STUDENT IN AN ORGANIZED HEALTH CARE EDUCATION/TRAINING PROGRAM

## 2025-03-26 RX ORDER — FLUOCINONIDE TOPICAL SOLUTION USP, 0.05% 0.5 MG/ML
SOLUTION TOPICAL 2 TIMES DAILY
Qty: 60 ML | Refills: 11 | Status: SHIPPED | OUTPATIENT
Start: 2025-03-26 | End: 2026-03-26

## 2025-03-26 RX ORDER — TRIAMCINOLONE ACETONIDE 1 MG/G
CREAM TOPICAL 2 TIMES DAILY
Qty: 454 G | Refills: 11 | Status: SHIPPED | OUTPATIENT
Start: 2025-03-26

## 2025-03-26 RX ORDER — CLINDAMYCIN PHOSPHATE 10 UG/ML
LOTION TOPICAL DAILY
Qty: 120 ML | Refills: 11 | Status: SHIPPED | OUTPATIENT
Start: 2025-03-26 | End: 2026-03-26

## 2025-03-26 ASSESSMENT — DERMATOLOGY PATIENT ASSESSMENT
DO YOU USE A TANNING BED: NO
ARE YOU AN ORGAN TRANSPLANT RECIPIENT: NO
HAVE YOU HAD OR DO YOU HAVE A STAPH INFECTION: NO
HAVE YOU HAD OR DO YOU HAVE VASCULAR DISEASE: NO
ARE YOU ON BIRTH CONTROL: NO
DO YOU HAVE ANY NEW OR CHANGING LESIONS: NO
DO YOU USE SUNSCREEN: OCCASIONALLY
DO YOU HAVE IRREGULAR MENSTRUAL CYCLES: NO
ARE YOU TRYING TO GET PREGNANT: NO

## 2025-03-26 ASSESSMENT — DERMATOLOGY QUALITY OF LIFE (QOL) ASSESSMENT
DATE THE QUALITY-OF-LIFE ASSESSMENT WAS COMPLETED: 67290
RATE HOW BOTHERED YOU ARE BY SYMPTOMS OF YOUR SKIN PROBLEM (EG, ITCHING, STINGING BURNING, HURTING OR SKIN IRRITATION): 6 - ALWAYS BOTHERED
RATE HOW BOTHERED YOU ARE BY EFFECTS OF YOUR SKIN PROBLEMS ON YOUR ACTIVITIES (EG, GOING OUT, ACCOMPLISHING WHAT YOU WANT, WORK ACTIVITIES OR YOUR RELATIONSHIPS WITH OTHERS): 6 - ALWAYS BOTHERED
ARE THERE EXCLUSIONS OR EXCEPTIONS FOR THE QUALITY OF LIFE ASSESSMENT: NO
RATE HOW EMOTIONALLY BOTHERED YOU ARE BY YOUR SKIN PROBLEM (FOR EXAMPLE, WORRY, EMBARRASSMENT, FRUSTRATION): 6 - ALWAYS BOTHERED

## 2025-03-26 ASSESSMENT — ITCH NUMERIC RATING SCALE: HOW SEVERE IS YOUR ITCHING?: 6

## 2025-03-26 ASSESSMENT — PATIENT GLOBAL ASSESSMENT (PGA): PATIENT GLOBAL ASSESSMENT: PATIENT GLOBAL ASSESSMENT:  2 - MILD

## 2025-03-26 NOTE — PROGRESS NOTES
Subjective     Kerry Arevalo is a 85 y.o. 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 States poor response with treatment; completed prednisone).     Review of Systems:  No other skin or systemic complaints other than what is documented elsewhere in the note.    The following portions of the chart were reviewed this encounter and updated as appropriate:          Skin Cancer History  No skin cancer on file.      Specialty Problems    None       Objective   Well appearing patient in no apparent distress; mood and affect are within normal limits.    A focused skin examination was performed. All findings within normal limits unless otherwise noted below.    Assessment/Plan   1. 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   Discharged on steroids and triamcinolone, was doing well and stopped prednisone in July, now with severe erythrodermic flare  Labs normalized last month  Discussed that DRESS syndrome often requires slow long taper of topical steroids    Written instruction provided to patient and daughter    Finished last dose of prednisone yesterday  Had significant flare after stopping prednisone abruptly last visit  S/p very slow taper  Triamcinolone 0.1% cream for rash, 454 g jar sent today  To call if rash recurs  Fluocinonide 0.05% solution for scalp bid prn    Follow in 2 months for continued management of this complex chronic condition          triamcinolone (Kenalog) 0.1 % cream  Apply topically 2 times a day. To rash on body until clear then as needed    Related Procedures  Follow Up In Dermatology - Established Patient    Related Medications  fluocinonide (Lidex) 0.05 % external  solution  Apply topically 2 times a day. To scalp    2. Other specified follicular disorders  Scalp  Follicular pustules    Began when she used fluocinolone oil to scalp  Clindamycin lotion bid to affected area    Related Medications  clindamycin (Cleocin T) 1 % lotion  Apply topically once daily. To bumps until clear then as needed

## 2025-03-27 ENCOUNTER — TREATMENT (OUTPATIENT)
Dept: PHYSICAL THERAPY | Facility: CLINIC | Age: 85
End: 2025-03-27
Payer: MEDICARE

## 2025-03-27 DIAGNOSIS — R26.89 IMPAIRED GAIT AND MOBILITY: ICD-10-CM

## 2025-03-27 DIAGNOSIS — R26.89 BALANCE DISORDER: ICD-10-CM

## 2025-03-27 DIAGNOSIS — M79.672 PAIN IN BOTH FEET: ICD-10-CM

## 2025-03-27 DIAGNOSIS — R53.81 PHYSICAL DECONDITIONING: ICD-10-CM

## 2025-03-27 DIAGNOSIS — M79.671 PAIN IN BOTH FEET: ICD-10-CM

## 2025-03-27 DIAGNOSIS — G62.9 NEUROPATHY: ICD-10-CM

## 2025-03-27 PROCEDURE — 97116 GAIT TRAINING THERAPY: CPT | Mod: GP | Performed by: PHYSICAL THERAPIST

## 2025-03-27 PROCEDURE — 97110 THERAPEUTIC EXERCISES: CPT | Mod: GP | Performed by: PHYSICAL THERAPIST

## 2025-03-27 NOTE — PROGRESS NOTES
Physical Therapy    Physical Therapy Treatment    Patient Name: Kerry Arevalo  MRN: [id number]  Today's Date: 3/27/2025  Time Calculation  Start Time: 1115  Stop Time: 1200  Time Calculation (min): 45 min     PT Therapeutic Procedures Time Entry  Therapeutic Exercise Time Entry: 10  Gait Training Time Entry: 35       Visit number: 12 total (7/12 from re-auth)  Insurance: Payor: [facility] MEDICARE / Plan: [place] [facility] MEDICARE / Product Type: *No Product type* /   Plan of Care Dates: 11/26/2024 - 2/24/2025 ([facility] 1/23/2025 - 4/17/2025)  Authorized visits: 12 (12 additional on reaut)  Authorization end date - 4/17/2025  Primary diagnosis: Balance disorder, Impaired gait and mobility.    Assessment:   Patient presents for follow-up PT session this date.  Patient reports following HEP daily and has had no falls.  Patient reports her back is feeling better and she only had to do the new exercises 2x for improved pain levels.  Patient tolerated all therapeutic activities and increasing distance ambulating well with supervision and instruction.  Patient is safest and most confident ambulating with B UE support.  We will practice walking with <2 UE supports in the clinic only.  Patient will continue to benefit from skilled PT to continue to improve strength, balance, gait, and overall functional mobility.    Plan: Continue per plan of care to improve functional strength and balance.  Progress postural responses with decreasing UE support.       Current Problem  Problem List Items Addressed This Visit             ICD-10-CM    Neuropathy G62.9    Pain in both feet M79.671, M79.672    Physical deconditioning R53.81    Balance disorder R26.89    Impaired gait and mobility R26.89         Subjective    Patient states she is doing well, following HEP each day, sometimes 2x a day.  Cande call earlier this week, but feels like her balance is better because she caught herself and reacted to imbalance rather than just  "falling.      Precautions:     on Evaluation.  Currently safer using rollator or walker during ambulation.    Pain   L sided lower flank pain - \"just a little bit\".  Better after PT session.    Objective   Outcome Measures:      2025   On eval (2024)  ABC -     1230 (77%)   1120 (70%)  Tinetti Gait   9/12 with rollator  8/12 with rollator  Tinetti Balance    without UE support   TUG -     15.9 sec   25.9 sec with rollator  TUG     16.2 with SPC   22.5 sec with SPC    Treatments:  THERAPEUTIC EXERCISE: 10 minutes  Provided manual facilitation for correct timing and accuracy of postural responses as needed during functional mobility.  HEP review: continue as assigned   - reports no problems with exercises and performing at least 1x a day.   - reviewed standng heel toe rocking with light counter support.   - reviewed seated ankle pumps   - reviewed PF stretches in standing.  Added in standing heel raises for strengthening and B pushoff during gait with support.  Tinetti Balance assessment -  without UE support   -improved use of ankle and hip strategies to correct sway without UE support.    GAIT TRAININ Minutes  Patient entered and exited clinic ambulating with rollator and supervision 50' x 2.   Gait overground:   - with rollator - modified independent, good toe clearance B, L>R   - 80' x 2   - with SPC   - 80' x 1 with supervision    - cues for increased R DF in late swing phase with good response.  Stair training:   - up and down 18 6\" steps with 2 rails and supervision   - reciprocating pattern (inconsistent).    Performed previously but on today (3/20/2025)   - Toe raises leaning on posterior wall   - seated ankle pumps (90/90)   - seated heel prop with knees extended - ankle pumps  Recumbent stepper - 5 minutes with UEs and LEs, 3 minutes LEs only.  Supine   -  bridging, butterfly - 10x   - SLR - 10x each   - sidelying SLR - 10x each    OP EDUCATION:   Instructed, demonstrated and " practiced HEP.  Given diagrams for home.  [employer]ella access code # [email or web address], [email or web address] (for relief of LBP)  Include:  -  2 handed  on ball - shoulder press,  chest press, shoulder flexion to 90.  - standing calf stretch (Runner's) at counter/rollator - 30 count 2x each - MIP - 2x10   - heel raises 2x10   - HS curls 2x10 each   - mini squats - 2x10.  For back pain only (3/13/2024)  Supine mat ex:   - SLTR - 8x R/L (HEP)   - SKTC - 10x R/L (HEP)  Seated ex   - lumbar/thoracic flexion - ball roll forward - 5x each   - as above with diagonal ball roll - 5x each   - Practiced with armless chair to mimic coffee table    - forward 2 arm stretch (HEP)    - forward 1 arm stretch (HEP)    Goals:  Active       PT Problem       Balance (Not Progressing)       Start:  12/02/24    Expected End:  02/24/25       Patient will demonstrate improved safety and independence during standing household activities as evidenced by Tinetti Balance score of at least 12/16 with or without AFOs.      Goal Note       LTG              Gait (Met)       Start:  12/02/24    Expected End:  02/24/25    Resolved:  01/21/25    Patient will demonstrate improved safety and independence during household and community ambulation with appropriate assistive device as evidenced by Tinetti Gait score of 9/12 (ceiling score with device), and TUG time of 20 sec or better.      Goal Note       LTG              LE strength/motor control (Met)       Start:  12/02/24    Expected End:  02/24/25    Resolved:  01/21/25    Patient will maximize B LE strength, balance and safety by demonstrating independence and compliance with HEP as assigned.      Goal Note       STG              AFO assessment (Met)       Start:  12/02/24    Expected End:  02/24/25    Resolved:  01/21/25    Patient will receive B AFO assessment as indicated by clinical performance and physician referral due to B ankle mm weakness and sensory loss contributing to impaired balance and control of B feet  "during gait.      Goal Note       STG              Patient Stated Goal 1 (Progressing)       Start:  12/02/24    Expected End:  02/24/25       \"I want to get stronger and walk better if I can.\"      Goal Note       LTG                 "

## 2025-04-03 ENCOUNTER — TREATMENT (OUTPATIENT)
Dept: PHYSICAL THERAPY | Facility: CLINIC | Age: 85
End: 2025-04-03
Payer: MEDICARE

## 2025-04-03 DIAGNOSIS — M79.671 PAIN IN BOTH FEET: ICD-10-CM

## 2025-04-03 DIAGNOSIS — M79.672 PAIN IN BOTH FEET: ICD-10-CM

## 2025-04-03 DIAGNOSIS — R53.81 PHYSICAL DECONDITIONING: ICD-10-CM

## 2025-04-03 DIAGNOSIS — R26.89 BALANCE DISORDER: ICD-10-CM

## 2025-04-03 DIAGNOSIS — G62.9 NEUROPATHY: ICD-10-CM

## 2025-04-03 DIAGNOSIS — R26.89 IMPAIRED GAIT AND MOBILITY: ICD-10-CM

## 2025-04-03 PROCEDURE — 97116 GAIT TRAINING THERAPY: CPT | Mod: GP | Performed by: PHYSICAL THERAPIST

## 2025-04-03 PROCEDURE — 97110 THERAPEUTIC EXERCISES: CPT | Mod: GP | Performed by: PHYSICAL THERAPIST

## 2025-04-03 NOTE — PROGRESS NOTES
Physical Therapy    Physical Therapy Treatment    Patient Name: Kerry Arevalo  MRN: [id number]  Today's Date: 4/3/2025  Time Calculation  Start Time: 1115  Stop Time: 1155  Time Calculation (min): 40 min     PT Therapeutic Procedures Time Entry  Therapeutic Exercise Time Entry: 15  Gait Training Time Entry: 25       Visit number: 13 total (8/12 from re-auth)  Insurance: Payor: [facility] MEDICARE / Plan: [place] [facility] MEDICARE / Product Type: *No Product type* /   Plan of Care Dates: 11/26/2024 - 2/24/2025 ([facility] 1/23/2025 - 4/17/2025)  Authorized visits: 12 (12 additional on reaut)  Authorization end date - 4/17/2025  Primary diagnosis: Balance disorder, Impaired gait and mobility.    Assessment:   Patient presents for follow-up PT session this date.  Patient reports following HEP daily and has had no falls.  Patient reports her back is feeling better.  Patient tolerated all therapeutic activities and increasing distance ambulating well with supervision and instruction.  Patient is safest and most confident ambulating with B UE support.  We will practice walking with <2 UE supports in the clinic only.  Patient will continue to benefit from skilled PT to continue to improve strength, balance, gait, and overall functional mobility.    Plan: Continue per plan of care to improve functional strength and balance.  Patient has achieved 3/5 LTGs and is very close to achieving remaining 2 goals.  She is independent and compliant with HEP.  We will try a short break from therapy to assess her ability to continue functional goal progress independently prior to current POC discharge.  We will check in in 2.5 - 3 weeks prior to formal PT discharge.       Current Problem  Problem List Items Addressed This Visit             ICD-10-CM    Neuropathy G62.9    Pain in both feet M79.671, M79.672    Physical deconditioning R53.81    Balance disorder R26.89    Impaired gait and mobility R26.89         Subjective    Patient  states she is doing well, following HEP each day, sometimes 2x a day.  Patient reports she was able to cook a meal on the stove using 3 pots and took 30 minutes with nobody home to assist her.     Precautions:   Precautions  STEADI Fall Risk Score (The score of 4 or more indicates an increased risk of falling): 9 on Evaluation.  Currently safest using rollator or walker during ambulation.    Pain   Patient denies pain today    Objective   Outcome Measures:      3/27/2025  2025   On eval (2024)  ABC -        1230 (77%)   1120 (70%)  Tinetti Gait    with rollator  with rollator  8/ with rollator  Tinetti Balance    without UE support   TUG -        15.9 sec   25.9 sec with rollator  TUG        16.2 with SPC   22.5 sec with SPC    Treatments:  THERAPEUTIC EXERCISE: 15 minutes  Provided manual facilitation for correct timing and accuracy of postural responses as needed during functional mobility.  HEP review: continue as assigned   - reports no problems with exercises and performing at least 1x a day.   - reviewed standng heel toe rocking with light counter support.   - reviewed seated ankle pumps (90/90)   - standing heel toe rocking   - standing calf stretch R/L 10 x each  ADDED in contralateral DF during runners calf stretch. - 10x each    GAIT TRAININ Minutes  Patient entered and exited clinic ambulating with rollator and supervision 50' x 2.   Gait in parallel bars:   - 10' x 16 with single (R) UE support   - 10' x 10 L UE support  Gait overground:   - with rollator - modified independent, good toe clearance B, L>R   - 200' x 1   - with SPC   - 300' x 1 with supervision    - cues for increased R DF in late swing phase with good response.    Performed previously but on today (3/20/2025)   - Toe raises leaning on posterior wall   - seated heel prop with knees extended - ankle pumps  Recumbent stepper - 5 minutes with UEs and LEs, 3 minutes LEs only.  Supine   -  bridging,  butterfly - 10x   - SLR - 10x each   - sidelying SLR - 10x each    OP EDUCATION:   Instructed, demonstrated and practiced HEP.  Given diagrams for home.  I[employer]as access code # [email or web address], [email or web address] (for relief of LBP)  Include:  -  2 handed  on ball - shoulder press,  chest press, shoulder flexion to 90.  - standing calf stretch (Runner's) at counter/rollator - 30 count 2x each - MIP - 2x10   - heel raises 2x10   - HS curls 2x10 each   - mini squats - 2x10.  For back pain only (3/13/2024)  Supine mat ex:   - SLTR - 8x R/L (HEP)   - SKTC - 10x R/L (HEP)  Seated ex   - lumbar/thoracic flexion - ball roll forward - 5x each   - as above with diagonal ball roll - 5x each   - Practiced with armless chair to mimic coffee table    - forward 2 arm stretch (HEP)    - forward 1 arm stretch (HEP)    Goals:  Active       PT Problem       Balance (Progressing)       Start:  12/02/24    Expected End:  04/17/25       Patient will demonstrate improved safety and independence during standing household activities as evidenced by Tinetti Balance score of at least 12/16 with or without AFOs.         Gait (Met)       Start:  12/02/24    Expected End:  02/24/25    Resolved:  01/21/25    Patient will demonstrate improved safety and independence during household and community ambulation with appropriate assistive device as evidenced by Tinetti Gait score of 9/12 (ceiling score with device), and TUG time of 20 sec or better.      Goal Note       LTG              LE strength/motor control (Met)       Start:  12/02/24    Expected End:  02/24/25    Resolved:  01/21/25    Patient will maximize B LE strength, balance and safety by demonstrating independence and compliance with HEP as assigned.      Goal Note       STG              AFO assessment (Met)       Start:  12/02/24    Expected End:  02/24/25    Resolved:  01/21/25    Patient will receive B AFO assessment as indicated by clinical performance and physician referral due to B ankle mm  "weakness and sensory loss contributing to impaired balance and control of B feet during gait.      Goal Note       STG              Patient Stated Goal 1 (Progressing)       Start:  12/02/24    Expected End:  04/17/25       \"I want to get stronger and walk better if I can.\"      Goal Note       LTG                   "

## 2025-04-07 ENCOUNTER — APPOINTMENT (OUTPATIENT)
Dept: PRIMARY CARE | Facility: CLINIC | Age: 85
End: 2025-04-07
Payer: MEDICARE

## 2025-04-10 ENCOUNTER — APPOINTMENT (OUTPATIENT)
Dept: PRIMARY CARE | Facility: CLINIC | Age: 85
End: 2025-04-10
Payer: MEDICARE

## 2025-04-21 ENCOUNTER — OFFICE VISIT (OUTPATIENT)
Dept: PODIATRY | Facility: CLINIC | Age: 85
End: 2025-04-21
Payer: MEDICARE

## 2025-04-21 ENCOUNTER — DOCUMENTATION (OUTPATIENT)
Dept: PHYSICAL THERAPY | Facility: CLINIC | Age: 85
End: 2025-04-21
Payer: MEDICARE

## 2025-04-21 DIAGNOSIS — M06.9 RHEUMATOID ARTHRITIS INVOLVING BOTH FEET, UNSPECIFIED WHETHER RHEUMATOID FACTOR PRESENT (MULTI): ICD-10-CM

## 2025-04-21 DIAGNOSIS — M79.671 PAIN IN BOTH FEET: Primary | ICD-10-CM

## 2025-04-21 DIAGNOSIS — M79.672 PAIN IN BOTH FEET: Primary | ICD-10-CM

## 2025-04-21 DIAGNOSIS — M21.42 PES PLANUS OF BOTH FEET: ICD-10-CM

## 2025-04-21 DIAGNOSIS — M19.072 PRIMARY OSTEOARTHRITIS OF LEFT FOOT: ICD-10-CM

## 2025-04-21 DIAGNOSIS — R29.898 WEAKNESS OF BOTH LOWER EXTREMITIES: ICD-10-CM

## 2025-04-21 DIAGNOSIS — L60.3 NAIL DYSTROPHY: ICD-10-CM

## 2025-04-21 DIAGNOSIS — M21.41 PES PLANUS OF BOTH FEET: ICD-10-CM

## 2025-04-21 PROCEDURE — 99213 OFFICE O/P EST LOW 20 MIN: CPT | Performed by: PODIATRIST

## 2025-04-21 PROCEDURE — 1160F RVW MEDS BY RX/DR IN RCRD: CPT | Performed by: PODIATRIST

## 2025-04-21 PROCEDURE — 1159F MED LIST DOCD IN RCRD: CPT | Performed by: PODIATRIST

## 2025-04-21 PROCEDURE — 1036F TOBACCO NON-USER: CPT | Performed by: PODIATRIST

## 2025-04-21 NOTE — PROGRESS NOTES
"Physical Therapy    Discharge Summary    Name: Kerry Arevalo \"Kerry Arevalo\"  MRN: [id number]  : 1940  Date: 25    Discharge Summary: PT    Discharge Information: Date of discharge 2025, Date of last visit 4/3/2025, Date of evaluation 2025, Number of attended visits 13, Referred by Dr. Ivy Brown, and Referred for deconditioning, foot pain, balance disorder, impaired gait and mobility.    Discharge Status: Home with HEP.  Independent ambulator with rollator, 0/10 B foot pain.    Rehab Discharge Reason: Achieved all and/or the most significant goals(s)  "

## 2025-04-21 NOTE — PROGRESS NOTES
Chief Complaint   Patient presents with    Follow-up     DM FOOT CARE     Chief Complaint   Patient presents with    Follow-up     DM FOOT CARE      Here with Brisa, daughter.  Follow-up bilateral extremity discomfort.  No further cramping.  She has been following with physical therapy.  Doing therapy at home and ambulating.  She has not had any recent falls.  Nails are stable.  Using rollator.  History of DRESS syndrome.  Medications and allergies reviewed.  She is following with dermatology and physical therapy.    Review of systems negative except as noted above.    Medication and allergies reviewed.    General/Constitutional: Alert. NAD.   Respiratory: Non labored breathing.   Psychiatric: Mood and affect normal/baseline.   HEENT: Sclera clear. Wearing corrective lenses.  Dermatologic: Baseline dystrophic nails.  At this time reviewed.  Be well-managed well-kept.  No acute inflammatory infectious process. Web spaces are dry. No ulcers no pre-ulcerative areas.  Vascular: Pedal pulses are intact and symmetric including the dorsalis pedis and the posterior tibial pulses. Feet are warm to touch. No swelling appreciated either extremity.  Neurological: Alert and oriented. No acute distress.  Decreased monofilament testing bilaterally.  Musculoskeletal: Strength not evaluated.  Using walker for gait.     Impression: Resolved cramping; DJD, pes planus, improved gait.  Drop foot deformity/motor weakness.    -Today's treatment and course of therapy was discussed with the patient in detail. Patient's questions were answered. Proper foot care was discussed. This dictation was done using Dragon computer software and as such may contain grammatical errors.    - Continue with your therapy ambulating and other exercises as instructed.    -Nail debridement was performed this was a greater than 6 nails. Nails were manually debrided.  They were decreased and girth in length. Appropriate care was discussed. Preventative care was  reviewed.     - Pedal findings appear stable at this time.  Follow-up 6 months.    - Labs 10/9/2024 reviewed.  Not pertinent to today's findings.    - Latest medicine and physical therapy notes reviewed.

## 2025-05-03 DIAGNOSIS — E78.00 ELEVATED SERUM CHOLESTEROL: ICD-10-CM

## 2025-05-03 DIAGNOSIS — I10 HYPERTENSION, UNSPECIFIED TYPE: ICD-10-CM

## 2025-05-05 RX ORDER — PRAVASTATIN SODIUM 40 MG/1
40 TABLET ORAL NIGHTLY
Qty: 90 TABLET | Refills: 3 | Status: SHIPPED | OUTPATIENT
Start: 2025-05-05

## 2025-05-05 RX ORDER — LOSARTAN POTASSIUM 50 MG/1
50 TABLET ORAL DAILY
Qty: 90 TABLET | Refills: 3 | Status: SHIPPED | OUTPATIENT
Start: 2025-05-05

## 2025-05-14 ENCOUNTER — OFFICE VISIT (OUTPATIENT)
Dept: DERMATOLOGY | Facility: CLINIC | Age: 85
End: 2025-05-14
Payer: MEDICARE

## 2025-05-14 DIAGNOSIS — D72.10 DRUG REACTION WITH EOSINOPHILIA AND SYSTEMIC SYMPTOMS: ICD-10-CM

## 2025-05-14 DIAGNOSIS — T50.905A DRUG REACTION WITH EOSINOPHILIA AND SYSTEMIC SYMPTOMS: ICD-10-CM

## 2025-05-14 DIAGNOSIS — D48.5 NEOPLASM OF UNCERTAIN BEHAVIOR OF SKIN: Primary | ICD-10-CM

## 2025-05-14 DIAGNOSIS — R21 RASH AND OTHER NONSPECIFIC SKIN ERUPTION: ICD-10-CM

## 2025-05-14 PROCEDURE — 1159F MED LIST DOCD IN RCRD: CPT | Performed by: STUDENT IN AN ORGANIZED HEALTH CARE EDUCATION/TRAINING PROGRAM

## 2025-05-14 PROCEDURE — 99214 OFFICE O/P EST MOD 30 MIN: CPT | Performed by: STUDENT IN AN ORGANIZED HEALTH CARE EDUCATION/TRAINING PROGRAM

## 2025-05-14 PROCEDURE — 11104 PUNCH BX SKIN SINGLE LESION: CPT | Performed by: STUDENT IN AN ORGANIZED HEALTH CARE EDUCATION/TRAINING PROGRAM

## 2025-05-14 RX ORDER — PREDNISONE 10 MG/1
10 TABLET ORAL EVERY MORNING
Qty: 30 TABLET | Refills: 3 | Status: SHIPPED | OUTPATIENT
Start: 2025-05-14 | End: 2025-05-14

## 2025-05-14 RX ORDER — PREDNISONE 10 MG/1
10 TABLET ORAL EVERY MORNING
Qty: 30 TABLET | Refills: 3 | Status: SHIPPED | OUTPATIENT
Start: 2025-05-14 | End: 2025-09-11

## 2025-05-14 NOTE — PATIENT INSTRUCTIONS
We did a biopsy of your scalp to better explain your symptoms. We will get the results of this biopsy in less than a week. We also did a bacterial swab to rule out infection.     Start prednisone 10 mg daily. Discuss your high blood pressure with your primary care doctor.     Follow the wound care instructions of the wound care sheet provided.

## 2025-05-14 NOTE — Clinical Note
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   Discharged on steroids and triamcinolone, was doing well and stopped prednisone in July. Developed erythrodermic flare after stopping systemic steroids abruptly. Improved with very slower taper.   Discussed that DRESS syndrome often requires slow long taper of topical steroids    Off prednisone for a couple months  Reports significant symptoms on scalp. No erythema, but some thickened skin and papules. Reports frequent manipulation and pulling scales off. Not scaly today, she reports she washed it this morning. Bacterial swab to r/o infection. Biopsy to aid in diagnosis. Reports using topical Lidex and clindamycin. LSC vs infectious process vs less likely dissecting cellulitis  Triamcinolone 0.1% cream for rash, 454 g jar sent today  To call if rash recurs  Fluocinonide 0.05% solution for scalp bid prn  Will restart prednisone 10 mg to see if this is part of her underlying DRESS process.  Follow in 2 months for continued management of this complex chronic condition

## 2025-05-15 NOTE — PROGRESS NOTES
"Subjective     Kerry H Mickey \"Kerry Mickey\" is a 85 y.o. female who presents for the following: Rash (DRESS follow up(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). . LV Rx triamcinolone and fluocinonide. Patient states that her symptoms on scalp are not improving and scabs are forming on center scalp. She is also experiencing irritation to her skin where her undergarments touch.).          Review of Systems:  No other skin or systemic complaints other than what is documented elsewhere in the note.    The following portions of the chart were reviewed this encounter and updated as appropriate:          Skin Cancer History  Biopsy Log Book  No skin cancers from Specimen Tracking.    Additional History      Specialty Problems    None       Objective   Well appearing patient in no apparent distress; mood and affect are within normal limits.    A focused skin examination was performed. All findings within normal limits unless otherwise noted below.    Assessment/Plan   Skin Exam  1. NEOPLASM OF UNCERTAIN BEHAVIOR OF SKIN  Mid Frontal Scalp  Thick hyperpigmented slightly scaly plaque with studded papules     Lesion biopsy  Type of biopsy: punch    Informed consent: discussed and consent obtained    Timeout: patient name, date of birth, surgical site, and procedure verified    Procedure prep:  Patient was prepped and draped  Anesthesia: the lesion was anesthetized in a standard fashion    Anesthetic:  1% lidocaine w/ epinephrine 1-100,000 local infiltration  Punch size:  4 mm  Suture size:  4-0  Suture type: Prolene (polypropylene)    Suture removal (days):  14  Hemostasis achieved with: suture    Outcome: patient tolerated procedure well    Post-procedure details: sterile dressing applied and wound care instructions given    Dressing type: bandage and petrolatum      Staff Communication: Dermatology Local Anesthesia: 1 % Lidocaine / Epinephrine - Amount: 3 ml  Specimen 1 - Dermatopathology- DERM " LAB  Differential Diagnosis: infectious etiology majocchi vs lsc vs other  Check Margins Yes/No?:    Comments:  hx DRESS on chronic steroids, thick plaque on scalp with papulopustules  Dermpath Lab: Routine Histopathology (formalin-fixed tissue)    2. RASH AND OTHER NONSPECIFIC SKIN ERUPTION  Scalp  QUEST CULTURE, AEROBIC AND ANAEROBIC W/GRAM STAIN - Scalp  3. 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   Discharged on steroids and triamcinolone, was doing well and stopped prednisone in July. Developed erythrodermic flare after stopping systemic steroids abruptly. Improved with very slower taper.   Discussed that DRESS syndrome often requires slow long taper of topical steroids    Off prednisone for a couple months  Reports significant symptoms on scalp. No erythema, but some thickened skin and papules. Reports frequent manipulation and pulling scales off. Not scaly today, she reports she washed it this morning. Bacterial swab to r/o infection. Biopsy to aid in diagnosis. Reports using topical Lidex and clindamycin. LSC vs infectious process vs less likely dissecting cellulitis  Triamcinolone 0.1% cream for rash, 454 g jar sent today  To call if rash recurs  Fluocinonide 0.05% solution for scalp bid prn  Will restart prednisone 10 mg to see if this is part of her underlying DRESS process.  Follow in 2 months for continued management of this complex chronic condition        Related Medications  fluocinonide (Lidex) 0.05 % external solution  Apply topically 2 times a day. To scalp  triamcinolone (Kenalog) 0.1 % cream  Apply topically 2 times a day. To rash on body until clear then as needed  predniSONE (Deltasone)  10 mg tablet  Take 1 tablet (10 mg) by mouth once daily in the morning.

## 2025-05-16 LAB
LABORATORY COMMENT REPORT: NORMAL
PATH REPORT.FINAL DX SPEC: NORMAL
PATH REPORT.GROSS SPEC: NORMAL
PATH REPORT.MICROSCOPIC SPEC OTHER STN: NORMAL
PATH REPORT.RELEVANT HX SPEC: NORMAL
PATH REPORT.TOTAL CANCER: NORMAL

## 2025-05-17 LAB
BACTERIA SPEC AEROBE CULT: NORMAL
BACTERIA SPEC ANAEROBE CULT: NORMAL

## 2025-05-20 ENCOUNTER — APPOINTMENT (OUTPATIENT)
Dept: NEUROLOGY | Facility: CLINIC | Age: 85
End: 2025-05-20
Payer: MEDICARE

## 2025-05-20 DIAGNOSIS — R21 RASH AND OTHER NONSPECIFIC SKIN ERUPTION: Primary | ICD-10-CM

## 2025-05-20 LAB
BACTERIA SPEC AEROBE CULT: NORMAL
BACTERIA SPEC ANAEROBE CULT: NORMAL

## 2025-05-20 RX ORDER — CLOBETASOL PROPIONATE 0.5 MG/G
CREAM TOPICAL 2 TIMES DAILY
Qty: 60 G | Refills: 3 | Status: SHIPPED | OUTPATIENT
Start: 2025-05-20

## 2025-06-05 ENCOUNTER — TELEPHONE (OUTPATIENT)
Dept: DERMATOLOGY | Facility: CLINIC | Age: 85
End: 2025-06-05
Payer: MEDICARE

## 2025-06-05 NOTE — TELEPHONE ENCOUNTER
Pt is calling with questions about scalp medications.  Requesting clarification on what to use as she has several medications that have seen ordered over the past few months for scalp.  Jade Burnett LPN

## 2025-06-06 ENCOUNTER — PATIENT MESSAGE (OUTPATIENT)
Dept: DERMATOLOGY | Facility: CLINIC | Age: 85
End: 2025-06-06
Payer: MEDICARE

## 2025-07-02 ENCOUNTER — APPOINTMENT (OUTPATIENT)
Dept: DERMATOLOGY | Facility: CLINIC | Age: 85
End: 2025-07-02
Payer: MEDICARE

## 2025-07-02 DIAGNOSIS — T50.905A DRUG REACTION WITH EOSINOPHILIA AND SYSTEMIC SYMPTOMS: ICD-10-CM

## 2025-07-02 DIAGNOSIS — L24.9 IRRITANT CONTACT DERMATITIS OF SCALP: Primary | ICD-10-CM

## 2025-07-02 DIAGNOSIS — D72.10 DRUG REACTION WITH EOSINOPHILIA AND SYSTEMIC SYMPTOMS: ICD-10-CM

## 2025-07-02 DIAGNOSIS — R20.8 DYSESTHESIA OF SCALP: ICD-10-CM

## 2025-07-02 PROCEDURE — G2211 COMPLEX E/M VISIT ADD ON: HCPCS | Performed by: STUDENT IN AN ORGANIZED HEALTH CARE EDUCATION/TRAINING PROGRAM

## 2025-07-02 PROCEDURE — 99214 OFFICE O/P EST MOD 30 MIN: CPT | Performed by: STUDENT IN AN ORGANIZED HEALTH CARE EDUCATION/TRAINING PROGRAM

## 2025-07-02 PROCEDURE — 1159F MED LIST DOCD IN RCRD: CPT | Performed by: STUDENT IN AN ORGANIZED HEALTH CARE EDUCATION/TRAINING PROGRAM

## 2025-07-02 RX ORDER — AMLODIPINE BESYLATE 2.5 MG/1
1 TABLET ORAL
COMMUNITY
Start: 2025-05-27

## 2025-07-02 RX ORDER — FLUOCINOLONE ACETONIDE 0.11 MG/ML
OIL TOPICAL
Qty: 236.56 ML | Refills: 11 | Status: SHIPPED | OUTPATIENT
Start: 2025-07-02

## 2025-07-02 NOTE — PATIENT INSTRUCTIONS
For your scalp:    START fluocinolone oil. Apply twice daily. Keep scalp covered with shower cap. This will allow the scalp and hair to be moisturized and greased and also treat the itch.    The bumps you are feeling are blocked off hair follicles/cysts.  You need to leave the scalp alone and keep your hands off the area to allow it to heal.     Follow up in 8 weeks

## 2025-07-02 NOTE — PROGRESS NOTES
"Subjective     Kerry H Mickey \"Kerry Mickey\" is a 85 y.o. female who presents for the following: Skin Check (Pt. Complains of lesions on scalp and head. Was on her arms as well. Pt using triamcinolone twice a day. Areas itch, hurts and burns. Hx of NUB).          Review of Systems:  No other skin or systemic complaints other than what is documented elsewhere in the note.    The following portions of the chart were reviewed this encounter and updated as appropriate:          Skin Cancer History  Biopsy Log Book  No skin cancers from Specimen Tracking.    Additional History      Specialty Problems    None       Objective   Well appearing patient in no apparent distress; mood and affect are within normal limits.    A focused skin examination was performed. All findings within normal limits unless otherwise noted below.    Assessment/Plan   Skin Exam  1. IRRITANT CONTACT DERMATITIS OF SCALP  Scalp  fluocinolone and shower cap (Derma-Smoothe/FS Scalp Oil) 0.01 % oil - Scalp  To affected area on scalp twice daily  Related Procedures  Follow Up In Dermatology - Established Patient  2. 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 To scalp  triamcinolone (Kenalog) 0.1 % cream  Apply topically 2 times a day. To rash on body until clear then as needed  predniSONE (Deltasone) 10 mg tablet  Take 1 tablet (10 mg) by mouth once daily in the morning.  3. DYSESTHESIA OF SCALP  Head - Anterior (Face)  Numerous eroded macules and scattered subcutaneous papules on scalp    Hx severe DRESS reaction, now improved and off immunosuppressive therapy  Since this reaction her scalp has been incredibly itchy and irritating, resulting in her frequently scratching and picking at her scalp  Now with numerous tiny cysts, one biopsied last visit to confirm " diagnosis  Used topical antibiotics and steroids in past with minimal improvement  START fluocinolone oil under shower cap daily   Keep nails short  Avoid picking   Vaseline to open sores to allow them to heal

## 2025-07-02 NOTE — Clinical Note
Numerous eroded macules and scattered subcutaneous papules on scalp    Hx severe DRESS reaction, now improved and off immunosuppressive therapy  Since this reaction her scalp has been incredibly itchy and irritating, resulting in her frequently scratching and picking at her scalp  Now with numerous tiny cysts, one biopsied last visit to confirm diagnosis  Used topical antibiotics and steroids in past with minimal improvement  START fluocinolone oil under shower cap daily   Keep nails short  Avoid picking   Vaseline to open sores to allow them to heal

## 2025-08-28 ENCOUNTER — APPOINTMENT (OUTPATIENT)
Dept: DERMATOLOGY | Facility: CLINIC | Age: 85
End: 2025-08-28
Payer: MEDICARE

## 2025-08-28 DIAGNOSIS — L24.9 IRRITANT CONTACT DERMATITIS OF SCALP: ICD-10-CM

## 2025-08-28 DIAGNOSIS — R20.8 DYSESTHESIA OF SCALP: Primary | ICD-10-CM

## 2025-08-28 PROCEDURE — 11900 INJECT SKIN LESIONS </W 7: CPT

## 2025-08-28 PROCEDURE — 1159F MED LIST DOCD IN RCRD: CPT | Performed by: STUDENT IN AN ORGANIZED HEALTH CARE EDUCATION/TRAINING PROGRAM

## 2025-08-28 PROCEDURE — 99213 OFFICE O/P EST LOW 20 MIN: CPT | Performed by: STUDENT IN AN ORGANIZED HEALTH CARE EDUCATION/TRAINING PROGRAM

## 2025-08-28 RX ORDER — TRIAMCINOLONE ACETONIDE 10 MG/ML
10 INJECTION, SUSPENSION INTRA-ARTICULAR; INTRALESIONAL ONCE
Status: COMPLETED | OUTPATIENT
Start: 2025-08-28 | End: 2025-08-28

## 2025-08-28 RX ORDER — FLUOCINOLONE ACETONIDE 0.11 MG/ML
OIL TOPICAL
Qty: 236.56 ML | Refills: 11 | Status: SHIPPED | OUTPATIENT
Start: 2025-08-28

## 2025-08-28 RX ADMIN — TRIAMCINOLONE ACETONIDE 10 MG: 10 INJECTION, SUSPENSION INTRA-ARTICULAR; INTRALESIONAL at 14:40

## 2025-08-28 ASSESSMENT — DERMATOLOGY QUALITY OF LIFE (QOL) ASSESSMENT
RATE HOW BOTHERED YOU ARE BY EFFECTS OF YOUR SKIN PROBLEMS ON YOUR ACTIVITIES (EG, GOING OUT, ACCOMPLISHING WHAT YOU WANT, WORK ACTIVITIES OR YOUR RELATIONSHIPS WITH OTHERS): 6 - ALWAYS BOTHERED
DATE THE QUALITY-OF-LIFE ASSESSMENT WAS COMPLETED: 67445
RATE HOW EMOTIONALLY BOTHERED YOU ARE BY YOUR SKIN PROBLEM (FOR EXAMPLE, WORRY, EMBARRASSMENT, FRUSTRATION): 6 - ALWAYS BOTHERED
ARE THERE EXCLUSIONS OR EXCEPTIONS FOR THE QUALITY OF LIFE ASSESSMENT: NO
RATE HOW BOTHERED YOU ARE BY SYMPTOMS OF YOUR SKIN PROBLEM (EG, ITCHING, STINGING BURNING, HURTING OR SKIN IRRITATION): 6 - ALWAYS BOTHERED

## 2025-08-28 ASSESSMENT — DERMATOLOGY PATIENT ASSESSMENT
DO YOU USE A TANNING BED: NO
HAVE YOU HAD OR DO YOU HAVE VASCULAR DISEASE: NO
ARE YOU AN ORGAN TRANSPLANT RECIPIENT: NO
DO YOU HAVE IRREGULAR MENSTRUAL CYCLES: NO
DO YOU USE SUNSCREEN: OCCASIONALLY
DO YOU HAVE ANY NEW OR CHANGING LESIONS: NO
ARE YOU TRYING TO GET PREGNANT: NO
HAVE YOU HAD OR DO YOU HAVE A STAPH INFECTION: NO
ARE YOU ON BIRTH CONTROL: NO

## 2025-08-28 ASSESSMENT — PATIENT GLOBAL ASSESSMENT (PGA): PATIENT GLOBAL ASSESSMENT: PATIENT GLOBAL ASSESSMENT:  2 - MILD

## 2025-08-28 ASSESSMENT — ITCH NUMERIC RATING SCALE: HOW SEVERE IS YOUR ITCHING?: 5

## 2025-10-09 ENCOUNTER — APPOINTMENT (OUTPATIENT)
Dept: DERMATOLOGY | Facility: CLINIC | Age: 85
End: 2025-10-09
Payer: MEDICARE